# Patient Record
Sex: FEMALE | Employment: UNEMPLOYED | URBAN - METROPOLITAN AREA
[De-identification: names, ages, dates, MRNs, and addresses within clinical notes are randomized per-mention and may not be internally consistent; named-entity substitution may affect disease eponyms.]

---

## 2017-01-02 ENCOUNTER — ANESTHESIA - HEALTHEAST (OUTPATIENT)
Dept: SURGERY | Facility: CLINIC | Age: 42
End: 2017-01-02

## 2017-01-04 ENCOUNTER — RECORDS - HEALTHEAST (OUTPATIENT)
Dept: ADMINISTRATIVE | Facility: OTHER | Age: 42
End: 2017-01-04

## 2017-01-05 ENCOUNTER — SURGERY - HEALTHEAST (OUTPATIENT)
Dept: SURGERY | Facility: CLINIC | Age: 42
End: 2017-01-05

## 2017-01-05 ASSESSMENT — MIFFLIN-ST. JEOR: SCORE: 1294.99

## 2017-01-06 ENCOUNTER — HOME CARE/HOSPICE - HEALTHEAST (OUTPATIENT)
Dept: HOME HEALTH SERVICES | Facility: HOME HEALTH | Age: 42
End: 2017-01-06

## 2017-01-10 ENCOUNTER — HOME CARE/HOSPICE - HEALTHEAST (OUTPATIENT)
Dept: HOME HEALTH SERVICES | Facility: HOME HEALTH | Age: 42
End: 2017-01-10

## 2017-06-19 ENCOUNTER — OFFICE VISIT - HEALTHEAST (OUTPATIENT)
Dept: FAMILY MEDICINE | Facility: CLINIC | Age: 42
End: 2017-06-19

## 2017-06-19 DIAGNOSIS — M54.9 BACKACHE: ICD-10-CM

## 2017-06-19 DIAGNOSIS — K59.00 CONSTIPATION: ICD-10-CM

## 2017-06-19 ASSESSMENT — MIFFLIN-ST. JEOR: SCORE: 1218.06

## 2017-06-20 ENCOUNTER — COMMUNICATION - HEALTHEAST (OUTPATIENT)
Dept: ADMINISTRATIVE | Facility: CLINIC | Age: 42
End: 2017-06-20

## 2017-09-14 ENCOUNTER — OFFICE VISIT - HEALTHEAST (OUTPATIENT)
Dept: FAMILY MEDICINE | Facility: CLINIC | Age: 42
End: 2017-09-14

## 2017-09-14 DIAGNOSIS — H66.90 OTITIS MEDIA: ICD-10-CM

## 2017-09-14 ASSESSMENT — MIFFLIN-ST. JEOR: SCORE: 1188.4

## 2017-11-14 ENCOUNTER — OFFICE VISIT - HEALTHEAST (OUTPATIENT)
Dept: FAMILY MEDICINE | Facility: CLINIC | Age: 42
End: 2017-11-14

## 2017-11-14 DIAGNOSIS — R51.9 HEADACHE: ICD-10-CM

## 2017-11-14 DIAGNOSIS — K13.70 ORAL LESION: ICD-10-CM

## 2017-11-14 DIAGNOSIS — H93.13 CLICKING TINNITUS OF BOTH EARS: ICD-10-CM

## 2017-11-14 ASSESSMENT — MIFFLIN-ST. JEOR: SCORE: 1118.09

## 2017-11-16 LAB
LAB AP CHARGES (HE HISTORICAL CONVERSION): NORMAL
PATH REPORT.COMMENTS IMP SPEC: NORMAL
PATH REPORT.FINAL DX SPEC: NORMAL
PATH REPORT.GROSS SPEC: NORMAL
PATH REPORT.MICROSCOPIC SPEC OTHER STN: NORMAL
PATH REPORT.RELEVANT HX SPEC: NORMAL
RESULT FLAG (HE HISTORICAL CONVERSION): NORMAL

## 2017-11-17 ENCOUNTER — COMMUNICATION - HEALTHEAST (OUTPATIENT)
Dept: FAMILY MEDICINE | Facility: CLINIC | Age: 42
End: 2017-11-17

## 2018-06-01 ENCOUNTER — OFFICE VISIT - HEALTHEAST (OUTPATIENT)
Dept: FAMILY MEDICINE | Facility: CLINIC | Age: 43
End: 2018-06-01

## 2018-06-01 DIAGNOSIS — R20.2 NUMBNESS AND TINGLING IN BOTH HANDS: ICD-10-CM

## 2018-06-01 DIAGNOSIS — R51.9 HEADACHE: ICD-10-CM

## 2018-06-01 DIAGNOSIS — R20.0 NUMBNESS AND TINGLING IN BOTH HANDS: ICD-10-CM

## 2018-06-01 LAB
ANION GAP SERPL CALCULATED.3IONS-SCNC: 10 MMOL/L (ref 5–18)
BASOPHILS # BLD AUTO: 0 THOU/UL (ref 0–0.2)
BASOPHILS NFR BLD AUTO: 1 % (ref 0–2)
BUN SERPL-MCNC: 15 MG/DL (ref 8–22)
CALCIUM SERPL-MCNC: 9.5 MG/DL (ref 8.5–10.5)
CHLORIDE BLD-SCNC: 105 MMOL/L (ref 98–107)
CO2 SERPL-SCNC: 25 MMOL/L (ref 22–31)
CREAT SERPL-MCNC: 0.58 MG/DL (ref 0.6–1.1)
EOSINOPHIL # BLD AUTO: 0.1 THOU/UL (ref 0–0.4)
EOSINOPHIL NFR BLD AUTO: 2 % (ref 0–6)
ERYTHROCYTE [DISTWIDTH] IN BLOOD BY AUTOMATED COUNT: 15.4 % (ref 11–14.5)
GFR SERPL CREATININE-BSD FRML MDRD: >60 ML/MIN/1.73M2
GLUCOSE BLD-MCNC: 78 MG/DL (ref 70–125)
HCT VFR BLD AUTO: 29.7 % (ref 35–47)
HGB BLD-MCNC: 9 G/DL (ref 12–16)
LYMPHOCYTES # BLD AUTO: 1.8 THOU/UL (ref 0.8–4.4)
LYMPHOCYTES NFR BLD AUTO: 30 % (ref 20–40)
MCH RBC QN AUTO: 21.8 PG (ref 27–34)
MCHC RBC AUTO-ENTMCNC: 30.5 G/DL (ref 32–36)
MCV RBC AUTO: 71 FL (ref 80–100)
MONOCYTES # BLD AUTO: 0.4 THOU/UL (ref 0–0.9)
MONOCYTES NFR BLD AUTO: 8 % (ref 2–10)
NEUTROPHILS # BLD AUTO: 3.4 THOU/UL (ref 2–7.7)
NEUTROPHILS NFR BLD AUTO: 60 % (ref 50–70)
PLATELET # BLD AUTO: 176 THOU/UL (ref 140–440)
PMV BLD AUTO: 9.2 FL (ref 7–10)
POTASSIUM BLD-SCNC: 4.1 MMOL/L (ref 3.5–5)
RBC # BLD AUTO: 4.15 MILL/UL (ref 3.8–5.4)
SODIUM SERPL-SCNC: 140 MMOL/L (ref 136–145)
TSH SERPL DL<=0.005 MIU/L-ACNC: 0.68 UIU/ML (ref 0.3–5)
VIT B12 SERPL-MCNC: 820 PG/ML (ref 213–816)
WBC: 5.8 THOU/UL (ref 4–11)

## 2018-06-01 ASSESSMENT — MIFFLIN-ST. JEOR: SCORE: 1072.73

## 2018-06-05 ENCOUNTER — COMMUNICATION - HEALTHEAST (OUTPATIENT)
Dept: FAMILY MEDICINE | Facility: CLINIC | Age: 43
End: 2018-06-05

## 2018-06-06 ENCOUNTER — RECORDS - HEALTHEAST (OUTPATIENT)
Dept: ADMINISTRATIVE | Facility: OTHER | Age: 43
End: 2018-06-06

## 2018-06-06 ENCOUNTER — TRANSFERRED RECORDS (OUTPATIENT)
Dept: HEALTH INFORMATION MANAGEMENT | Facility: CLINIC | Age: 43
End: 2018-06-06

## 2018-07-11 NOTE — TELEPHONE ENCOUNTER
FUTURE VISIT INFORMATION      FUTURE VISIT INFORMATION:    Date: 07/12/2018    Time:     Location:   REFERRAL INFORMATION:    Referring provider:  Leti Parsons    Referring providers clinic:      Reason for visit/diagnosis          RECORDS STATUS      Internal Records Epic, Care Everywhere and PACS.

## 2018-07-12 ENCOUNTER — PRE VISIT (OUTPATIENT)
Dept: NEUROLOGY | Facility: CLINIC | Age: 43
End: 2018-07-12

## 2018-07-27 NOTE — TELEPHONE ENCOUNTER
FUTURE VISIT INFORMATION      FUTURE VISIT INFORMATION:    Date: 08/01/2018 Rescheduled from 07/12/2018    Time: 2:00 pm     Location: [facility]  REFERRAL INFORMATION:    Referring provider: Leti Henry     Referring providers clinic:      Reason for visit/diagnosis        NOTES (FOR ALL VISITS) STATUS DETAILS   OFFICE NOTE from referring provider N/A    OFFICE NOTE from other specialist N/A    DISCHARGE SUMMARY from hospital Care Everywhere  01/05/2017 - 01/08/2017   DISCHARGE REPORT from the ER N/A    OPERATIVE REPORT N/A    MEDICATION LIST Internal    IMAGING  (FOR ALL VISITS)     EMG Care Everywhere 06/06/2018   EEG N/A    ECT N/A    MRI (HEAD, NECK, SPINE) N/A    CT (HEAD, NECK, SPINE) N/A

## 2018-08-01 ENCOUNTER — OFFICE VISIT (OUTPATIENT)
Dept: NEUROLOGY | Facility: CLINIC | Age: 43
End: 2018-08-01
Payer: COMMERCIAL

## 2018-08-01 ENCOUNTER — PRE VISIT (OUTPATIENT)
Dept: NEUROLOGY | Facility: CLINIC | Age: 43
End: 2018-08-01

## 2018-08-01 ENCOUNTER — RECORDS - HEALTHEAST (OUTPATIENT)
Dept: ADMINISTRATIVE | Facility: OTHER | Age: 43
End: 2018-08-01

## 2018-08-01 VITALS
DIASTOLIC BLOOD PRESSURE: 54 MMHG | WEIGHT: 112.4 LBS | HEART RATE: 70 BPM | BODY MASS INDEX: 22.66 KG/M2 | SYSTOLIC BLOOD PRESSURE: 92 MMHG | HEIGHT: 59 IN | OXYGEN SATURATION: 97 % | RESPIRATION RATE: 20 BRPM

## 2018-08-01 DIAGNOSIS — G56.03 BILATERAL CARPAL TUNNEL SYNDROME: Primary | ICD-10-CM

## 2018-08-01 DIAGNOSIS — H90.5 SENSORINEURAL HEARING LOSS, UNILATERAL: ICD-10-CM

## 2018-08-01 ASSESSMENT — PAIN SCALES - GENERAL: PAINLEVEL: NO PAIN (0)

## 2018-08-01 NOTE — MR AVS SNAPSHOT
After Visit Summary   8/1/2018    Nataliya Lei    MRN: [id number]           Patient Information     Date Of Birth          1975        Visit Information        Provider Department      8/1/2018 2:15 PM Everardo Suarez MD [facility]        Today's Diagnoses     Bilateral carpal tunnel syndrome    -  1    Sensorineural hearing loss, unilateral           Follow-ups after your visit        Additional Services     ORTHOPEDICS ADULT REFERRAL       Your provider has referred you to: PREFERRED PROVIDERS:    Please be aware that coverage of these services is subject to the terms and limitations of your health insurance plan.  Call member services at your health plan with any benefit or coverage questions.      Please bring the following to your appointment:    >>   Any x-rays, CTs or MRIs which have been performed.  Contact the facility where they were done to arrange for  prior to your scheduled appointment.    >>   List of current medications   >>   This referral request   >>   Any documents/labs given to you for this referral            OTOLARYNGOLOGY REFERRAL       Your provider has referred you to:     Consideration of Meniere's disease    Please be aware that coverage of these services is subject to the terms and limitations of your health insurance plan.  Call member services at your health plan with any benefit or coverage questions.      Please bring the following with you to your appointment:    (1) Any X-Rays, CTs or MRIs which have been performed.  Contact the facility where they were done to arrange for  prior to your scheduled appointment.   (2) List of current medications  (3) This referral request   (4) Any documents/labs given to you for this referral                  Follow-up notes from your care team     Return in about 6 months (around 2/1/2019).      Your next 10 appointments already scheduled     Aug 06, 2018  1:00 PM CDT   (Arrive by 12:45 PM)   New Patient  "Visit with Jaquan Pires MD   [facility] ([facility])    [address]  5th Floor  [facility] [id number] [phone]            2019  3:30 PM CST   (Arrive by 3:15 PM)   Return Visit with Everardo Suarez MD   [facility] ([facility])    [address]  3rd Floor  [place] [id number] [phone]              Who to contact     Please call your clinic at [phone] to:    Ask questions about your health    Make or cancel appointments    Discuss your medicines    Learn about your test results    Speak to your doctor            Additional Information About Your Visit        MyChart Information     [employer] is an electronic gateway that provides easy, online access to your medical records. With [employer], you can request a clinic appointment, read your test results, renew a prescription or communicate with your care team.     To sign up for [employer] visit the website at [email or web address]   You will be asked to enter the access code listed below, as well as some personal information. Please follow the directions to create your username and password.     Your access code is: [email or web address]  Expires: 2018  6:31 AM     Your access code will  in 90 days. If you need help or a new code, please contact your [facility] or call [phone] for assistance.        Care EveryWhere ID     This is your Care EveryWhere ID. This could be used by other organizations to access your [facility] medical records  [id number]        Your Vitals Were     Pulse Respirations Height Pulse Oximetry BMI (Body Mass Index)       70 20 1.499 m (4' 11\") 97% 22.7 kg/m2        Blood Pressure from Last 3 Encounters:   18 92/54    Weight from Last 3 Encounters:   18 51 kg (112 lb 6.4 oz)              We Performed the Following     ORTHOPEDICS ADULT REFERRAL     OTOLARYNGOLOGY " REFERRAL        Primary Care Provider Office Phone # Fax #    Leti Parsons [phone]       [address]  [facility] [address]        Equal Access to Services     DEMETRIO RICHARDSON : Hadii gomez ku hadgko Soomaali, waaxda luqadaha, qaybta kaalmada adeegyada, waxave marleyn alexy maguire laNicholejaylen parrish. So [facility] [phone].    ATENCIÓN: Si habla español, tiene a gilbert disposición servicios gratuitos de asistencia lingüística. Llame al [phone].    We comply with applicable federal civil rights laws and [place] laws. We do not discriminate on the basis of race, color, national origin, age, disability, sex, sexual orientation, or gender identity.            Thank you!     Thank you for choosing [facility]UROLOGY  for your care. Our goal is always to provide you with excellent care. Hearing back from our patients is one way we can continue to improve our services. Please take a few minutes to complete the written survey that you may receive in the mail after your visit with us. Thank you!             Your Updated Medication List - Protect others around you: Learn how to safely use, store and throw away your medicines at www.disposemymeds.org.      Notice  As of 8/1/2018  3:21 PM    You have not been prescribed any medications.

## 2018-08-01 NOTE — LETTER
"8/1/2018       RE: Nataliya Lei  [address] [place] [address]     Dear Colleague,    Thank you for referring your patient, Nataliya Lei, to the [facility] NE[facility]OLOGY at [facility]. Please see a copy of my visit note below.    Office note dictated.    Service Date: 08/01/2018      DEPARTMENT OF NEUROLOGY      NEUROLOGY CONSULTATION      CHIEF COMPLAINT:  Numbness, tingling and ear fullness.      HISTORY OF PRESENT ILLNESS:  Nataliya Lei is a 43-year-old woman with past medical history significant for a diagnosis of carpal tunnel syndrome who is presenting with several neurological complaints.  First, regarding her carpal tunnel syndrome.  She began to have symptoms of numbness and tingling and \"nerve zinging\" pain that started approximately 5 years ago.  She recalls she was pregnant with her child when she started to have these symptoms.  Her primary care physician did give her a brace to be worn at night at this time.  Unfortunately, she only wore these braces intermittently throughout the next several years, then in approximately 2015, her symptoms began to worsen again.  She did follow up with her primary care physician and Neurology.  These notes are currently unavailable to be viewed.  She tells me she had an EMG and was again diagnosed with carpal tunnel syndrome.  She was managed conservatively and recommended to wear her braces over the next several years, she did not end up wearing her braces.  Then, approximately 5-6 months ago, her symptoms worsened significantly.  This prompted her to be evaluated by her primary care physician.  Her primary care physician ordered an EMG which was completed on 06/06/2018.  This EMG report is unavailable to be viewed.  Per patient report, this did show bilateral carpal tunnel syndrome, with right worse than left.  She is currently here to follow up on her EMG results and to discuss the next steps involved in her care.  She does endorse " "mild weakness, primarily in her distal hand and fingers.  This is notable when she is carrying a coffee cup or driving and holding onto the steering wheel for an extended period of time.  It does seem to bother her consistently throughout the day.  She has not noticed any changes in her symptoms due to therapies. She does not note any changes from medications.  Nothing seems to make it better or worse. It is now affecting her daily life significantly.      She does endorse other symptoms primarily of hearing loss and right ear fullness that started in 10/2017.  She notes at the onset, the primary symptom was hearing loss.  She describes this hearing loss as a \"boom\" sound in her right ear.  Following this sound, she describes a fullness that makes sounds foggy and difficult to hear as if it were through a substance other than air.  This started out as an intermittent process and then continuously progressed and became more frequent and more significant in nature.  Associated with this feeling of right ear fullness is tinnitus.  She does describe episodic tinnitus that does not seem to be related to position changes or activity.  She does seem to struggle with both of these symptoms.  She denies any overt vertiginous symptoms, though she does note that she was getting up from a chair several months ago and did feel as if the room were spinning and as if she was going to pass out.  She describes more of a lightheadedness feeling during this episode.  This has not happened before and she does not currently endorse any other room-spinning sensations.      REVIEW OF SYSTEMS:  A 12-point review of systems is negative except as per HPI.      ALLERGIES:  No known drug allergies.      MEDICATIONS:  No current outpatient prescriptions.      PAST MEDICAL HISTORY:  No pertinent past medical history.      PAST SURGICAL HISTORY:  No pertinent surgical history.      SOCIAL HISTORY:  She is currently single.  She denies any " smoking.  She denies alcohol.  She denies any other drug use.      FAMILY HISTORY:  She denies any pertinent family history.      PHYSICAL EXAMINATION:   VITALS:  Blood pressure is 92/54, pulse is 70, respirations are at 20.  She is 51 kg.   GENERAL:  The patient is sitting in a chair in no acute distress.   HEENT:  Atraumatic, normocephalic.   CARDIAC:  Regular rate and rhythm.   PULMONARY:  Clear to auscultation bilaterally.   ABDOMEN:  Nontender, nondistended.   EXTREMITIES:  No edema noted bilaterally.   SKIN:  Warm and dry.   NEUROLOGIC:   MENTAL STATUS:  Patient is alert and oriented to person, place and time.  She has intact comprehension and naming.  No dysarthria.   CRANIAL NERVES:  Pupils are equal, round and reactive to light.  Extraocular motions are intact.  Visual fields are full.  Her facial sensation is intact.  She does not have any facial asymmetry.  Tongue is midline.     MOTOR:  No abnormal tone, atrophy or movements or fasciculations.  She has 5/5 strength in bilateral deltoids, biceps, triceps.  She does have very mild decreased strength in her opponens pollicis on the right and finger flexors on the right as well as first dorsal interossei on the right.  She does have full strength in wrist flexion and wrist extension on the right upper extremity.  Her distal left upper extremity reveals mild first dorsal interossei weakness but is otherwise 5/5 strength throughout.   REFLEXES:  She has 2+ and symmetric reflexes at the brachioradialis, biceps and patella bilaterally.   SENSORY:  Intact light touch, pinprick and vibration in proximal bilateral upper extremities.  She does have decreased sensation to pinprick and vibration primarily in digits 1 through 3 from the distal fingertips to the palm bilaterally.  She does have decreased sensation to a lesser extent in digits 4 and 5 from the distal fingertips to the palm bilaterally.  She has intact light touch, pinprick and vibration in bilateral  lower extremities.   COORDINATION:  Intact finger-nose-finger.   GAIT:  Normal with stride length, turn and arm swing.      ASSESSMENT AND PLAN:  Ms. Nataliya Lei is a very pleasant 43-year-old lady with a diagnosis of carpal tunnel, who is presenting to the clinic with multiple different neurologic complaints.  First, she does endorse numbness, tingling and weakness in her bilateral upper extremities.  She does state that the numbness goes proximal to her shoulder blades.  She did have an EMG, the report is not currently available to me, but per patient report revealed a bilateral carpal tunnel syndrome, with right worse than left.  I do believe that her upper extremity numbness, tingling and pain is all related to her carpal tunnel syndrome.  Due to the weakness reported and mild weakness on examination, I will refer her to Orthopedic Surgery for further evaluation of a carpal tunnel procedure.      Regarding her symptoms in her right ear.  She describes tinnitus, sensorineural hearing loss.  She does deny significant vertiginous symptoms, although she does endorse more of a lightheadedness feeling over the last several months.  My concern is that this could be related to the inner ear rather than a neurologic disease given her description of her symptoms.  I will refer her to ENT for further diagnostic testing.  If no significant findings are appreciated by ENT, we will be happy to see Ms. Lei back in our clinic to reevaluate her right ear symptoms as she may require an MRI in the future.      PLAN:     1.  Referral to Orthopedic Surgery regarding carpal tunnel syndrome.   2.  Referral to ENT regarding sensorineural hearing loss and tinnitus.   3.  Return to clinic in 6 months.      The patient was seen and discussed with the attending physician, Dr. Vini Santos.     D: 2018   T: 2018   MT: ODETTE      Name:     NATALIYA LEI   MRN:      [id number]        Account:      [id number]   :       1975           Service Date: 08/01/2018      Document: [id number]       I saw and examined this patient, and have read and edited the resident's note.  I agree with the findings, assessment, and plan.    Vini Santos  Staff Neurologist   08/12/18       Again, thank you for allowing me to participate in the care of your patient.      Sincerely,    Everardo Suarez MD

## 2018-08-03 NOTE — PROGRESS NOTES
"Service Date: 08/01/2018      DEPARTMENT OF NEUROLOGY      NEUROLOGY CONSULTATION      CHIEF COMPLAINT:  Numbness, tingling and ear fullness.      HISTORY OF PRESENT ILLNESS:  Nataliya Lei is a 43-year-old woman with past medical history significant for a diagnosis of carpal tunnel syndrome who is presenting with several neurological complaints.  First, regarding her carpal tunnel syndrome.  She began to have symptoms of numbness and tingling and \"nerve zinging\" pain that started approximately 5 years ago.  She recalls she was pregnant with her child when she started to have these symptoms.  Her primary care physician did give her a brace to be worn at night at this time.  Unfortunately, she only wore these braces intermittently throughout the next several years, then in approximately 2015, her symptoms began to worsen again.  She did follow up with her primary care physician and Neurology.  These notes are currently unavailable to be viewed.  She tells me she had an EMG and was again diagnosed with carpal tunnel syndrome.  She was managed conservatively and recommended to wear her braces over the next several years, she did not end up wearing her braces.  Then, approximately 5-6 months ago, her symptoms worsened significantly.  This prompted her to be evaluated by her primary care physician.  Her primary care physician ordered an EMG which was completed on 06/06/2018.  This EMG report is unavailable to be viewed.  Per patient report, this did show bilateral carpal tunnel syndrome, with right worse than left.  She is currently here to follow up on her EMG results and to discuss the next steps involved in her care.  She does endorse mild weakness, primarily in her distal hand and fingers.  This is notable when she is carrying a coffee cup or driving and holding onto the steering wheel for an extended period of time.  It does seem to bother her consistently throughout the day.  She has not noticed any changes in her " "symptoms due to therapies. She does not note any changes from medications.  Nothing seems to make it better or worse. It is now affecting her daily life significantly.      She does endorse other symptoms primarily of hearing loss and right ear fullness that started in 10/2017.  She notes at the onset, the primary symptom was hearing loss.  She describes this hearing loss as a \"boom\" sound in her right ear.  Following this sound, she describes a fullness that makes sounds foggy and difficult to hear as if it were through a substance other than air.  This started out as an intermittent process and then continuously progressed and became more frequent and more significant in nature.  Associated with this feeling of right ear fullness is tinnitus.  She does describe episodic tinnitus that does not seem to be related to position changes or activity.  She does seem to struggle with both of these symptoms.  She denies any overt vertiginous symptoms, though she does note that she was getting up from a chair several months ago and did feel as if the room were spinning and as if she was going to pass out.  She describes more of a lightheadedness feeling during this episode.  This has not happened before and she does not currently endorse any other room-spinning sensations.      REVIEW OF SYSTEMS:  A 12-point review of systems is negative except as per HPI.      ALLERGIES:  No known drug allergies.      MEDICATIONS:  No current outpatient prescriptions.      PAST MEDICAL HISTORY:  No pertinent past medical history.      PAST SURGICAL HISTORY:  No pertinent surgical history.      SOCIAL HISTORY:  She is currently single.  She denies any smoking.  She denies alcohol.  She denies any other drug use.      FAMILY HISTORY:  She denies any pertinent family history.      PHYSICAL EXAMINATION:   VITALS:  Blood pressure is 92/54, pulse is 70, respirations are at 20.  She is 51 kg.   GENERAL:  The patient is sitting in a chair in no " acute distress.   HEENT:  Atraumatic, normocephalic.   CARDIAC:  Regular rate and rhythm.   PULMONARY:  Clear to auscultation bilaterally.   ABDOMEN:  Nontender, nondistended.   EXTREMITIES:  No edema noted bilaterally.   SKIN:  Warm and dry.   NEUROLOGIC:   MENTAL STATUS:  Patient is alert and oriented to person, place and time.  She has intact comprehension and naming.  No dysarthria.   CRANIAL NERVES:  Pupils are equal, round and reactive to light.  Extraocular motions are intact.  Visual fields are full.  Her facial sensation is intact.  She does not have any facial asymmetry.  Tongue is midline.     MOTOR:  No abnormal tone, atrophy or movements or fasciculations.  She has 5/5 strength in bilateral deltoids, biceps, triceps.  She does have very mild decreased strength in her opponens pollicis on the right and finger flexors on the right as well as first dorsal interossei on the right.  She does have full strength in wrist flexion and wrist extension on the right upper extremity.  Her distal left upper extremity reveals mild first dorsal interossei weakness but is otherwise 5/5 strength throughout.   REFLEXES:  She has 2+ and symmetric reflexes at the brachioradialis, biceps and patella bilaterally.   SENSORY:  Intact light touch, pinprick and vibration in proximal bilateral upper extremities.  She does have decreased sensation to pinprick and vibration primarily in digits 1 through 3 from the distal fingertips to the palm bilaterally.  She does have decreased sensation to a lesser extent in digits 4 and 5 from the distal fingertips to the palm bilaterally.  She has intact light touch, pinprick and vibration in bilateral lower extremities.   COORDINATION:  Intact finger-nose-finger.   GAIT:  Normal with stride length, turn and arm swing.      ASSESSMENT AND PLAN:  Ms. Nataliya Lei is a very pleasant 43-year-old lady with a diagnosis of carpal tunnel, who is presenting to the clinic with multiple different  neurologic complaints.  First, she does endorse numbness, tingling and weakness in her bilateral upper extremities.  She does state that the numbness goes proximal to her shoulder blades.  She did have an EMG, the report is not currently available to me, but per patient report revealed a bilateral carpal tunnel syndrome, with right worse than left.  I do believe that her upper extremity numbness, tingling and pain is all related to her carpal tunnel syndrome.  Due to the weakness reported and mild weakness on examination, I will refer her to Orthopedic Surgery for further evaluation of a carpal tunnel procedure.      Regarding her symptoms in her right ear.  She describes tinnitus, sensorineural hearing loss.  She does deny significant vertiginous symptoms, although she does endorse more of a lightheadedness feeling over the last several months.  My concern is that this could be related to the inner ear rather than a neurologic disease given her description of her symptoms.  I will refer her to ENT for further diagnostic testing.  If no significant findings are appreciated by ENT, we will be happy to see Ms. Lei back in our clinic to reevaluate her right ear symptoms as she may require an MRI in the future.      PLAN:     1.  Referral to Orthopedic Surgery regarding carpal tunnel syndrome.   2.  Referral to ENT regarding sensorineural hearing loss and tinnitus.   3.  Return to clinic in 6 months.      The patient was seen and discussed with the attending physician, Dr. Vini Santos.      Everardo Suarez MD  Neurology Resident, PGY-3       D: 2018   T: 2018   MT: GB      Name:     PRATIBHA LEI   MRN:      [id number]        Account:      [id number]   :      1975           Service Date: 2018      Document: [id number]       I saw and examined this patient, and have read and edited the resident's note.  I agree with the findings, assessment, and plan.    Vini Santos  Staff  Neurologist   08/12/18

## 2018-08-06 NOTE — TELEPHONE ENCOUNTER
FUTURE VISIT INFORMATION      FUTURE VISIT INFORMATION:    Date: 8/07    Time: 1:30    Location:   REFERRAL INFORMATION:    Referring provider:  Everardo Suarez    Referring providers clinic:  [facility] NEUROLOGY    Reason for visit/diagnosis: Bilateral carpal tunnel syndrome    RECORDS REQUESTED FROM:       Clinic name Comments Records Status Imaging Status   [facility]  RECEIVED                                    RECORDS STATUS      RECORDS IN EPIC

## 2018-08-07 ENCOUNTER — OFFICE VISIT (OUTPATIENT)
Dept: ORTHOPEDICS | Facility: CLINIC | Age: 43
End: 2018-08-07
Payer: COMMERCIAL

## 2018-08-07 ENCOUNTER — RECORDS - HEALTHEAST (OUTPATIENT)
Dept: ADMINISTRATIVE | Facility: OTHER | Age: 43
End: 2018-08-07

## 2018-08-07 ENCOUNTER — PRE VISIT (OUTPATIENT)
Dept: ORTHOPEDICS | Facility: CLINIC | Age: 43
End: 2018-08-07

## 2018-08-07 VITALS — HEIGHT: 59 IN | WEIGHT: 112 LBS | BODY MASS INDEX: 22.58 KG/M2

## 2018-08-07 DIAGNOSIS — G56.03 CARPAL TUNNEL SYNDROME ON BOTH SIDES: Primary | ICD-10-CM

## 2018-08-07 NOTE — PROGRESS NOTES
" Subjective:   Nataliya Lei is a RHD 43 year old female who is here for bilateral wrist and hand pain, numbness, and tingling x 5 years. Is a stay at home mom. She has had at least 2 years of intermittent symptoms in her bilateral hands.  She has utilized wrist splints at night for about 18 months without significant relief.  She has had prior carpal tunnel corticosteroid injections which did not relieve her symptoms.  She is right hand dominant.  At this juncture, she would like to proceed with carpal tunnel release.  She does not have thyroid disease, diabetes and is not pregnant.  She had an EMG in 06/2018 and that is reviewed by myself and will be scanned into the chart.  It demonstrates mild to moderate right and left carpal tunnel syndrome.       Background:   Date of injury: none     Prior Evaluation: Prior Physician Evalutation, Injection    PAST MEDICAL, SOCIAL, SURGICAL AND FAMILY HISTORY: She  has no past medical history on file.  She  has no past surgical history on file.  Her family history is not on file.  She reports that she has never smoked. She has never used smokeless tobacco.    ALLERGIES: She is allergic to blood-group specific substance.    CURRENT MEDICATIONS: She currently has no medications in their medication list.   REVIEW OF SYSTEMS: 12 point review of systems is negative except as noted above.     Exam:   Ht 4' 11\" (1.499 m)  Wt 112 lb (50.8 kg)  BMI 22.62 kg/m2      CONSTITUTIONAL: healthy, alert and no distress  GAIT: normal  NEUROLOGIC: Non-focal  PSYCHIATRIC: affect normal/bright and mentation appears normal.    MUSCULOSKELETAL:   Bilateral wrists demonstrate no evidence of thenar atrophy.  She has full range of motion in her bilateral wrists.  She has negative Tinel's bilaterally.  She has positive Phalen's that approximately 15 seconds causing increased paresthesias in the third and fourth digit on the right hand and second and third digit on the left hand.          Assessment/Plan: "   (G56.03) Carpal tunnel syndrome on both sides  (primary encounter diagnosis)  Comment: Discussed at length. Discussed surgical approach, risks, benefits, alternatives. She would like to proceed with surgical consult.  Plan: ORTHO  REFERRAL

## 2018-08-07 NOTE — LETTER
"  8/7/2018      RE: Nataliya Lei  [address] [place] [address]        Subjective:   Nataliya Lei is a RHD 43 year old female who is here for bilateral wrist and hand pain, numbness, and tingling x 5 years. Is a stay at home mom. She has had at least 2 years of intermittent symptoms in her bilateral hands.  She has utilized wrist splints at night for about 18 months without significant relief.  She has had prior carpal tunnel corticosteroid injections which did not relieve her symptoms.  She is right hand dominant.  At this juncture, she would like to proceed with carpal tunnel release.  She does not have thyroid disease, diabetes and is not pregnant.  She had an EMG in 06/2018 and that is reviewed by myself and will be scanned into the chart.  It demonstrates mild to moderate right and left carpal tunnel syndrome.       Background:   Date of injury: none     Prior Evaluation: Prior Physician Evalutation, Injection    PAST MEDICAL, SOCIAL, SURGICAL AND FAMILY HISTORY: She  has no past medical history on file.  She  has no past surgical history on file.  Her family history is not on file.  She reports that she has never smoked. She has never used smokeless tobacco.    ALLERGIES: She is allergic to blood-group specific substance.    CURRENT MEDICATIONS: She currently has no medications in their medication list.   REVIEW OF SYSTEMS: 12 point review of systems is negative except as noted above.     Exam:   Ht 4' 11\" (1.499 m)  Wt 112 lb (50.8 kg)  BMI 22.62 kg/m2      CONSTITUTIONAL: healthy, alert and no distress  GAIT: normal  NEUROLOGIC: Non-focal  PSYCHIATRIC: affect normal/bright and mentation appears normal.    MUSCULOSKELETAL:   Bilateral wrists demonstrate no evidence of thenar atrophy.  She has full range of motion in her bilateral wrists.  She has negative Tinel's bilaterally.  She has positive Phalen's that approximately 15 seconds causing increased paresthesias in the third and fourth digit on the " right hand and second and third digit on the left hand.          Assessment/Plan:   (G56.03) Carpal tunnel syndrome on both sides  (primary encounter diagnosis)  Comment: Discussed at length. Discussed surgical approach, risks, benefits, alternatives. She would like to proceed with surgical consult.  Plan: ORTHO DEST[facility] REFERRAL             Jaquan Pires MD

## 2018-08-10 DIAGNOSIS — M79.641 BILATERAL HAND PAIN: Primary | ICD-10-CM

## 2018-08-10 DIAGNOSIS — M79.642 BILATERAL HAND PAIN: Primary | ICD-10-CM

## 2018-08-11 ENCOUNTER — PRE VISIT (OUTPATIENT)
Dept: ORTHOPEDICS | Facility: CLINIC | Age: 43
End: 2018-08-11

## 2018-08-11 NOTE — TELEPHONE ENCOUNTER
FUTURE VISIT INFORMATION      FUTURE VISIT INFORMATION:    Date: 8/13    Time: 8:30    [place] [facility] [place]  REFERRAL INFORMATION:    Referring provider:  Tere Pires    Referring providers clinic:  [facility]    Reason for visit/diagnosis  bilateral carpal tunnel    RECORDS REQUESTED FROM:       Clinic name Comments Records Status Imaging Status                                         RECORDS STATUS      All records internal

## 2018-08-13 ENCOUNTER — RADIANT APPOINTMENT (OUTPATIENT)
Dept: GENERAL RADIOLOGY | Facility: CLINIC | Age: 43
End: 2018-08-13
Attending: ORTHOPAEDIC SURGERY
Payer: COMMERCIAL

## 2018-08-13 ENCOUNTER — RECORDS - HEALTHEAST (OUTPATIENT)
Dept: ADMINISTRATIVE | Facility: OTHER | Age: 43
End: 2018-08-13

## 2018-08-13 ENCOUNTER — TELEPHONE (OUTPATIENT)
Dept: OTOLARYNGOLOGY | Facility: CLINIC | Age: 43
End: 2018-08-13

## 2018-08-13 ENCOUNTER — OFFICE VISIT (OUTPATIENT)
Dept: ORTHOPEDICS | Facility: CLINIC | Age: 43
End: 2018-08-13
Payer: COMMERCIAL

## 2018-08-13 VITALS — BODY MASS INDEX: 22.58 KG/M2 | HEIGHT: 59 IN | WEIGHT: 112 LBS

## 2018-08-13 DIAGNOSIS — M79.642 BILATERAL HAND PAIN: ICD-10-CM

## 2018-08-13 DIAGNOSIS — G56.01 CARPAL TUNNEL SYNDROME OF RIGHT WRIST: ICD-10-CM

## 2018-08-13 DIAGNOSIS — G56.21 CUBITAL TUNNEL SYNDROME ON RIGHT: Primary | ICD-10-CM

## 2018-08-13 DIAGNOSIS — M79.641 BILATERAL HAND PAIN: ICD-10-CM

## 2018-08-13 ASSESSMENT — ENCOUNTER SYMPTOMS
RECTAL PAIN: 0
TROUBLE SWALLOWING: 0
LEG PAIN: 0
JOINT SWELLING: 0
HYPERTENSION: 0
MUSCLE CRAMPS: 0
HOARSE VOICE: 0
SORE THROAT: 0
MYALGIAS: 0
SYNCOPE: 0
BLOATING: 1
NECK PAIN: 1
JAUNDICE: 0
TASTE DISTURBANCE: 0
PALPITATIONS: 0
STIFFNESS: 1
EXERCISE INTOLERANCE: 0
HYPOTENSION: 0
HEARTBURN: 0
ARTHRALGIAS: 1
LIGHT-HEADEDNESS: 1
SINUS CONGESTION: 0
BLOOD IN STOOL: 0
SINUS PAIN: 0
BOWEL INCONTINENCE: 0
VOMITING: 0
NECK MASS: 0
ABDOMINAL PAIN: 1
SLEEP DISTURBANCES DUE TO BREATHING: 0
MUSCLE WEAKNESS: 0
BACK PAIN: 1
DIARRHEA: 0
NAUSEA: 0
SMELL DISTURBANCE: 0
CONSTIPATION: 1

## 2018-08-13 NOTE — NURSING NOTE
"Reason For Visit:   Chief Complaint   Patient presents with     Right Hand - Cts     Left Hand - Cts       Primary MD: Leti Parsons      ?  No    Age: 43 year old    Date of surgery: NA  Type of surgery: NA.  Smoker: No  Request smoking cessation information: No      Ht 1.499 m (4' 11\")  Wt 50.8 kg (112 lb)  BMI 22.62 kg/m2      Pain Assessment  Patient Currently in Pain: Yes  0-10 Pain Scale: 6  Primary Pain Location: Hand  Pain Orientation: Left, Right  Pain Descriptors: Tingling, Sharp, Numbness  Aggravating Factors: Exercise, Movement    Hand Dominance Evaluation  Hand Dominance: Right      force  R hand pincher force: 4.082 kg (9 lb)  R hand  level 2 force: 17.2 kg (38 lb)  L hand pincher force: 19.1 kg (42 lb)  L hand  level  2 force: 3.856 kg (8 lb 8 oz)    QuickDASH Assessment  QuickDASH Main 8/13/2018   1.Open a tight or new jar. Mild difficulty   2. Do heavy household chores (e.g., wash walls, floors) Unable to answer   3. Carry a shopping bag or briefcase. No difficulty   4. Wash your back. No difficulty   5. Use a knife to cut food. Moderate difficulty   6. Recreational activities in which you take some force or impact through your arm, shoulder or hand (e.g., golf, hammering, tennis, etc.). Moderate difficulty   7. During the past week, to what extent has your arm, shoulder or hand problem interfered with your normal social activities with family, friends, neighbours or groups? Not at all   8. During the past week, were you limited in your work or other regular daily activities as a result of your arm, shoulder or hand problem? Moderately limited   9. Arm, shoulder or hand pain. Moderate   10.Tingling (pins and needles) in your arm,shoulder or hand. Severe   11. During the past week, how much difficulty have you had sleeping because of the pain in your arm, shoulder or hand? ([place] number) Moderate difficulty   Quickdash Ability Score 29.54          Allergies   Allergen " Reactions     Blood-Group Specific Substance      Anti Lesa antibody present       Landry Damon, ATC

## 2018-08-13 NOTE — MR AVS SNAPSHOT
After Visit Summary   8/13/2018    Nataliya Lei    MRN: [id number]           Patient Information     Date Of Birth          1975        Visit Information        Provider Department      8/13/2018 8:30 AM Marcos Saucedo MD [facility]        Today's Diagnoses     Cubital tunnel syndrome on right    -  1    Carpal tunnel syndrome of right wrist           Follow-ups after your visit        Your next 10 appointments already scheduled     Aug 24, 2018  2:30 PM CDT   Walk In From ENT with Donna Powell   [facility] ([facility])    [address] [place] [id number] [phone]            Aug 24, 2018  3:30 PM CDT   (Arrive by 3:15 PM)   New Patient Visit with Jorge Be MD   [facility] ([facility])    [address] [place] [id number] [phone]            Feb 06, 2019  3:30 PM CST   (Arrive by 3:15 PM)   Return Visit with Everardo Suarez MD   [facility] ([facility])    [address] [place] [id number] [phone]              Who to contact     Please call your clinic at [phone] to:    Ask questions about your health    Make or cancel appointments    Discuss your medicines    Learn about your test results    Speak to your doctor            Additional Information About Your Visit        MyChart Information     [email or web address] is an electronic gateway that provides easy, online access to your medical records. With MyColorScreen, you can request a clinic appointment, read your test results, renew a prescription or communicate with your care team.     To sign up for [email or web address] visit the website at [email or web address]   You will be asked to enter the access code listed below, as well as some personal information. Please follow the directions to create your username and  "password.     Your access code is: [email or web address]  Expires: 2018  6:31 AM     Your access code will  in 90 days. If you need help or a new code, please contact your [facility] or call [phone] for assistance.        Care EveryWhere ID     This is your Care EveryWhere ID. This could be used by other organizations to access your [facility] medical records  [id number]        Your Vitals Were     Height BMI (Body Mass Index)                1.499 m (4' 11\") 22.62 kg/m2           Blood Pressure from Last 3 Encounters:   18 92/54    Weight from Last 3 Encounters:   18 50.8 kg (112 lb)   18 50.8 kg (112 lb)   18 51 kg (112 lb 6.4 oz)              We Performed the Following     Ca-Operative Worksheet (Hand)        Primary Care Provider Office Phone # Fax #    Leti UNDERWOOD Jeffreyjimbo [phone]       [address] [address]        Equal Access to Services     [facility]: Hadii aad ku hadasho Soomaali, waaxda luqadaha, qaybta kaalmada adeegyada, tani talamantes . So [facility] [phone].    ATENCIÓN: Si habla español, tiene a gilbert disposición servicios gratuitos de asistencia lingüística. [facility] [phone].    We comply with applicable federal civil rights laws and [place] laws. We do not discriminate on the basis of race, color, national origin, age, disability, sex, sexual orientation, or gender identity.            Thank you!     Thank you for choosing [facility]  for your care. Our goal is always to provide you with excellent care. Hearing back from our patients is one way we can continue to improve our services. Please take a few minutes to complete the written survey that you may receive in the mail after your visit with us. Thank you!             Your Updated Medication List - Protect others around you: Learn how to safely use, store and throw away your medicines at " www.disposemymeds.org.      Notice  As of 8/13/2018 11:34 AM    You have not been prescribed any medications.

## 2018-08-13 NOTE — LETTER
"2018       RE: Nataliya Lei  [address] [place] [address]     Dear Colleague,    Thank you for referring your patient, Nataliya Lei, [facility]. Please see a copy of my visit note below.    [facility]  Orthopedics  Marcos Saucedo MD  2018     Name: Nataliya Lei  MRN: [id number]  Age: 43 year old  : 1975  Referring provider: Jaquan Pires     Chief Complaint:  Bilateral carpal tunnel syndrome    History of Present Illness:   Nataliya Lei is a 43 year old female who presents today for evaluation of bilateral hand numbness and tingling. Numbness and tingling effects the thumb, index, long (middle) and ring. Symptoms first began \"a long time ago\". There was not an inciting event. Symptoms do wake the patient up at night. They improve when she shakes out her hands. The following treatments been tried: corticosteroid injection into the carpal tunnel and bracing, which have only alleviated symptoms briefly. Also expresses concern for \"shooting\" sensation from the elbow medial elbow and occasional associated altered sensation in the small fingers. Right side symptoms are much worse than left.     Review of Systems:   A 14-point review of systems was obtained and is negative except for as noted in the HPI.     Medications:   The patient denies any regular medication use.     Allergies:  Allergies   Allergen Reactions     Blood-Group Specific Substance      Anti Lesa antibody present       Past Medical History:  The patient does not have any pertinent past medical history.     Past Surgical History:  The patient does not have any pertinent past surgical history.      Family History:  Negative for bleeding or clotting disorders or adverse reactions to anesthesia.    Physical Examination:  Height 1.499 m (4' 11\"), weight 50.8 kg (112 lb), unknown if currently breastfeeding.   Bilateral upper extremities:  Skin clean, dry and " intact. No thenar or hypothenar atrophy or deformity. Negative Tinel's over the carpal tunnel on the right, positive on the left. Positive Tinel's at the cubital tunnel bilaterally. Positive Phalen's bilaterally. Positive carpal compression test bilaterally. Static two point discrimination is 5 mm to the right ulnar, 6 mm to the right median, 4 mm to the left ulnar and 3 mm to the left median. Sensation intact to light touch radial nerve disruption bilaterally and in ulnar nerve distribution on left, altered in median nerve distribution bilaterally and in ulnar freda distributionon right is altered. No subluxation of the ulnar nerve at the elbow bilaterally.     Electrodiagnostic Studies:   An electrodiagnostic study of the bilateral extremities was available for review today.  This was completed on June 06, 2018 and demonstrated mild to moderate right greater than left bilateral carpal tunnel syndrome, progressed since 2012.     Xrays: Three views were obtained of the bilateral hands and demonstrate no bony abnormalities.     Assessment:   43 year old female with bilateral carpal tunnel syndrome and cubital tunnel syndrome.    Plan:   Discussed with the patient, in detail, treatment options available to her. Symptoms have persisted despite bracing and injections. Options include continued bracing versus surgical intervention in the form of open carpal tunnel release or open carpal tunnel release and cubital tunnel release, possible ulnar nerve transposition. I believe that the majority of her symptoms are from carpal tunnel syndrome but there appears to be some element of cubital tunnel as well with a positive tinels and shooting pains from the cubital tunnel area into the small finger. The patient prefers to address both problems, the right side first. Risks an benefits of the surgical intervention was discussed in detail. Specific risks discussed include infection, bleeding, pain,scarring, damage to nerves, blood  vessels, or other nearby structures, stiffness, complex regional pain syndrome, worsening or failure of symptoms to improve, need for further surgery, and risks of anesthesia including heart attack, stroke and death. Patient voiced understanding of the information discussed and has elected to proceed with surgical intervention: Right open carpal tunnel release and cubital tunnel release, possibe ulnar nerve transposition.. All questions were answered. We will contact her to schedule surgery.       Scribe Disclosure:   I, Efraín Barajas, am serving as a scribe to document services personally performed by Marcos Saucedo MD at this visit, based upon the provider's statements to me. All documentation has been reviewed by the aforementioned provider prior to being entered into the official medical record.     Portions of this medical record were completed by a scribe. UPON MY REVIEW AND AUTHENTICATION BY ELECTRONIC SIGNATURE, this confirms (a) I performed the applicable clinical services, and (b) the record is accurate.       Again, thank you for allowing me to participate in the care of your patient.      Sincerely,    Marcos Saucedo MD

## 2018-08-13 NOTE — PROGRESS NOTES
"[facility]  Orthopedics  Marcos Saucedo MD  2018     Name: Nataliya Lei  MRN: [id number]  Age: 43 year old  : 1975  Referring provider: Jaquan Pires     Chief Complaint:  Bilateral carpal tunnel syndrome    History of Present Illness:   Nataliya Lei is a 43 year old female who presents today for evaluation of bilateral hand numbness and tingling. Numbness and tingling effects the thumb, index, long (middle) and ring. Symptoms first began \"a long time ago\". There was not an inciting event. Symptoms do wake the patient up at night. They improve when she shakes out her hands. The following treatments been tried: corticosteroid injection into the carpal tunnel and bracing, which have only alleviated symptoms briefly. Also expresses concern for \"shooting\" sensation from the elbow medial elbow and occasional associated altered sensation in the small fingers. Right side symptoms are much worse than left.     Review of Systems:   A 14-point review of systems was obtained and is negative except for as noted in the HPI.     Medications:   The patient denies any regular medication use.     Allergies:  Allergies   Allergen Reactions     Blood-Group Specific Substance      Anti [place] antibody present       Past Medical History:  The patient does not have any pertinent past medical history.     Past Surgical History:  The patient does not have any pertinent past surgical history.      Family History:  Negative for bleeding or clotting disorders or adverse reactions to anesthesia.    Physical Examination:  Height 1.499 m (4' 11\"), weight 50.8 kg (112 lb), unknown if currently breastfeeding.   Bilateral upper extremities:  Skin clean, dry and intact. No thenar or hypothenar atrophy or deformity. Negative Tinel's over the carpal tunnel on the right, positive on the left. Positive Tinel's at the cubital tunnel bilaterally. Positive Phalen's bilaterally. Positive carpal compression test bilaterally. Static two point " discrimination is 5 mm to the right ulnar, 6 mm to the right median, 4 mm to the left ulnar and 3 mm to the left median. Sensation intact to light touch radial nerve disruption bilaterally and in ulnar nerve distribution on left, altered in median nerve distribution bilaterally and in ulnar freda distributionon right is altered. No subluxation of the ulnar nerve at the elbow bilaterally.     Electrodiagnostic Studies:   An electrodiagnostic study of the bilateral extremities was available for review today.  This was completed on June 06, 2018 and demonstrated mild to moderate right greater than left bilateral carpal tunnel syndrome, progressed since 2012.     Xrays: Three views were obtained of the bilateral hands and demonstrate no bony abnormalities.     Assessment:   43 year old female with bilateral carpal tunnel syndrome and cubital tunnel syndrome.    Plan:   Discussed with the patient, in detail, treatment options available to her. Symptoms have persisted despite bracing and injections. Options include continued bracing versus surgical intervention in the form of open carpal tunnel release or open carpal tunnel release and cubital tunnel release, possible ulnar nerve transposition. I believe that the majority of her symptoms are from carpal tunnel syndrome but there appears to be some element of cubital tunnel as well with a positive tinels and shooting pains from the cubital tunnel area into the small finger. The patient prefers to address both problems, the right side first. Risks an benefits of the surgical intervention was discussed in detail. Specific risks discussed include infection, bleeding, pain,scarring, damage to nerves, blood vessels, or other nearby structures, stiffness, complex regional pain syndrome, worsening or failure of symptoms to improve, need for further surgery, and risks of anesthesia including heart attack, stroke and death. Patient voiced understanding of the information discussed  and has elected to proceed with surgical intervention: Right open carpal tunnel release and cubital tunnel release, possibe ulnar nerve transposition.. All questions were answered. We will contact her to schedule surgery.       Scribe Disclosure:   I, Efraín Barajas, am serving as a scribe to document services personally performed by Marcos Saucedo MD at this visit, based upon the provider's statements to me. All documentation has been reviewed by the aforementioned provider prior to being entered into the official medical record.     Portions of this medical record were completed by a scribe. UPON MY REVIEW AND AUTHENTICATION BY ELECTRONIC SIGNATURE, this confirms (a) I performed the applicable clinical services, and (b) the record is accurate.         Marcos Saucedo MD

## 2018-08-14 NOTE — TELEPHONE ENCOUNTER
FUTURE VISIT INFORMATION      FUTURE VISIT INFORMATION:    Date: 08/24/2018    Time: 3:30    Location: [facility]  REFERRAL INFORMATION:    Referring provider:  Everardo Suarez MD    Referring providers clinic:   NEUROLOGY    Reason for visit/diagnosis  Sensorineural hearing loss, unilateral    RECORDS REQUESTED FROM:       Clinic name Comments Records Status Imaging Status     NEUROLOGY OFFICE VISIT:  08/01/2018 INTERNAL N/A                                   RECORDS STATUS    SEE CARE EVERYWHERE OFFICE VISIT: 06/01/2018, 11/14/2017, 09/14/2017

## 2018-08-15 ENCOUNTER — TELEPHONE (OUTPATIENT)
Dept: ORTHOPEDICS | Facility: CLINIC | Age: 43
End: 2018-08-15

## 2018-08-15 NOTE — TELEPHONE ENCOUNTER
Patient is scheduled for surgery with Dr. Saucedo    Spoke or left message with: patient    Date of Surgery: 8/30/18    Location: [facility]    Pre-op with surgeon (if applicable):    H&P: Scheduled with    Post op:     Special Equipment:     Additional imaging/appointments:     Surgery packet sent: was given at clinic appointment    Additional comments: will wait for pre op nurse phone call 1-2 days prior for arrival time.

## 2018-08-15 NOTE — TELEPHONE ENCOUNTER
Called patient to schedule her surgery with Dr Saucedo (holding 8/23) gave patient my number [phone] to call back to schedule.

## 2018-08-18 DIAGNOSIS — H90.8 MIXED HEARING LOSS: Primary | ICD-10-CM

## 2018-08-20 ENCOUNTER — OFFICE VISIT - HEALTHEAST (OUTPATIENT)
Dept: FAMILY MEDICINE | Facility: CLINIC | Age: 43
End: 2018-08-20

## 2018-08-20 ENCOUNTER — COMMUNICATION - HEALTHEAST (OUTPATIENT)
Dept: FAMILY MEDICINE | Facility: CLINIC | Age: 43
End: 2018-08-20

## 2018-08-20 DIAGNOSIS — T80.A0XA: ICD-10-CM

## 2018-08-20 DIAGNOSIS — Z01.818 PRE-OP EXAM: ICD-10-CM

## 2018-08-20 DIAGNOSIS — G56.03 BILATERAL CARPAL TUNNEL SYNDROME: ICD-10-CM

## 2018-08-20 LAB
HCG UR QL: NEGATIVE
SP GR UR STRIP: 1.01 (ref 1–1.03)

## 2018-08-20 ASSESSMENT — MIFFLIN-ST. JEOR: SCORE: 1068.19

## 2018-08-24 ENCOUNTER — OFFICE VISIT (OUTPATIENT)
Dept: AUDIOLOGY | Facility: CLINIC | Age: 43
End: 2018-08-24
Payer: COMMERCIAL

## 2018-08-24 ENCOUNTER — PRE VISIT (OUTPATIENT)
Dept: OTOLARYNGOLOGY | Facility: CLINIC | Age: 43
End: 2018-08-24

## 2018-08-24 ENCOUNTER — RECORDS - HEALTHEAST (OUTPATIENT)
Dept: ADMINISTRATIVE | Facility: OTHER | Age: 43
End: 2018-08-24

## 2018-08-24 ENCOUNTER — OFFICE VISIT (OUTPATIENT)
Dept: OTOLARYNGOLOGY | Facility: CLINIC | Age: 43
End: 2018-08-24
Payer: COMMERCIAL

## 2018-08-24 VITALS — BODY MASS INDEX: 22.78 KG/M2 | WEIGHT: 113 LBS | HEIGHT: 59 IN

## 2018-08-24 DIAGNOSIS — R42 DIZZINESS: Primary | ICD-10-CM

## 2018-08-24 DIAGNOSIS — R42 DIZZINESS AND GIDDINESS: Primary | ICD-10-CM

## 2018-08-24 ASSESSMENT — PAIN SCALES - GENERAL: PAINLEVEL: NO PAIN (0)

## 2018-08-24 NOTE — MR AVS SNAPSHOT
After Visit Summary   8/24/2018    Nataliya Lei    MRN: [id number]           Patient Information     Date Of Birth          1975        Visit Information        Provider Department      8/24/2018 2:30 PM Angela Hunter AuD [facility] Audiology        Today's Diagnoses     Dizziness and giddiness    -  1       Follow-ups after your visit        Your next 10 appointments already scheduled     Aug 24, 2018  3:30 PM CDT   (Arrive by 3:15 PM)   New Patient Visit with Jorge Be MD   [facility] Ear Nose and Throat ([facility])    [address] [place] [id number] [phone]            Aug 30, 2018   Procedure with Marcos Saucedo MD   [facility] ([facility])    [address] [place] [id number] [phone]           Located in the Clinics and Surgery Center at [address] [place] [address].   parking is very convenient and highly recommended.  is a $6 flat rate fee.  Both  and self parkers should enter the main arrival plaza from [facility]; parking attendants will direct you based on your parking preference.            Sep 12, 2018 11:45 AM CDT   (Arrive by 11:30 AM)   RETURN HAND with Marcos Saucedo MD   [facility] ([facility])    [address] [place] [id number] [phone]            Oct 10, 2018  2:30 PM CDT   (Arrive by 2:15 PM)   RETURN HAND with Marcos Saucedo MD   [facility] ([facility])    [address] [place] [id number] [phone]            Feb 06, 2019  3:30 PM CST   (Arrive by 3:15 PM)   Return Visit with Everardo Suarez MD   [facility] Neurology ([facility])    [address] [place] [id number] [phone]               Who to contact     Please call your clinic at [phone] to:    Ask questions about your health    Make or cancel appointments    Discuss your medicines    Learn about your test results    Speak to your doctor            Additional Information About Your Visit        MyChart Information     [employer] is an electronic gateway that provides easy, online access to your medical records. With [employer], you can request a clinic appointment, read your test results, renew a prescription or communicate with your care team.     To sign up for [employer] visit the website at [email or web address]   You will be asked to enter the access code listed below, as well as some personal information. Please follow the directions to create your username and password.     Your access code is: [email or web address]  Expires: 2018  6:31 AM     Your access code will  in 90 days. If you need help or a new code, please contact your [facility] or call [phone] for assistance.        Care EveryWhere ID     This is your Care EveryWhere ID. This could be used by other organizations to access your [place] medical records  [id number]         Blood Pressure from Last 3 Encounters:   18 92/54    Weight from Last 3 Encounters:   18 50.8 kg (112 lb)   18 50.8 kg (112 lb)   18 51 kg (112 lb 6.4 oz)              We Performed the Following     AUDIOGRAM/TYMPANOGRAM - INTERFACE     [facility] Eval [facility] Speech Recog ([address])     Tymps / Reflex   ([address])        Primary Care Provider Office Phone # Fax #    Leti UNDERWOOD Issa [phone]       [facility]  [address] [address]        Equal Access to Services     [facility] DEBRA : Hadii aad ku hadasho Soomaali, waaxda luqadaha, qaybta kaalmada adeegyada, tani parrish. So [facility] [phone].    ATENCIÓN: Si habla español, tiene a gilbert disposición servicios gratuitos de  rosina lingüística. Salvador al [phone].    We comply with applicable federal civil rights laws and [place] laws. We do not discriminate on the basis of race, color, national origin, age, disability, sex, sexual orientation, or gender identity.            Thank you!     Thank you for choosing [facility] AUDIOLOGY  for your care. Our goal is always to provide you with excellent care. Hearing back from our patients is one way we can continue to improve our services. Please take a few minutes to complete the written survey that you may receive in the mail after your visit with us. Thank you!             Your Updated Medication List - Protect others around you: Learn how to safely use, store and throw away your medicines at www.disposemymeds.org.      Notice  As of 8/24/2018  3:11 PM    You have not been prescribed any medications.

## 2018-08-24 NOTE — LETTER
8/24/2018     RE: Nataliya Lei  [address] [place] [address]     Dear Colleague,    Thank you for referring your patient, Nataliya Lei, to the [facility] [place] EAR NOSE AND THROAT at [facility]. Please see a copy of my visit note below.    The patient presents with a history of dizziness.  The patient reports that the symptoms began approximately ten months ago with a sudden event of vertigo with pressure in the right ear. The patient reports intermittent events of dizziness since that time. The patient has had associated nausea with the events.  She denies hearing loss or tinnitus.  The patient has had no weakness in the extremities or facial weakness or slurring of speech. The patient denies sinusitis, rhinitis, facial pain, nasal obstruction or purulent nasal discharge. The patient denies chronic or recurrent tonsillitis, chronic or recurrent pharyngitis. The patient's Audiogram and Tympanogram are reviewed with her and they demonstrate borderline normal hearing bilaterally with 92% word recognition in the right ear and 100% word recognition in the left. Her tympanograms are normal.     This patient is seen in consultation at the request of Dr. Everardo Suarez.    All other systems were reviewed and they are either negative or they are not directly pertinent to this Otolaryngology examination.      Past Medical History:    Past Medical History:   Diagnosis Date     Hearing problem        Past Surgical History:    No past surgical history on file.    Medications:    No current outpatient prescriptions on file.    Allergies:    Blood-group specific substance    Physical Examination:    The patient is a well developed, well nourished female in no apparent distress.  She is normocephalic, atraumatic with pupils equally round and reactive to light.    Oral Cavity Examination:  Normal mucosa with no masses or lesions  Nasal Examination: Normal mucosa with no masses or lesions  Ear  Examination: Ear canal on the left is clear and ear canal on the right is impacted with cerumen. The ear canal is cleaned of cerumen using an alligator forceps using a binocular microscope for visualization.  The tympanic membranes and middle ear spaces normal  Neurological Examination: Facial nerve function intact and symmetric.  The patient has no gaze induced or spontaneous nystagmus.  Past pointing examination is normal.  Integumentary Examination: No lesions on the skin of the head and neck  Neck Examination: No masses or lesions, no lymphadenopathy  Endocrine Examination: Normal thyroid examination    Assessment and Plan:    The patient presents with a history of dizziness.  The patient and I have discussed proceeding with further evaluation of the symptoms.  The patient will be referred for vestibular evaluation and an MRI scan of the head and temporal bones. She will be seen again after this testing is completed.     Again, thank you for allowing me to participate in the care of your patient.      Sincerely,    Jorge Be MD      CC: Dr. Everardo Suarez

## 2018-08-24 NOTE — PROGRESS NOTES
The patient presents with a history of dizziness.  The patient reports that the symptoms began approximately ten months ago with a sudden event of vertigo with pressure in the right ear. The patient reports intermittent events of dizziness since that time. The patient has had associated nausea with the events.  She denies hearing loss or tinnitus.  The patient has had no weakness in the extremities or facial weakness or slurring of speech. The patient denies sinusitis, rhinitis, facial pain, nasal obstruction or purulent nasal discharge. The patient denies chronic or recurrent tonsillitis, chronic or recurrent pharyngitis. The patient's Audiogram and Tympanogram are reviewed with her and they demonstrate borderline normal hearing bilaterally with 92% word recognition in the right ear and 100% word recognition in the left. Her tympanograms are normal.     This patient is seen in consultation at the request of Dr. Everardo Suarez.    All other systems were reviewed and they are either negative or they are not directly pertinent to this Otolaryngology examination.      Past Medical History:    Past Medical History:   Diagnosis Date     Hearing problem        Past Surgical History:    No past surgical history on file.    Medications:    No current outpatient prescriptions on file.    Allergies:    Blood-group specific substance    Physical Examination:    The patient is a well developed, well nourished female in no apparent distress.  She is normocephalic, atraumatic with pupils equally round and reactive to light.    Oral Cavity Examination:  Normal mucosa with no masses or lesions  Nasal Examination: Normal mucosa with no masses or lesions  Ear Examination: Ear canal on the left is clear and ear canal on the right is impacted with cerumen. The ear canal is cleaned of cerumen using an alligator forceps using a binocular microscope for visualization.  The tympanic membranes and middle ear spaces normal  Neurological  Examination: Facial nerve function intact and symmetric.  The patient has no gaze induced or spontaneous nystagmus.  Past pointing examination is normal.  Integumentary Examination: No lesions on the skin of the head and neck  Neck Examination: No masses or lesions, no lymphadenopathy  Endocrine Examination: Normal thyroid examination    Assessment and Plan:    The patient presents with a history of dizziness.  The patient and I have discussed proceeding with further evaluation of the symptoms.  The patient will be referred for vestibular evaluation and an MRI scan of the head and temporal bones. She will be seen again after this testing is completed.     CC: Dr. Everardo Suarez

## 2018-08-24 NOTE — NURSING NOTE
"Chief Complaint   Patient presents with     Consult     sensorineural hearing loss     Height 1.51 m (4' 11.45\"), weight 51.3 kg (113 lb), unknown if currently breastfeeding.    Hitesh Rodriguez LPN    "

## 2018-08-24 NOTE — MR AVS SNAPSHOT
After Visit Summary   8/24/2018    Nataliya Lei    MRN: [id number]           Patient Information     Date Of Birth          1975        Visit Information        Provider Department      8/24/2018 3:30 PM Jorge Be MD [facility] Ear Nose and Throat        Today's Diagnoses     Dizziness    -  1      Care Instructions    The patient presents with a history of dizziness.  The patient and I have discussed proceeding with further evaluation of the symptoms.  The patient will be referred for vestibular evaluation and an MRI scan of the head and temporal bones. She will be seen again after this testing is completed.           Follow-ups after your visit        Additional Services     AUDIOLOGY BALANCE REFERRAL       VNG, ECOG, Caloric Testing                  Your next 10 appointments already scheduled     Aug 28, 2018  6:45 PM CDT   (Arrive by 6:15 PM)   MR BRAIN W/O & W CONTRAST with URMR1   [facility] [place], MRI ([facility])    [address] [place] [id number] [phone]           Take your medicines as usual, unless your doctor tells you not to. Bring a list of your current medicines to your exam (including vitamins, minerals and over-the-counter drugs).  You may or may not receive intravenous (IV) contrast for this exam pending the discretion of the Radiologist.  You do not need to do anything special to prepare.  The MRI machine uses a strong magnet. Please wear clothes without metal (snaps, zippers). A sweatsuit works well, or we may give you a hospital gown.  Please remove any body piercings and hair extensions before you arrive. You will also remove watches, jewelry, hairpins, wallets, dentures, partial dental plates and hearing aids. You may wear contact lenses, and you may be able to wear your rings. We have a safe place to keep your personal items, but it is safer to leave them at home.  **IMPORTANT** THE INSTRUCTIONS  BELOW ARE ONLY FOR THOSE PATIENTS WHO HAVE BEEN PRESCRIBED SEDATION OR GENERAL ANESTHESIA DURING THEIR MRI PROCEDURE:  IF YOUR DOCTOR PRESCRIBED ORAL SEDATION (take medicine to help you relax during your exam):   You must get the medicine from your doctor (oral medication) before you arrive. Bring the medicine to the exam. Do not take it at home. You ll be told when to take it upon arriving for your exam.   Arrive one hour early. Bring someone who can take you home after the test. Your medicine will make you sleepy. After the exam, you may not drive, take a bus or take a taxi by yourself.  IF YOUR DOCTOR PRESCRIBED IV SEDATION:   Arrive one hour early. Bring someone who can take you home after the test. Your medicine will make you sleepy. After the exam, you may not drive, take a bus or take a taxi by yourself.   No eating 6 hours before your exam. You may have clear liquids up until 4 hours before your exam. (Clear liquids include water, clear tea, black coffee and fruit juice without pulp.)  IF YOUR DOCTOR PRESCRIBED ANESTHESIA (be asleep for your exam):   Arrive 1 1/2 hours early. Bring someone who can take you home after the test. You may not drive, take a bus or take a taxi by yourself.   No eating 8 hours before your exam. You may have clear liquids up until 4 hours before your exam. (Clear liquids include water, clear tea, black coffee and fruit juice without pulp.)   You will spend four to five hours in the recovery room.  Please call the Imaging Department at your exam site with any questions.            Aug 30, 2018   Procedure with Marcos Saucedo MD   [facility] ([facility])    [address] [place] [id number] [phone]           Located in the Clinics and Surgery Center at [address] [place] [address].   parking is very convenient and highly recommended.  is a $6 flat rate fee.  Both  and  self parkers should enter the main arrival plaza from [facility]; parking attendants will direct you based on your parking preference.            Sep 04, 2018  8:30 AM CDT   Electrocochleography with Donna Meza, CHICHO GLASGOW ABR MACHINE [address] [place] [address] Audiology ([facility])    [address] [facility]  4th [facility] [address] [phone]           ECO: Electrocochleography  How is the test done?  The test records electrical signals made by the inner ear and auditory nerve.   First, we clean the skin before placing sticky patches (electrodes) in several places on the head and shoulders.   One more electrode is placed in the ear canal. This is a small piece of cotton, soaked in water, connected by a tiny wire.   The examiner looks through a microscope to place the electrode on the eardrum. Most patients feel a tickle or slight pressure when this is done. A few patients feel mild discomfort.   After the cotton is in place, most patients do not feel it at all. A foam plug placed in the ear canal holds the electrode in place.   The plug also serves as an earphone for sounds presented as part of the test. The patient hears very rapid clicks that are loud but usually not uncomfortable.   The patient lies on a bed and should try to relax. Many people sleep through the test.  ABR (auditory brainstem response) test The ABR test is usually done at the same time as ECOG. This test records electrical signals made by the brain when it hears sounds. The electrodes can also record brain signals so that both tests can be done at the same time. The two tests together can help find out where your symptoms come from. The two tests usually take about 2 hours. It takes another hour to read the results and write a report. The report is sent to the referring doctor who will choose a treatment.             Sep 12, 2018 11:45 AM CDT   (Arrive by 11:30 AM)   RETURN HAND with Marcos Saucedo MD    [facility] ([facility])    [address] [place] [address]   [phone]            Oct 01, 2018 10:00 AM CDT   (Arrive by 9:45 AM)   Balance Testing with Bulmaro Starkskathia   [facility] Audiology ([facility])    [address] [place] [address]   [phone]           You are scheduled for the following tests: VNG (Videonystagmography). You will wear goggles with small cameras. These record small eye movements as you change position. This test takes 1 to 1 1/2 hours. Rotational Chair. You will sit a chair that has a motor. The room will be dark. You will wear goggles with a camera while the chair rocks slowly from side to side. This test takes 20 to 30 minutes. CDP (Computerized Dynamic Posturography). You will stand on a platform with your eyes open or closed. It will be unsteady at times. This will tell us how your balance systems work together and how well you sense the ground under your feet. This test takes 30 minutes.  Why am I having these tests? These are tests for your inner ear. They may help us find out why you feel dizzy or off balance.  How do I prepare? Below is a list of things that can affect test results. Do NOT take these for 48 hours before your tests or we will need to reschedule. We want to make sure your test results are correct. Ask your doctor if you have questions about stopping any medicine.  48 hours before the tests: Stop drinking alcohol (beer, wine, liquor). Do NOT take Amitriptyline. Do NOT take medicines for: Allergy or colds. Examples: Benadryl (diphenhydramine), Allegra (fexofenadine), Zyrtec (cetirizine) and any over-the-counter medicine that may cause drowsiness. Anti-anxiety, unless you have been on them every day for the past 8 weeks or more. Examples: Xanax (alprazolam), Klonopin (clonazepam), Valium (diazepam), Ativan (lorazepam). Dizziness and nausea. Examples:  Antivert/Bonine/Dramamine Less-Drowsy Formula (meclizine), Dramamine (dimenhydrinate), Trans-derm Scop (Scopolamine), Compazine (prochlorperazine), Phenergan (promethazine). Sleeping. This includes sleeping pills, sedatives, tranquilizers, muscle relaxants and narcotics. It is okay to take medicines for diabetes, heart, blood pressure, seizures, thyroid or an ongoing condition.  The day of the tests:   Please have a  with you.   Do not have caffeine (coffee, soda, chocolate, energy drinks).   Do not smoke or have nicotine for at least 4 hours before the tests. This includes tobacco, e-cigarettes and nicotine gum.   Do not eat 2 to 3 hours before the tests, unless you have diabetes. Then, you should follow your usual diet.   Do not wear any make-up or lotions around your eyes or eyelids.   If you wear contact lenses, be prepared to take them out for testing; or wear your glasses.   If you take the anti-nausea medicine Zofran (ondansetron), you may bring it with you to take after your tests.  Who should I call if I have questions? If you have any questions, please call the Balance Center at [facility]: [phone]            Oct 04, 2018  4:30 PM CDT   (Arrive by 4:15 PM)   Return Visit with Jorge Be MD   [facility] Ear Nose and Throat ([facility])    [address] [place] [address]  4th [facility] [address]   [phone]            Oct 10, 2018  2:30 PM CDT   (Arrive by 2:15 PM)   RETURN HAND with Marcos Saucedo MD   [facility] ([facility])    [address] [place] [address]  [address]th [facility] [id number] [phone]            Feb 06, 2019  3:30 PM CST   (Arrive by 3:15 PM)   Return Visit with Everardo Suarez MD   [facility] ([facility])    [address] [place] [address]  3rd [facility] [id number] [phone]              Future tests that were ordered for you  "today     Open Future Orders        Priority Expected Expires Ordered    MRI Brain w & w/o contrast Routine  2019    Urea nitrogen Routine  2019    Creatinine Routine  2019            Who to contact     Please call your clinic at [phone] to:    Ask questions about your health    Make or cancel appointments    Discuss your medicines    Learn about your test results    Speak to your doctor            Additional Information About Your Visit        [employer] Information     [employer] is an electronic gateway that provides easy, online access to your medical records. With [employer], you can request a clinic appointment, read your test results, renew a prescription or communicate with your care team.     To sign up for [employer] visit the website at [email or web address]   You will be asked to enter the access code listed below, as well as some personal information. Please follow the directions to create your username and password.     Your access code is: [email or web address]  Expires: 2018  6:31 AM     Your access code will  in 90 days. If you need help or a new code, please contact your [facility] or call [phone] for assistance.        Care EveryWhere ID     This is your Care EveryWhere ID. This could be used by other organizations to access your [facility] medical records  [id number]        Your Vitals Were     Height BMI (Body Mass Index)                1.51 m (4' 11.45\") 22.48 kg/m2           Blood Pressure from Last 3 Encounters:   18 92/54    Weight from Last 3 Encounters:   18 51.3 kg (113 lb)   18 50.8 kg (112 lb)   18 50.8 kg (112 lb)              We Performed the Following     AUDIOLOGY BALANCE REFERRAL        Primary Care Provider Office Phone # Fax #    Leti Parsons [phone]       [facility] AEDLITA [facility] DMITRIY [address] [address]        Equal Access to Services     [facility] " GAAR : Hadii gomez covington naa Verde, wachelada luqadaha, qaybta karivas bozenashlomogita, waxaev aba hayjaylen mckeonnghiatrevon parrish. So [facility] [phone].    ATENCIÓN: Si habla español, tiene a gilbert disposición servicios gratuitos de asistencia lingüística. Llame al [phone].    We comply with applicable federal civil rights laws and [place] laws. We do not discriminate on the basis of race, color, national origin, age, disability, sex, sexual orientation, or gender identity.            Thank you!     Thank you for choosing [facility] EAR NOSE AND THROAT  for your care. Our goal is always to provide you with excellent care. Hearing back from our patients is one way we can continue to improve our services. Please take a few minutes to complete the written survey that you may receive in the mail after your visit with us. Thank you!             Your Updated Medication List - Protect others around you: Learn how to safely use, store and throw away your medicines at www.disposemymeds.org.      Notice  As of 8/24/2018  3:51 PM    You have not been prescribed any medications.

## 2018-08-24 NOTE — PATIENT INSTRUCTIONS
The patient presents with a history of dizziness.  The patient and I have discussed proceeding with further evaluation of the symptoms.  The patient will be referred for vestibular evaluation and an MRI scan of the head and temporal bones. She will be seen again after this testing is completed.

## 2018-08-24 NOTE — PROGRESS NOTES
AUDIOLOGY REPORT    SUMMARY: Audiology visit completed. See audiogram for results.      RECOMMENDATIONS: Follow-up with ENT.        Wicho Stuart, CCC-A  Licensed Audiologist  [place] #[id number]

## 2018-08-28 ENCOUNTER — HOSPITAL ENCOUNTER (OUTPATIENT)
Dept: MRI IMAGING | Facility: CLINIC | Age: 43
Discharge: HOME | End: 2018-08-28
Attending: OTOLARYNGOLOGY | Admitting: OTOLARYNGOLOGY
Payer: COMMERCIAL

## 2018-08-28 ENCOUNTER — RECORDS - HEALTHEAST (OUTPATIENT)
Dept: ADMINISTRATIVE | Facility: OTHER | Age: 43
End: 2018-08-28

## 2018-08-28 DIAGNOSIS — R42 DIZZINESS: ICD-10-CM

## 2018-08-28 PROCEDURE — 25000128 H RX IP 250 OP 636: Performed by: OTOLARYNGOLOGY

## 2018-08-28 PROCEDURE — A9585 GADOBUTROL INJECTION: HCPCS | Performed by: OTOLARYNGOLOGY

## 2018-08-28 PROCEDURE — 70553 MRI BRAIN STEM W/O & W/DYE: CPT

## 2018-08-28 RX ORDER — GADOBUTROL 604.72 MG/ML
7.5 INJECTION INTRAVENOUS ONCE
Status: COMPLETED | OUTPATIENT
Start: 2018-08-28 | End: 2018-08-28

## 2018-08-28 RX ADMIN — GADOBUTROL 5 ML: 604.72 INJECTION INTRAVENOUS at 18:15

## 2018-08-29 ENCOUNTER — ANESTHESIA EVENT (OUTPATIENT)
Dept: SURGERY | Facility: AMBULATORY SURGERY CENTER | Age: 43
End: 2018-08-29

## 2018-08-30 ENCOUNTER — ANESTHESIA (OUTPATIENT)
Dept: SURGERY | Facility: AMBULATORY SURGERY CENTER | Age: 43
End: 2018-08-30

## 2018-08-30 ENCOUNTER — TELEPHONE (OUTPATIENT)
Dept: OTOLARYNGOLOGY | Facility: CLINIC | Age: 43
End: 2018-08-30

## 2018-08-30 ENCOUNTER — HOSPITAL ENCOUNTER (OUTPATIENT)
Facility: AMBULATORY SURGERY CENTER | Age: 43
End: 2018-08-30
Attending: ORTHOPAEDIC SURGERY
Payer: COMMERCIAL

## 2018-08-30 ENCOUNTER — SURGERY (OUTPATIENT)
Age: 43
End: 2018-08-30

## 2018-08-30 VITALS
TEMPERATURE: 97.6 F | BODY MASS INDEX: 22.19 KG/M2 | SYSTOLIC BLOOD PRESSURE: 141 MMHG | DIASTOLIC BLOOD PRESSURE: 70 MMHG | HEIGHT: 60 IN | RESPIRATION RATE: 14 BRPM | OXYGEN SATURATION: 100 % | WEIGHT: 113 LBS

## 2018-08-30 DIAGNOSIS — Z98.890 POST-OPERATIVE STATE: Primary | ICD-10-CM

## 2018-08-30 RX ORDER — GABAPENTIN 300 MG/1
300 CAPSULE ORAL ONCE
Status: COMPLETED | OUTPATIENT
Start: 2018-08-30 | End: 2018-08-30

## 2018-08-30 RX ORDER — MEPERIDINE HYDROCHLORIDE 25 MG/ML
12.5 INJECTION INTRAMUSCULAR; INTRAVENOUS; SUBCUTANEOUS
Status: DISCONTINUED | OUTPATIENT
Start: 2018-08-30 | End: 2018-08-31 | Stop reason: HOSPADM

## 2018-08-30 RX ORDER — PROPOFOL 10 MG/ML
INJECTION, EMULSION INTRAVENOUS PRN
Status: DISCONTINUED | OUTPATIENT
Start: 2018-08-30 | End: 2018-08-30

## 2018-08-30 RX ORDER — NALOXONE HYDROCHLORIDE 0.4 MG/ML
.1-.4 INJECTION, SOLUTION INTRAMUSCULAR; INTRAVENOUS; SUBCUTANEOUS
Status: DISCONTINUED | OUTPATIENT
Start: 2018-08-30 | End: 2018-08-31 | Stop reason: HOSPADM

## 2018-08-30 RX ORDER — PROPOFOL 10 MG/ML
INJECTION, EMULSION INTRAVENOUS CONTINUOUS PRN
Status: DISCONTINUED | OUTPATIENT
Start: 2018-08-30 | End: 2018-08-30

## 2018-08-30 RX ORDER — HYDROMORPHONE HYDROCHLORIDE 1 MG/ML
.3-.5 INJECTION, SOLUTION INTRAMUSCULAR; INTRAVENOUS; SUBCUTANEOUS EVERY 10 MIN PRN
Status: DISCONTINUED | OUTPATIENT
Start: 2018-08-30 | End: 2018-08-31 | Stop reason: HOSPADM

## 2018-08-30 RX ORDER — ACETAMINOPHEN 325 MG/1
975 TABLET ORAL ONCE
Status: COMPLETED | OUTPATIENT
Start: 2018-08-30 | End: 2018-08-30

## 2018-08-30 RX ORDER — ONDANSETRON 2 MG/ML
4 INJECTION INTRAMUSCULAR; INTRAVENOUS EVERY 30 MIN PRN
Status: DISCONTINUED | OUTPATIENT
Start: 2018-08-30 | End: 2018-08-31 | Stop reason: HOSPADM

## 2018-08-30 RX ORDER — SODIUM CHLORIDE, SODIUM LACTATE, POTASSIUM CHLORIDE, CALCIUM CHLORIDE 600; 310; 30; 20 MG/100ML; MG/100ML; MG/100ML; MG/100ML
INJECTION, SOLUTION INTRAVENOUS CONTINUOUS
Status: DISCONTINUED | OUTPATIENT
Start: 2018-08-30 | End: 2018-08-31 | Stop reason: HOSPADM

## 2018-08-30 RX ORDER — DEXAMETHASONE SODIUM PHOSPHATE 4 MG/ML
INJECTION, SOLUTION INTRA-ARTICULAR; INTRALESIONAL; INTRAMUSCULAR; INTRAVENOUS; SOFT TISSUE PRN
Status: DISCONTINUED | OUTPATIENT
Start: 2018-08-30 | End: 2018-08-30

## 2018-08-30 RX ORDER — HYDROCODONE BITARTRATE AND ACETAMINOPHEN 5; 325 MG/1; MG/1
1 TABLET ORAL EVERY 6 HOURS PRN
Qty: 15 TABLET | Refills: 0 | Status: SHIPPED | OUTPATIENT
Start: 2018-08-30 | End: 2018-09-27

## 2018-08-30 RX ORDER — FENTANYL CITRATE 50 UG/ML
25-50 INJECTION, SOLUTION INTRAMUSCULAR; INTRAVENOUS
Status: DISCONTINUED | OUTPATIENT
Start: 2018-08-30 | End: 2018-08-31 | Stop reason: HOSPADM

## 2018-08-30 RX ORDER — ONDANSETRON 2 MG/ML
INJECTION INTRAMUSCULAR; INTRAVENOUS PRN
Status: DISCONTINUED | OUTPATIENT
Start: 2018-08-30 | End: 2018-08-30

## 2018-08-30 RX ORDER — ONDANSETRON 4 MG/1
4 TABLET, ORALLY DISINTEGRATING ORAL EVERY 30 MIN PRN
Status: DISCONTINUED | OUTPATIENT
Start: 2018-08-30 | End: 2018-08-31 | Stop reason: HOSPADM

## 2018-08-30 RX ORDER — KETOROLAC TROMETHAMINE 30 MG/ML
INJECTION, SOLUTION INTRAMUSCULAR; INTRAVENOUS PRN
Status: DISCONTINUED | OUTPATIENT
Start: 2018-08-30 | End: 2018-08-30

## 2018-08-30 RX ORDER — FENTANYL CITRATE 50 UG/ML
INJECTION, SOLUTION INTRAMUSCULAR; INTRAVENOUS PRN
Status: DISCONTINUED | OUTPATIENT
Start: 2018-08-30 | End: 2018-08-30

## 2018-08-30 RX ORDER — LIDOCAINE 40 MG/G
CREAM TOPICAL
Status: DISCONTINUED | OUTPATIENT
Start: 2018-08-30 | End: 2018-08-31 | Stop reason: HOSPADM

## 2018-08-30 RX ADMIN — ONDANSETRON 4 MG: 2 INJECTION INTRAMUSCULAR; INTRAVENOUS at 08:35

## 2018-08-30 RX ADMIN — PROPOFOL 50 MG: 10 INJECTION, EMULSION INTRAVENOUS at 08:45

## 2018-08-30 RX ADMIN — KETOROLAC TROMETHAMINE 30 MG: 30 INJECTION, SOLUTION INTRAMUSCULAR; INTRAVENOUS at 09:23

## 2018-08-30 RX ADMIN — FENTANYL CITRATE 25 MCG: 50 INJECTION, SOLUTION INTRAMUSCULAR; INTRAVENOUS at 09:03

## 2018-08-30 RX ADMIN — FENTANYL CITRATE 25 MCG: 50 INJECTION, SOLUTION INTRAMUSCULAR; INTRAVENOUS at 09:19

## 2018-08-30 RX ADMIN — GABAPENTIN 300 MG: 300 CAPSULE ORAL at 08:09

## 2018-08-30 RX ADMIN — ACETAMINOPHEN 975 MG: 325 TABLET ORAL at 08:11

## 2018-08-30 RX ADMIN — PROPOFOL 150 MCG/KG/MIN: 10 INJECTION, EMULSION INTRAVENOUS at 08:27

## 2018-08-30 RX ADMIN — PROPOFOL 200 MG: 10 INJECTION, EMULSION INTRAVENOUS at 08:25

## 2018-08-30 RX ADMIN — DEXAMETHASONE SODIUM PHOSPHATE 4 MG: 4 INJECTION, SOLUTION INTRA-ARTICULAR; INTRALESIONAL; INTRAMUSCULAR; INTRAVENOUS; SOFT TISSUE at 08:35

## 2018-08-30 RX ADMIN — SODIUM CHLORIDE, SODIUM LACTATE, POTASSIUM CHLORIDE, CALCIUM CHLORIDE: 600; 310; 30; 20 INJECTION, SOLUTION INTRAVENOUS at 08:19

## 2018-08-30 NOTE — ANESTHESIA PREPROCEDURE EVALUATION
Anesthesia Evaluation     . Pt has had prior anesthetic.     No history of anesthetic complications          ROS/MED HX    ENT/Pulmonary:  - neg pulmonary ROS     Neurologic:  - neg neurologic ROS     Cardiovascular:  - neg cardiovascular ROS       METS/Exercise Tolerance:  >4 METS   Hematologic:  - neg hematologic  ROS       Musculoskeletal:         GI/Hepatic:  - neg GI/hepatic ROS       Renal/Genitourinary:  - ROS Renal section negative       Endo:  - neg endo ROS       Psychiatric:  - neg psychiatric ROS       Infectious Disease:         Malignancy:         Other:                     Physical Exam  Normal systems: dental    Airway   Mallampati: I  TM distance: >3 FB  Neck ROM: full    Dental     Cardiovascular   Rhythm and rate: regular      Pulmonary    breath sounds clear to auscultation                    Anesthesia Plan      History & Physical Review  History and physical reviewed and following examination; no interval change.    ASA Status:  1 .    NPO Status:  > 8 hours    Plan for General and LMA with Intravenous induction. Maintenance will be TIVA.    PONV prophylaxis:  Ondansetron (or other 5HT-3) and Dexamethasone or Solumedrol       Postoperative Care  Postoperative pain management:  Multi-modal analgesia.      Consents  Anesthetic plan, risks, benefits and alternatives discussed with:  Patient.  Use of blood products discussed: No .   .                          .

## 2018-08-30 NOTE — TELEPHONE ENCOUNTER
Left message for pt regarding results of recent MRI. Related per Dr. Be results are normal and pt should follow up as scheduled on 10/4/18 at 4:30pm. Provided contact information should she have any questions or concerns. Yazmin LOPEZ RNCC

## 2018-08-30 NOTE — IP AVS SNAPSHOT
[facility]    [address] [place] [address]    Phone:  [phone]    Fax:  [phone]                                       After Visit Summary   8/30/2018    Nataliya Lei    MRN: [id number]           After Visit Summary Signature Page     I have received my discharge instructions, and my questions have been answered. I have discussed any challenges I see with this plan with the nurse or doctor.    ..........................................................................................................................................  Patient/Patient Representative Signature      ..........................................................................................................................................  Patient Representative Print Name and Relationship to Patient    ..................................................               ................................................  Date                                            Time    ..........................................................................................................................................  Reviewed by Signature/Title    ...................................................              ..............................................  Date                                                            Time          22EPIC Rev 08/18

## 2018-08-30 NOTE — ANESTHESIA CARE TRANSFER NOTE
Patient: Nataliya Lei    Procedure(s):   - Wound Class: I-Clean   - Wound Class: I-Clean    Diagnosis: Right Cubital Tunnel Syndrome and Carpal Tunnel Syndrome  Diagnosis Additional Information: No value filed.    Anesthesia Type:   General, LMA     Note:  Airway :Room Air  Patient transferred to:PACU  Comments: Report to RN    57, 14, 95%, 115/70Handoff Report: Identifed the Patient, Identified the Reponsible Provider, Reviewed the pertinent medical history, Discussed the surgical course, Reviewed Intra-OP anesthesia mangement and issues during anesthesia, Set expectations for post-procedure period and Allowed opportunity for questions and acknowledgement of understanding      Vitals: (Last set prior to Anesthesia Care Transfer)    CRNA VITALS  8/30/2018 0913 - 8/30/2018 0947      8/30/2018             Pulse: 61    Ht Rate: 66    SpO2: 100 %    Resp Rate (observed): 8    Resp Rate (set): 10                Electronically Signed By: KO Rush CRNA  August 30, 2018  9:47 AM

## 2018-08-30 NOTE — OP NOTE
DATE OF SERVICE: August 30, 2018      PREOPERATIVE DIAGNOSIS:  right cubital tunnel syndrome, right carpal tunnel syndrome      POSTOPERATIVE DIAGNOSIS: right cubital tunnel syndrome, right carpal tunnel syndrome      PROCEDURE:  right cubital tunnel in situ release, right open carpal tunnel release      ATTENDING SURGEON:  Marcos Saucedo MD       ASSISTANT:  None      ANESTHESIA: General plus local consisting of 10 mL of 0.5% Marcaine plain and 1% lidocaine plain injected in a 1:1 ratio, with 5 mL at carpal tunnel and 5 mL at cubital tunnel     ESTIMATED BLOOD LOSS:  Minimal      TOURNIQUET TIME:  30 minutes     FINDINGS: Small anconeus epitrochlearis. Mild thickening of transverse carpal ligament. Median nerve mildly hyperemic. Recurrent motor branch of median nerve identified.      SPECIMENS:  None.       DRAINS:  None.       IMPLANTS:  None.       COMPLICATIONS:  None apparent.       BRIEF HISTORY:  The patient is a 43 year old female who presented with numbness and tingling in all five fingers, most notable in thumb, index, middle and ring fingers but present to lesser degree in small finger. She also reported occasional shooting medial sided elbow pain. Physical exam was consistent with carpal and cubital tunnel syndrome. 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 Electrodiagnostic study did not show evidence of cubital tunnel syndrome.  Symptoms were refractory to non-operative management.  I had a discussion with the patient regarding treatment options that would include continued symptomatic management versus surgical intervention in the form of open carpal tunnel release alone versus ulnar nerve decompression with possible anterior subcutaneous transposition and open carpal tunnel release.  I have described the procedure, postoperative protocol and expected outcomes.  I also described the risks, which include bleeding, infection, nerve or vessel damage,  wound healing problems, persistent numbness, persistent pain, and the possibility for further surgery.  Anesthetic risks are rare but include breathing problems, heart problems, stroke, and death.  After full discussion of risks, benefits and alternatives to surgery, the patient has elected to proceed and informed consent was obtained.       DESCRIPTION OF PROCEDURE:  The patient was identified in the preoperative holding area.  The consent was reviewed and signed and the operative site was identified and marked.  The history and physical was reviewed.  The patient was brought to the operating room and transferred to the operating table in a supine position. The arm was placed onto an arm table.  The patient underwent induction with general anesthesia.  Preoperative prophylactic antibiotics were administered. A tourniquet was placed about the upper arm. The operative arm was prepped and draped in a standard sterile fashion. A timeout was performed, verifying the correct patient, procedure, site and side.  An Esmarch was used to exsanguinate the limb and the tourniquet was inflated to 250 mmHg.  A curvilinear medial incision was made centered at the elbow just posterior to the medial epicondyle.  This was taken through skin and subcutaneous tissue.  Blunt dissection was then performed down to the level of the upper arm and forearm fascia.  Superficial sensory nerves were identified and protected.  The fascia in the upper arm was split longitudinally just posterior to the ulnar nerve and this release was taken proximally for 8 cm up to the arcade of [place].  The soft tissue overlying the nerve through the cubital tunnel was split along the posterior edge of the ulnar nerve.  This included a small anconeus epitrochlearis which was identified and divided. Both the superficial and deep fascia of the flexor carpi ulnaris were split in line with the ulnar nerve to 6 cm distal to the medial epicondyle.  The elbow was  brought through full range of motion and the ulnar nerve was stable through a full arc of motion.  There was no subluxation.  There was no tension or kinking of the nerve.  The wound was thoroughly irrigated with normal saline.     The wound was packed and attention was turned to the carpal tunnel release. A longitudinal incision was made in line  with the radial aspect of the ring finger from the distal wrist flexion crease to Jalloh's line.  This was taken down sharply to the level of the palmar fascia.  Hemostasis was maintained with a bipolar throughout the procedure. The palmar fascia was incised in line with the skin incision.  The transverse fibers of the transverse carpal ligament were identified and released from their ulnar attachment in both a proximal and distal direction.  Distally, the release was completed until the palmar fat was present.  Proximally, the tenotomy scissors were used to bluntly dissect above and below the transverse carpal ligament and it was released under direct visualization into the distal forearm.  Palpation and visualization confirmed complete release of the ligament.        The tourniquet was taken down, packing removed from the elbow wound, and hemostasis was achieved. Both wounds were thoroughly irrigated with normal saline.  The skin in the palm was closed with interrupted horizontal mattress 4-0 nylon sutures and daptic was applied. For the elbow, the deep tissue was closed with interrupted 3-0 Vicryl suture, and the skin was closed with a running 4-0 subcuticular Monocryl.  Skin glue was applied.  Soft sterile dressings were applied of 4x4s, gonzalez for wrist and cast padding for the elbow, and coban. The patient tolerated the procedure well and there were no immediate complications.  She was awakened and brought to the recovery room in stable condition.  All sponge and needle counts were correct at the end of the case.         POSTOPERATIVE PLAN:  The patient will discharge  to home today with oral pain medications.  She will elevate and ice.  She  will remain in the dressing for 5 days, at which point dressing changes may be begun.  The patient will follow-up in 10-14 days  for a wound check and suture removal from the carpal tunnel. The patient has been given a sling and instructed to wear it as needed.

## 2018-08-30 NOTE — IP AVS SNAPSHOT
MRN:[id number]                      After Visit Summary   8/30/2018    Nataliya Lei    MRN: [id number]           Thank you!     Thank you for choosing [facility] for your care. Our goal is always to provide you with excellent care. Hearing back from our patients is one way we can continue to improve our services. Please take a few minutes to complete the written survey that you may receive in the mail after you visit with us. Thank you!        Patient Information     Date Of Birth          1975        About your hospital stay     You were admitted on:  August 30, 2018 You last received care in the:  [facility]    You were discharged on:  August 30, 2018       Who to Call     For medical emergencies, please call 911.  For non-urgent questions about your medical care, please call your primary care provider or clinic, [phone]  For questions related to your surgery, please call your surgery clinic        Attending Provider     Provider Marcos Zamora MD Orthopedics       Primary Care Provider Office Phone # Fax #    Leti Parsons [phone]      Your next 10 appointments already scheduled     Sep 04, 2018  8:30 AM CDT   Electrocochleography with Donna Meza [facility] ABR MACHINE [address] [place] [address] Audiology ([facility])    [address] [address] [phone]           ECO: Electrocochleography  How is the test done?  The test records electrical signals made by the inner ear and auditory nerve.   First, we clean the skin before placing sticky patches (electrodes) in several places on the head and shoulders.   One more electrode is placed in the ear canal. This is a small piece of cotton, soaked in water, connected by a tiny wire.   The examiner looks through a microscope to place the electrode on the eardrum. Most patients feel a tickle or slight pressure when this is done. A  few patients feel mild discomfort.   After the cotton is in place, most patients do not feel it at all. A foam plug placed in the ear canal holds the electrode in place.   The plug also serves as an earphone for sounds presented as part of the test. The patient hears very rapid clicks that are loud but usually not uncomfortable.   The patient lies on a bed and should try to relax. Many people sleep through the test.  ABR (auditory brainstem response) test The ABR test is usually done at the same time as ECOG. This test records electrical signals made by the brain when it hears sounds. The electrodes can also record brain signals so that both tests can be done at the same time. The two tests together can help find out where your symptoms come from. The two tests usually take about 2 hours. It takes another hour to read the results and write a report. The report is sent to the referring doctor who will choose a treatment.             Sep 12, 2018 11:45 AM CDT   (Arrive by 11:30 AM)   RETURN HAND with Marcos Saucedo MD   [facility] ([facility])    [address] [place] [id number] [phone]            Oct 01, 2018 10:00 AM CDT   (Arrive by 9:45 AM)   Balance Testing with Bulmaro Moore   [facility] Audiology ([facility])    [address] [place] [id number] [phone]           You are scheduled for the following tests: VNG (Videonystagmography). You will wear goggles with small cameras. These record small eye movements as you change position. This test takes 1 to 1 1/2 hours. Rotational Chair. You will sit a chair that has a motor. The room will be dark. You will wear goggles with a camera while the chair rocks slowly from side to side. This test takes 20 to 30 minutes. CDP (Computerized Dynamic Posturography). You will stand on a platform with your eyes open or closed. It will be unsteady at  times. This will tell us how your balance systems work together and how well you sense the ground under your feet. This test takes 30 minutes.  Why am I having these tests? These are tests for your inner ear. They may help us find out why you feel dizzy or off balance.  How do I prepare? Below is a list of things that can affect test results. Do NOT take these for 48 hours before your tests or we will need to reschedule. We want to make sure your test results are correct. Ask your doctor if you have questions about stopping any medicine.  48 hours before the tests: Stop drinking alcohol (beer, wine, liquor). Do NOT take Amitriptyline. Do NOT take medicines for: Allergy or colds. Examples: Benadryl (diphenhydramine), Allegra (fexofenadine), Zyrtec (cetirizine) and any over-the-counter medicine that may cause drowsiness. Anti-anxiety, unless you have been on them every day for the past 8 weeks or more. Examples: Xanax (alprazolam), Klonopin (clonazepam), Valium (diazepam), Ativan (lorazepam). Dizziness and nausea. Examples: Antivert/Bonine/Dramamine Less-Drowsy Formula (meclizine), Dramamine (dimenhydrinate), Trans-derm Scop (Scopolamine), Compazine (prochlorperazine), Phenergan (promethazine). Sleeping. This includes sleeping pills, sedatives, tranquilizers, muscle relaxants and narcotics. It is okay to take medicines for diabetes, heart, blood pressure, seizures, thyroid or an ongoing condition.  The day of the tests:   Please have a  with you.   Do not have caffeine (coffee, soda, chocolate, energy drinks).   Do not smoke or have nicotine for at least 4 hours before the tests. This includes tobacco, e-cigarettes and nicotine gum.   Do not eat 2 to 3 hours before the tests, unless you have diabetes. Then, you should follow your usual diet.   Do not wear any make-up or lotions around your eyes or eyelids.   If you wear contact lenses, be prepared to take them out for testing; or wear your glasses.   If you take  the anti-nausea medicine Zofran (ondansetron), you may bring it with you to take after your tests.  Who should I call if I have questions? If you have any questions, please call the [facility] at [facility]: [phone]            Oct 04, 2018  4:30 PM CDT   (Arrive by 4:15 PM)   Return Visit with Jorge Be MD   [facility] ([facility])    [address] [place] [address]  [facility] [address]   [phone]            Oct 10, 2018  2:30 PM CDT   (Arrive by 2:15 PM)   RETURN HAND with Marcos Saucedo MD   [facility] ([facility])    [address] [place] [id number] [phone]            Feb 06, 2019  3:30 PM CST   (Arrive by 3:15 PM)   Return Visit with Everardo Suarez MD   [facility] ([facility])    [address] [place] [id number] [phone]              Further instructions from your care team       [facility]  Home Care Following Anesthesia  For 24 hours after surgery:  1. Get plenty of rest.  A responsible adult must stay with you for at least 24 hours after you leave the surgery center.  2. Do not drive or use heavy equipment.  If you have weakness or tingling, don't drive or use heavy equipment until this feeling goes away.   3. Do not drink alcohol.   4. Avoid strenuous or risky activities.  Ask for help when climbing stairs.  5. You may feel lightheaded.  IF so, sit for a few minutes before standing.  Have someone help you get up.   6. If you have nausea (feel sick to your stomach): Drink only clear liquids such as apple juice, ginger ale, broth or 7-Up.  Rest may also help.  Be sure to drink enough fluids.  Move to a regular diet as you feel able.   7. You may have a slight fever.  Call the doctor if your fever is over 100 F (37.7 C) (taken  under the tongue) or lasts longer than 24 hours.  8. You may have a dry mouth, a sore throat, muscle aches or trouble sleeping. These should go away after 24 hours.  9. Do not make important or legal decisions.               Tips for taking pain medications  To get the best pain relief possible, remember these points:    Take pain medications as directed, before pain becomes severe.    Pain medication can upset your stomach: taking it with food may help.    Constipation is a common side effect of pain medication. Drink plenty of  fluids.    Eat foods high in fiber. Take a stool softener if recommended by your doctor or pharmacist.    Do not drink alcohol, drive or operate machinery while taking pain medications.    Ask about other ways to control pain, such as with heat, ice or relaxation.    Tylenol/Acetaminophen Consumption  To help encourage the safe use of acetaminophen, the makers of TYLENOL  have lowered the maximum daily dose for single-ingredient Extra Strength TYLENOL  (acetaminophen) products sold in the [place] from 8 pills per day (4,000 mg) to 6 pills per day (3,000 mg). The dosing interval has also changed from 2 pills every 4-6 hours to 2 pills every 6 hours.    If you feel your pain relief is insufficient, you may take Tylenol/Acetaminophen in addition to your narcotic pain medication.     Be careful not to exceed 3,000 mg of Tylenol/Acetaminophen in a 24 hour period from all sources.    If you are taking extra strength Tylenol/acetaminophen (500 mg), the maximum dose is 6 tablets in 24 hours.    If you are taking regular strength acetaminophen (325 mg), the maximum dose is 9 tablets in 24 hours.    Call a doctor for any of the followin. Signs of infection (fever, growing tenderness at the surgery site, a large amount of drainage or bleeding, severe pain, foul-smelling drainage, redness, swelling).  2. It has been over 8 to 10 hours since surgery and you are still not able to urinate (pass  "water).  3. Headache for over 24 hours.  4. Numbness, tingling or weakness the day after surgery (if you had spinal anesthesia).  Your doctor is:       Dr. Marcos Saucedo, Orthopaedics: [phone]               Or dial [phone] and ask for the resident on call for:  Orthopaedics  For emergency care, call the:  [facility] Emergency Department: [phone] (TTY for hearing impaired: [phone])            Instructions for after your surgery    Remove your dressing after 5 days. At that point, can wash incisions with soap and water daily and then pat dry. Then cover incisions with bandaid or tegaderm and gauze. Avoid immersion of incision in water until skin is healed (3-4 weeks)     Keep your operative arm elevated as much as possible. This will help with pain and swelling.     Do not lift anything heavier than a coffee cup with your operative hand. You can use it for light activities like eating and brushing your teeth.    You will have a follow-up appointment scheduled with Dr. Saucedo or [place]. If you do not know when this appointment is, please call Dr. Saucedo's office to find out.     Call Dr. Saucedo's office if you experience any of the following:   Fevers, chills, increasing wound drainage, pain that is not controlled with the medications you have been prescribing, swelling that is not controlled with elevation, problems with your splint, or any other questions or concerns.       Pending Results     No orders found from 8/28/2018 to 8/31/2018.            Admission Information     Date & Time Provider Department Dept. Phone    8/30/2018 Marcos Saucedo MD [facility] [phone]      Your Vitals Were     Blood Pressure Temperature Height Weight Last Period Pulse Oximetry    104/51 98.1  F (36.7  C) (Oral) 1.511 m (4' 11.5\") 51.3 kg (113 lb) 07/30/2018 100%    BMI (Body Mass Index)                   22.44 kg/m2           MyChart Information     MyChart is an " electronic gateway that provides easy, online access to your medical records. With [employer], you can request a clinic appointment, read your test results, renew a prescription or communicate with your care team.     To sign up for [employer] visit the website at [email or web address]   You will be asked to enter the access code listed below, as well as some personal information. Please follow the directions to create your username and password.     Your access code is: [email or web address]  Expires: 2018  6:31 AM     Your access code will  in 90 days. If you need help or a new code, please contact your [facility] or call [phone] for assistance.        Care EveryWhere ID     This is your Care EveryWhere ID. This could be used by other organizations to access your [facility] medical records  [id number]        Equal Access to Services     DEMETRIO RICHARDSON : Destiny Verde, isabell hansen, twin longoria, tani talamantes . So [facility] [phone].    ATENCIÓN: Si habla español, tiene a gilbert disposición servicios gratuitos de asistencia lingüística. Salvador al [phone].    We comply with applicable federal civil rights laws and [place] laws. We do not discriminate on the basis of race, color, national origin, age, disability, sex, sexual orientation, or gender identity.               Review of your medicines      START taking        Dose / Directions    HYDROcodone-acetaminophen 5-325 MG per tablet   Commonly known as:  NORCO   Used for:  Post-operative state        Dose:  1 tablet   Take 1 tablet by mouth every 6 hours as needed for severe pain   Quantity:  15 tablet   Refills:  0            Where to get your medicines      Some of these will need a paper prescription and others can be bought over the counter. Ask your nurse if you have questions.     Bring a paper prescription for each of these medications      HYDROcodone-acetaminophen 5-325 MG per tablet                Protect others around you: Learn how to safely use, store and throw away your medicines at www.disposemymeds.org.        Information about OPIOIDS     PRESCRIPTION OPIOIDS: WHAT YOU NEED TO KNOW   We gave you an opioid (narcotic) pain medicine. It is important to manage your pain, but opioids are not always the best choice. You should first try all the other options your care team gave you. Take this medicine for as short a time (and as few doses) as possible.    Some activities can increase your pain, such as bandage changes or therapy sessions. It may help to take your pain medicine 30 to 60 minutes before these activities. Reduce your stress by getting enough sleep, working on hobbies you enjoy and practicing relaxation or meditation. Talk to your care team about ways to manage your pain beyond prescription opioids.    These medicines have risks:    DO NOT drive when on new or higher doses of pain medicine. These medicines can affect your alertness and reaction times, and you could be arrested for driving under the influence (DUI). If you need to use opioids long-term, talk to your care team about driving.    DO NOT operate heavy machinery    DO NOT do any other dangerous activities while taking these medicines.    DO NOT drink any alcohol while taking these medicines.     If the opioid prescribed includes acetaminophen, DO NOT take with any other medicines that contain acetaminophen. Read all labels carefully. Look for the word  acetaminophen  or  Tylenol.  Ask your pharmacist if you have questions or are unsure.    You can get addicted to pain medicines, especially if you have a history of addiction (chemical, alcohol or substance dependence). Talk to your care team about ways to reduce this risk.    All opioids tend to cause constipation. Drink plenty of water and eat foods that have a lot of fiber, such as fruits, vegetables, prune juice, apple  juice and high-fiber cereal. Take a laxative (Miralax, milk of magnesia, Colace, Senna) if you don t move your bowels at least every other day. Other side effects include upset stomach, sleepiness, dizziness, throwing up, tolerance (needing more of the medicine to have the same effect), physical dependence and slowed breathing.    Store your pills in a secure place, locked if possible. We will not replace any lost or stolen medicine. If you don t finish your medicine, please throw away (dispose) as directed by your pharmacist. The [place] Pollution Control Agency has more information about safe disposal: https://www.pca.Pending sale to [facility].mn.us/living-green/managing-unwanted-medications             Medication List: This is a list of all your medications and when to take them. Check marks below indicate your daily home schedule. Keep this list as a reference.      Medications           Morning Afternoon Evening Bedtime As Needed    HYDROcodone-acetaminophen 5-325 MG per tablet   Commonly known as:  NORCO   Take 1 tablet by mouth every 6 hours as needed for severe pain

## 2018-08-30 NOTE — ANESTHESIA POSTPROCEDURE EVALUATION
Patient: Nataliya Lei    Procedure(s):   - Wound Class: I-Clean   - Wound Class: I-Clean    Diagnosis:Right Cubital Tunnel Syndrome and Carpal Tunnel Syndrome  Diagnosis Additional Information: No value filed.    Anesthesia Type:  General, LMA    Note:  Anesthesia Post Evaluation         Comments: Patient location during evaluation: Phase 2  Patient participation: Able to fully participate in evaluation  Level of consciousness: awake  Pain management: adequate  Airway patency: patent  Cardiovascular status: acceptable  Respiratory status: acceptable  Hydration status: acceptable  PONV: none     Anesthetic complications: None        Last vitals:  Vitals:    08/30/18 1015 08/30/18 1023 08/30/18 1040   BP: 127/70 139/69 141/70   Resp: 14 14 14   Temp: 36.5  C (97.7  F) 36.4  C (97.6  F)    SpO2:  100% 100%         Electronically Signed By: Hu Villanueva MD  August 30, 2018  11:34 AM

## 2018-08-30 NOTE — DISCHARGE INSTRUCTIONS
[facility]  Home Care Following Anesthesia  For 24 hours after surgery:  1. Get plenty of rest.  A responsible adult must stay with you for at least 24 hours after you leave the surgery center.  2. Do not drive or use heavy equipment.  If you have weakness or tingling, don't drive or use heavy equipment until this feeling goes away.   3. Do not drink alcohol.   4. Avoid strenuous or risky activities.  Ask for help when climbing stairs.  5. You may feel lightheaded.  IF so, sit for a few minutes before standing.  Have someone help you get up.   6. If you have nausea (feel sick to your stomach): Drink only clear liquids such as apple juice, ginger ale, broth or 7-Up.  Rest may also help.  Be sure to drink enough fluids.  Move to a regular diet as you feel able.   7. You may have a slight fever.  Call the doctor if your fever is over 100 F (37.7 C) (taken under the tongue) or lasts longer than 24 hours.  8. You may have a dry mouth, a sore throat, muscle aches or trouble sleeping. These should go away after 24 hours.  9. Do not make important or legal decisions.               Tips for taking pain medications  To get the best pain relief possible, remember these points:    Take pain medications as directed, before pain becomes severe.    Pain medication can upset your stomach: taking it with food may help.    Constipation is a common side effect of pain medication. Drink plenty of  fluids.    Eat foods high in fiber. Take a stool softener if recommended by your doctor or pharmacist.    Do not drink alcohol, drive or operate machinery while taking pain medications.    Ask about other ways to control pain, such as with heat, ice or relaxation.    Tylenol/Acetaminophen Consumption  To help encourage the safe use of acetaminophen, the makers of TYLENOL  have lowered the maximum daily dose for single-ingredient Extra Strength TYLENOL  (acetaminophen) products sold in the [place] from 8  pills per day (4,000 mg) to 6 pills per day (3,000 mg). The dosing interval has also changed from 2 pills every 4-6 hours to 2 pills every 6 hours.    If you feel your pain relief is insufficient, you may take Tylenol/Acetaminophen in addition to your narcotic pain medication.     Be careful not to exceed 3,000 mg of Tylenol/Acetaminophen in a 24 hour period from all sources.    If you are taking extra strength Tylenol/acetaminophen (500 mg), the maximum dose is 6 tablets in 24 hours.    If you are taking regular strength acetaminophen (325 mg), the maximum dose is 9 tablets in 24 hours.    Call a doctor for any of the followin. Signs of infection (fever, growing tenderness at the surgery site, a large amount of drainage or bleeding, severe pain, foul-smelling drainage, redness, swelling).  2. It has been over 8 to 10 hours since surgery and you are still not able to urinate (pass water).  3. Headache for over 24 hours.  4. Numbness, tingling or weakness the day after surgery (if you had spinal anesthesia).  Your doctor is:       Dr. Marcos Saucedo, Orthopaedics: [phone]               Or dial [phone] and ask for the resident on call for:  Orthopaedics  For emergency care, call the:  [facility]: [phone] (TTY for hearing impaired: [phone])            Instructions for after your surgery    Remove your dressing after 5 days. At that point, can wash incisions with soap and water daily and then pat dry. Then cover incisions with bandaid or tegaderm and gauze. Avoid immersion of incision in water until skin is healed (3-4 weeks)     Keep your operative arm elevated as much as possible. This will help with pain and swelling.     Do not lift anything heavier than a coffee cup with your operative hand. You can use it for light activities like eating and brushing your teeth.    You will have a follow-up appointment scheduled with Dr. Saucedo or [place]. If you do not know when this  appointment is, please call Dr. Saucedo's office to find out.     Call Dr. Saucedo's office if you experience any of the following:   Fevers, chills, increasing wound drainage, pain that is not controlled with the medications you have been prescribing, swelling that is not controlled with elevation, problems with your splint, or any other questions or concerns.

## 2018-09-12 ENCOUNTER — RECORDS - HEALTHEAST (OUTPATIENT)
Dept: ADMINISTRATIVE | Facility: OTHER | Age: 43
End: 2018-09-12

## 2018-09-12 ENCOUNTER — TELEPHONE (OUTPATIENT)
Dept: ORTHOPEDICS | Facility: CLINIC | Age: 43
End: 2018-09-12

## 2018-09-12 ENCOUNTER — OFFICE VISIT (OUTPATIENT)
Dept: ORTHOPEDICS | Facility: CLINIC | Age: 43
End: 2018-09-12
Payer: COMMERCIAL

## 2018-09-12 VITALS — WEIGHT: 112 LBS | BODY MASS INDEX: 22.58 KG/M2 | HEIGHT: 59 IN

## 2018-09-12 DIAGNOSIS — G56.22 CUBITAL TUNNEL SYNDROME ON LEFT: ICD-10-CM

## 2018-09-12 DIAGNOSIS — G56.02 CARPAL TUNNEL SYNDROME OF LEFT WRIST: Primary | ICD-10-CM

## 2018-09-12 NOTE — LETTER
"2018       RE: Nataliya Lei  [address] [place] [address]     Dear Colleague,    Thank you for referring your patient, Nataliya Lei, to the [facility] at [facility]. Please see a copy of my visit note below.    [facility]  Orthopedics  Marcos Saucedo MD  2018     Name: Nataliya Lei  MRN: [id number]  Age: 43 year old  : 1975  Referring provider: Marcos Saucedo     Chief Complaint:   Surgical Followup (The patient is following up today after a right cubital tunnel and right carpal tunnel release. DOS: )       Date of Surgery: 18    Procedure: Right cubital tunnel in situ release, right open carpal tunnel release    History of Present Illness:   Nataliya Lei is a 43 year old female 2 weeks status-post the above mentioned procedure who presents for postoperative evaluation. Today, she reports improvement in sensation over all of her right digits. Her right wrist is still sore from surgery, but the numbness and tingling no longer wakes her up at night. Reports pain as a 3/10. Is no longer taking pain medication.     She also presents today for further evaluation of left hand numbness and tingling. While symptoms on the right have improved since surgery, N/T on the left continues to e bothersome, most noticeably now in the middle, ring, and small fingers. She would like to go ahead with surgery for this sooner rather than later due to her insurance.    Physical Examination:  Ht 1.499 m (4' 11\")  Wt 50.8 kg (112 lb)  BMI 22.62 kg/m2  Well-developed, well-nourished and in no acute distress.  Alert and oriented to surroundings.  Upon examination of the bilateral upper extremity:     Right upper extremity:  Incisions clean, dry, intact, and healing well. Sutures were removed from the hand today. Sensation intact in radial, medial, and ulnar nerve distributions. Her thumb opposition is intact. Interosseous, EPL, FPD-2 5/5 strength.    Left " upper extremity:  Skin clean, dry and intact. No thenar or hypothenar atrophy. No deformity.No muscle atrophy.   Sensation: Positive Tinel's over carpal tunnel. Positive Tinel's over cubital tunnel. Positive carpal tunnel compression. Positive elbow flexion compression. Negative wrist Phalen's. No subluxation of ulnar nerve at elbow.     Electrodiagnostic Studies:   An electrodiagnostic study of the bilateral extremities was available for review today.  This was completed on June 06, 2018 and demonstrated mild to moderate right greater than left bilateral carpal tunnel syndrome, progressed since 2012.     Assessment:   43 year old female with bilateral carpal tunnel and cubital tunnel symptoms (carpal tunnel on EMG and exam, cubital tunnel on exam only) s/p right cubital tunnel in situ release, right open carpal tunnel release. Progressing as expected.     Plan:   Patient is recovering well. I discussed that she should do no lifting > 5 lbs for the next 2 weeks, then progress activities as tolerated. No soaking wounds in water for prolonged period until skin completely healed, likely 1-2 more weeks. In terms of returning to the gym, I would recommend avoiding heavy lifting with the right upper extremity or prolonged heavy pressure to the right palm for another 2 weeks and advised her to keep the surgical incisions protected from sitting in excessive sweat until they are completely healed.     We discussed the let side. Due to insurance changes she would like to go ahead with this sooner rather than later. I again reviewed the treatment options and alternatives to surgery with the patient, as well as risks of surgery including infection, bleeding pain, scarring, damage to nerves, blood vessels, or other nearby structures, stiffness, complex regional pain syndrome, worsening or failure of symptoms to improve, need for further surgery, and risks of anesthesia including heart attack, stroke, and death. She expressed  understanding and informed consent was obtained. We did discuss the timing of surgery and says she believes she will feel comfortable depending entirely on the RUE by the end of the month so will plan for surgery then but if she does not, agrees to let me know and reschedule.       Scribe Disclosure:   I, Chris Bernard, am serving as a scribe to document services personally performed by Marcos Saucedo MD at this visit, based upon the provider's statements to me. All documentation has been reviewed by the aforementioned provider prior to being entered into the official medical record.    Marcos Saucedo MD

## 2018-09-12 NOTE — MR AVS SNAPSHOT
After Visit Summary   9/12/2018    Nataliya Lei    MRN: [id number]           Patient Information     Date Of Birth          1975        Visit Information        Provider Department      9/12/2018 11:45 AM Marcos Saucedo MD [facility]        Today's Diagnoses     Carpal tunnel syndrome of left wrist    -  1    Cubital tunnel syndrome on left           Follow-ups after your visit        Your next 10 appointments already scheduled     Sep 27, 2018   Procedure with Marcos Saucedo MD   [facility] ([facility])    [address] [place] [address]  5th [facility] [place] [id number] [phone]           Located in the Clinics and Surgery Center at [address] [place] [address].   parking is very convenient and highly recommended.  is a $6 flat rate fee.  Both  and self parkers should enter the main arrival plaza from [facility]; parking attendants will direct you based on your parking preference.            Oct 01, 2018 10:00 AM CDT   (Arrive by 9:45 AM)   Balance Testing with Bulmaro Moore   [facility] ([facility])    [address] [place] [address]  4th [facility] [place] [id number] [phone]           You are scheduled for the following tests: VNG (Videonystagmography). You will wear goggles with small cameras. These record small eye movements as you change position. This test takes 1 to 1 1/2 hours. Rotational Chair. You will sit a chair that has a motor. The room will be dark. You will wear goggles with a camera while the chair rocks slowly from side to side. This test takes 20 to 30 minutes. CDP (Computerized Dynamic Posturography). You will stand on a platform with your eyes open or closed. It will be unsteady at times. This will tell us how your balance systems work together and how well you sense the ground under your feet. This test takes 30 minutes.  Why am I having these  tests? These are tests for your inner ear. They may help us find out why you feel dizzy or off balance.  How do I prepare? Below is a list of things that can affect test results. Do NOT take these for 48 hours before your tests or we will need to reschedule. We want to make sure your test results are correct. Ask your doctor if you have questions about stopping any medicine.  48 hours before the tests: Stop drinking alcohol (beer, wine, liquor). Do NOT take Amitriptyline. Do NOT take medicines for: Allergy or colds. Examples: Benadryl (diphenhydramine), Allegra (fexofenadine), Zyrtec (cetirizine) and any over-the-counter medicine that may cause drowsiness. Anti-anxiety, unless you have been on them every day for the past 8 weeks or more. Examples: Xanax (alprazolam), Klonopin (clonazepam), Valium (diazepam), Ativan (lorazepam). Dizziness and nausea. Examples: Antivert/Bonine/Dramamine Less-Drowsy Formula (meclizine), Dramamine (dimenhydrinate), Trans-derm Scop (Scopolamine), Compazine (prochlorperazine), Phenergan (promethazine). Sleeping. This includes sleeping pills, sedatives, tranquilizers, muscle relaxants and narcotics. It is okay to take medicines for diabetes, heart, blood pressure, seizures, thyroid or an ongoing condition.  The day of the tests:   Please have a  with you.   Do not have caffeine (coffee, soda, chocolate, energy drinks).   Do not smoke or have nicotine for at least 4 hours before the tests. This includes tobacco, e-cigarettes and nicotine gum.   Do not eat 2 to 3 hours before the tests, unless you have diabetes. Then, you should follow your usual diet.   Do not wear any make-up or lotions around your eyes or eyelids.   If you wear contact lenses, be prepared to take them out for testing; or wear your glasses.   If you take the anti-nausea medicine Zofran (ondansetron), you may bring it with you to take after your tests.  Who should I call if I have questions? If you have any questions,  please call the Balance Center at [facility]: [phone]            Oct 04, 2018  4:30 PM CDT   (Arrive by 4:15 PM)   Return Visit with Jorge Be MD   [facility] ([facility])    [address] [place] [id number]   [phone]            Oct 09, 2018 11:00 AM CDT   (Arrive by 10:45 AM)   Post-Op with  U Ortho Nurse   [facility] ([facility])    [address] [place] [address]  4th [facility] [id number] [phone]            Oct 10, 2018  2:30 PM CDT   (Arrive by 2:15 PM)   RETURN HAND with Marcos Saucedo MD   [facility] ([facility])    [address] [place] [id number] [phone]            Nov 07, 2018 11:30 AM CST   (Arrive by 11:15 AM)   RETURN HAND with Marcos Saucedo MD   [facility] ([facility])    [address] [place] [id number] [phone]            Feb 06, 2019  3:30 PM CST   (Arrive by 3:15 PM)   Return Visit with Everardo Suarez MD   [facility] Neurology ([facility])    [address] [place] [id number] [phone]              Who to contact     Please call your clinic at [phone] to:    Ask questions about your health    Make or cancel appointments    Discuss your medicines    Learn about your test results    Speak to your doctor            Additional Information About Your Visit        MyChart Information     e-Tag is an electronic gateway that provides easy, online access to your medical records. With e-Tag, you can request a clinic appointment, read your test results, renew a prescription or communicate with your care team.     To sign up for Invenergyt visit the website at [email or web address]   You will be asked to enter the access  "code listed below, as well as some personal information. Please follow the directions to create your username and password.     Your access code is: [email or web address]  Expires: 2018  6:31 AM     Your access code will  in 90 days. If you need help or a new code, please contact your [facility] or call [phone] for assistance.        Care EveryWhere ID     This is your Care EveryWhere ID. This could be used by other organizations to access your [place] medical records  [id number]        Your Vitals Were     Height BMI (Body Mass Index)                1.499 m (4' 11\") 22.62 kg/m2           Blood Pressure from Last 3 Encounters:   18 141/70   18 92/54    Weight from Last 3 Encounters:   18 50.8 kg (112 lb)   18 51.3 kg (113 lb)   18 51.3 kg (113 lb)              We Performed the Following     Ca-Operative Worksheet (Hand)        Primary Care Provider Office Phone # Fax #    Leti UNDERWOOD Issa [phone]       [place] [address]        Equal Access to Services     DEMETRIO RICHARDSON : Hadii gomez ku hadasho Soomaali, waaxda luqadaha, qaybta kaalmada adeegyada, tani blairin hayjaylen talamantes . So [facility] [phone].    ATENCIÓN: Si habla español, tiene a gilbert disposición servicios gratuitos de asistencia lingüística. Llame al [phone].    We comply with applicable federal civil rights laws and [place] laws. We do not discriminate on the basis of race, color, national origin, age, disability, sex, sexual orientation, or gender identity.            Thank you!     Thank you for choosing [facility]  for your care. Our goal is always to provide you with excellent care. Hearing back from our patients is one way we can continue to improve our services. Please take a few minutes to complete the written survey that you may receive in the mail after your visit with us. Thank you!           "   Your Updated Medication List - Protect others around you: Learn how to safely use, store and throw away your medicines at www.disposemymeds.org.          This list is accurate as of 9/12/18  5:49 PM.  Always use your most recent med list.                   Brand Name Dispense Instructions for use Diagnosis    HYDROcodone-acetaminophen 5-325 MG per tablet    NORCO    15 tablet    Take 1 tablet by mouth every 6 hours as needed for severe pain    Post-operative state

## 2018-09-12 NOTE — TELEPHONE ENCOUNTER
Patient is scheduled for surgery with Dr. Saucedo      Spoke or left message with: patient    Date of Surgery: 9/27/18    Location: ASC    H&P: Scheduled with primary clinic with in 30 days of surgery.    Surgery packet: was given to patient in clinic by nurse    Additional comments: will wait for pre op nurse phone call 1-2 days prior to surgery for arrival time.

## 2018-09-12 NOTE — NURSING NOTE
Teaching Flowsheet   Relevant Diagnosis: Left carpal tunnel and cubital tunnel syndrome  Teaching Topic: Left cubital tunnel release, possible ulnar nerve transpositioni, left carpal tunnel release     Person(s) involved in teaching:   Patient     Motivation Level:  Asks Questions: Yes  Eager to Learn: Yes  Cooperative: Yes  Receptive (willing/able to accept information): Yes  Any cultural factors/Worship beliefs that may influence understanding or compliance? No     Patient demonstrates understanding of the following:  Reason for the appointment, diagnosis and treatment plan: Yes  Knowledge of proper use of medications and conditions for which they are ordered (with special attention to potential side effects or drug interactions): Yes  Which situations necessitate calling provider and whom to contact: Yes     Teaching Concerns Addressed:   Comments: patient understands she will need new H&P completed within 30 days of surgery.  Her last H&P was done 8/20/18 and this will not be in that time frame.     Nutritional needs and diet plan: Yes  Pain management techniques: Yes  Wound Care: Yes  How and/when to access community resources: Yes     Instructional Materials Used/Given: Pre-op packet given to patient and reviewed with her.  Antiseptic soap given.  Her questions were answered.  She was given a tentative date of 9/27/2018 at the [facility].  Iza will call patient to verify date depending on OR availability.  Patient has our phone number for any further questions or concerns.      Time spent with patient: 15 minutes.

## 2018-09-12 NOTE — NURSING NOTE
"Reason For Visit:   Chief Complaint   Patient presents with     Surgical Followup     The patient is following up today after a right cubital tunnel and right carpal tunnel release. DOS: 8/30       Primary MD: Leti Parsons  Ref. MD: established    ?  No    Age: 43 year old      Date of surgery: 8/30/18  Type of surgery: Right cubital tunnel and carpal tunnel release.        Ht 1.499 m (4' 11\")  Wt 50.8 kg (112 lb)  BMI 22.62 kg/m2      Pain Assessment  Patient Currently in Pain: Yes  0-10 Pain Scale: 3  Primary Pain Location: Elbow  Pain Orientation: Right  Pain Descriptors: Aching  Alleviating Factors: Rest  Aggravating Factors: Movement    Hand Dominance Evaluation  Hand Dominance: Right          QuickDASH Assessment  QuickDASH Main 9/12/2018   1.Open a tight or new jar. Moderate difficulty   2. Do heavy household chores (e.g., wash walls, floors) Severe difficulty   3. Carry a shopping bag or briefcase. Moderate difficulty   4. Wash your back. Moderate difficulty   5. Use a knife to cut food. Moderate difficulty   6. Recreational activities in which you take some force or impact through your arm, shoulder or hand (e.g., golf, hammering, tennis, etc.). Severe difficulty   7. During the past week, to what extent has your arm, shoulder or hand problem interfered with your normal social activities with family, friends, neighbours or groups? Moderately   8. During the past week, were you limited in your work or other regular daily activities as a result of your arm, shoulder or hand problem? Moderately limited   9. Arm, shoulder or hand pain. Moderate   10.Tingling (pins and needles) in your arm,shoulder or hand. Moderate   11. During the past week, how much difficulty have you had sleeping because of the pain in your arm, shoulder or hand? ([place] number) Mild difficulty   Quickdash Ability Score 52.27          Allergies   Allergen Reactions     Blood-Group Specific Substance      Anti [place] antibody " present       Kia Mejia, ATC

## 2018-09-12 NOTE — PROGRESS NOTES
"[facility]  Orthopedics  Marcos Saucedo MD  2018     Name: Nataliya Lei  MRN: [id number]  Age: 43 year old  : 1975  Referring provider: Marcos Saucedo     Chief Complaint:   Surgical Followup (The patient is following up today after a right cubital tunnel and right carpal tunnel release. DOS: )       Date of Surgery: 18    Procedure: Right cubital tunnel in situ release, right open carpal tunnel release    History of Present Illness:   Nataliya Lei is a 43 year old female 2 weeks status-post the above mentioned procedure who presents for postoperative evaluation. Today, she reports improvement in sensation over all of her right digits. Her right wrist is still sore from surgery, but the numbness and tingling no longer wakes her up at night. Reports pain as a 3/10. Is no longer taking pain medication.     She also presents today for further evaluation of left hand numbness and tingling. While symptoms on the right have improved since surgery, N/T on the left continues to e bothersome, most noticeably now in the middle, ring, and small fingers. She would like to go ahead with surgery for this sooner rather than later due to her insurance.    Physical Examination:  Ht 1.499 m (4' 11\")  Wt 50.8 kg (112 lb)  BMI 22.62 kg/m2  Well-developed, well-nourished and in no acute distress.  Alert and oriented to surroundings.  Upon examination of the bilateral upper extremity:     Right upper extremity:  Incisions clean, dry, intact, and healing well. Sutures were removed from the hand today. Sensation intact in radial, medial, and ulnar nerve distributions. Her thumb opposition is intact. Interosseous, EPL, FPD-2 5/5 strength.    Left upper extremity:  Skin clean, dry and intact. No thenar or hypothenar atrophy. No deformity.No muscle atrophy.   Sensation: Positive Tinel's over carpal tunnel. Positive Tinel's over cubital tunnel. Positive carpal tunnel compression. Positive elbow flexion compression. " Negative wrist Phalen's. Possible subluxation of ulnar nerve at elbow.     Electrodiagnostic Studies:   An electrodiagnostic study of the bilateral extremities was available for review today.  This was completed on June 06, 2018 and demonstrated mild to moderate right greater than left bilateral carpal tunnel syndrome, progressed since 2012.     Assessment:   43 year old female with bilateral carpal tunnel and cubital tunnel symptoms (carpal tunnel on EMG and exam, cubital tunnel on exam only) s/p right cubital tunnel in situ release, right open carpal tunnel release. Progressing as expected.     Plan:   Patient is recovering well. I discussed that she should do no lifting > 5 lbs for the next 2 weeks, then progress activities as tolerated. No soaking wounds in water for prolonged period until skin completely healed, likely 1-2 more weeks. In terms of returning to the gym, I would recommend avoiding heavy lifting with the right upper extremity or prolonged heavy pressure to the right palm for another 2 weeks and advised her to keep the surgical incisions protected from sitting in excessive sweat until they are completely healed.     We discussed the let side. Due to insurance changes she would like to go ahead with this sooner rather than later. I again reviewed the treatment options and alternatives to surgery with the patient, as well as risks of surgery including infection, bleeding pain, scarring, damage to nerves, blood vessels, or other nearby structures, stiffness, complex regional pain syndrome, worsening or failure of symptoms to improve, need for further surgery, and risks of anesthesia including heart attack, stroke, and death. She expressed understanding and informed consent was obtained. We did discuss the timing of surgery and says she believes she will feel comfortable depending entirely on the RUE by the end of the month so will plan for surgery then but if she does not, agrees to let me know and  reschedule.       Scribe Disclosure:   I, Chris Bernard, am serving as a scribe to document services personally performed by Marcos Saucedo MD at this visit, based upon the provider's statements to me. All documentation has been reviewed by the aforementioned provider prior to being entered into the official medical record.    Marcos Saucedo MD

## 2018-09-13 ENCOUNTER — COMMUNICATION - HEALTHEAST (OUTPATIENT)
Dept: FAMILY MEDICINE | Facility: CLINIC | Age: 43
End: 2018-09-13

## 2018-09-26 ENCOUNTER — OFFICE VISIT - HEALTHEAST (OUTPATIENT)
Dept: FAMILY MEDICINE | Facility: CLINIC | Age: 43
End: 2018-09-26

## 2018-09-26 ENCOUNTER — ANESTHESIA EVENT (OUTPATIENT)
Dept: SURGERY | Facility: AMBULATORY SURGERY CENTER | Age: 43
End: 2018-09-26

## 2018-09-26 DIAGNOSIS — G56.02 CARPAL TUNNEL SYNDROME OF LEFT WRIST: ICD-10-CM

## 2018-09-26 DIAGNOSIS — Z01.818 PRE-OP EXAM: ICD-10-CM

## 2018-09-26 DIAGNOSIS — G56.22 CUBITAL TUNNEL SYNDROME ON LEFT: ICD-10-CM

## 2018-09-26 ASSESSMENT — MIFFLIN-ST. JEOR: SCORE: 1063.66

## 2018-09-27 ENCOUNTER — ANESTHESIA (OUTPATIENT)
Dept: SURGERY | Facility: AMBULATORY SURGERY CENTER | Age: 43
End: 2018-09-27

## 2018-09-27 ENCOUNTER — HOSPITAL ENCOUNTER (OUTPATIENT)
Facility: AMBULATORY SURGERY CENTER | Age: 43
End: 2018-09-27
Attending: ORTHOPAEDIC SURGERY
Payer: COMMERCIAL

## 2018-09-27 ENCOUNTER — SURGERY (OUTPATIENT)
Age: 43
End: 2018-09-27

## 2018-09-27 VITALS
WEIGHT: 113 LBS | BODY MASS INDEX: 22.78 KG/M2 | TEMPERATURE: 98 F | RESPIRATION RATE: 16 BRPM | OXYGEN SATURATION: 98 % | SYSTOLIC BLOOD PRESSURE: 105 MMHG | DIASTOLIC BLOOD PRESSURE: 57 MMHG | HEIGHT: 59 IN | HEART RATE: 57 BPM

## 2018-09-27 DIAGNOSIS — Z98.890 POST-OPERATIVE STATE: ICD-10-CM

## 2018-09-27 LAB
HCG UR QL: NEGATIVE
INTERNAL QC OK POCT: YES

## 2018-09-27 RX ORDER — SODIUM CHLORIDE, SODIUM LACTATE, POTASSIUM CHLORIDE, CALCIUM CHLORIDE 600; 310; 30; 20 MG/100ML; MG/100ML; MG/100ML; MG/100ML
INJECTION, SOLUTION INTRAVENOUS CONTINUOUS
Status: DISCONTINUED | OUTPATIENT
Start: 2018-09-27 | End: 2018-09-27 | Stop reason: HOSPADM

## 2018-09-27 RX ORDER — ONDANSETRON 2 MG/ML
INJECTION INTRAMUSCULAR; INTRAVENOUS PRN
Status: DISCONTINUED | OUTPATIENT
Start: 2018-09-27 | End: 2018-09-27

## 2018-09-27 RX ORDER — FENTANYL CITRATE 50 UG/ML
INJECTION, SOLUTION INTRAMUSCULAR; INTRAVENOUS PRN
Status: DISCONTINUED | OUTPATIENT
Start: 2018-09-27 | End: 2018-09-27

## 2018-09-27 RX ORDER — HYDROCODONE BITARTRATE AND ACETAMINOPHEN 5; 325 MG/1; MG/1
1 TABLET ORAL EVERY 6 HOURS PRN
Qty: 15 TABLET | Refills: 0 | Status: SHIPPED | OUTPATIENT
Start: 2018-09-27

## 2018-09-27 RX ORDER — BUPIVACAINE HYDROCHLORIDE 5 MG/ML
INJECTION, SOLUTION PERINEURAL PRN
Status: DISCONTINUED | OUTPATIENT
Start: 2018-09-27 | End: 2018-09-27 | Stop reason: HOSPADM

## 2018-09-27 RX ORDER — IBUPROFEN 200 MG
600 TABLET ORAL
Status: DISCONTINUED | OUTPATIENT
Start: 2018-09-27 | End: 2018-09-28 | Stop reason: HOSPADM

## 2018-09-27 RX ORDER — LIDOCAINE HYDROCHLORIDE 20 MG/ML
INJECTION, SOLUTION INFILTRATION; PERINEURAL PRN
Status: DISCONTINUED | OUTPATIENT
Start: 2018-09-27 | End: 2018-09-27

## 2018-09-27 RX ORDER — PROPOFOL 10 MG/ML
INJECTION, EMULSION INTRAVENOUS PRN
Status: DISCONTINUED | OUTPATIENT
Start: 2018-09-27 | End: 2018-09-27

## 2018-09-27 RX ORDER — DEXAMETHASONE SODIUM PHOSPHATE 4 MG/ML
INJECTION, SOLUTION INTRA-ARTICULAR; INTRALESIONAL; INTRAMUSCULAR; INTRAVENOUS; SOFT TISSUE PRN
Status: DISCONTINUED | OUTPATIENT
Start: 2018-09-27 | End: 2018-09-27

## 2018-09-27 RX ORDER — LIDOCAINE 40 MG/G
CREAM TOPICAL
Status: DISCONTINUED | OUTPATIENT
Start: 2018-09-27 | End: 2018-09-27 | Stop reason: HOSPADM

## 2018-09-27 RX ORDER — PROPOFOL 10 MG/ML
INJECTION, EMULSION INTRAVENOUS CONTINUOUS PRN
Status: DISCONTINUED | OUTPATIENT
Start: 2018-09-27 | End: 2018-09-27

## 2018-09-27 RX ADMIN — SODIUM CHLORIDE, SODIUM LACTATE, POTASSIUM CHLORIDE, CALCIUM CHLORIDE: 600; 310; 30; 20 INJECTION, SOLUTION INTRAVENOUS at 08:13

## 2018-09-27 RX ADMIN — DEXAMETHASONE SODIUM PHOSPHATE 4 MG: 4 INJECTION, SOLUTION INTRA-ARTICULAR; INTRALESIONAL; INTRAMUSCULAR; INTRAVENOUS; SOFT TISSUE at 09:04

## 2018-09-27 RX ADMIN — FENTANYL CITRATE 25 MCG: 50 INJECTION, SOLUTION INTRAMUSCULAR; INTRAVENOUS at 09:18

## 2018-09-27 RX ADMIN — PROPOFOL 175 MCG/KG/MIN: 10 INJECTION, EMULSION INTRAVENOUS at 09:50

## 2018-09-27 RX ADMIN — PROPOFOL 150 MCG/KG/MIN: 10 INJECTION, EMULSION INTRAVENOUS at 08:55

## 2018-09-27 RX ADMIN — ONDANSETRON 4 MG: 2 INJECTION INTRAMUSCULAR; INTRAVENOUS at 09:04

## 2018-09-27 RX ADMIN — BUPIVACAINE HYDROCHLORIDE 5 ML: 5 INJECTION, SOLUTION PERINEURAL at 10:24

## 2018-09-27 RX ADMIN — FENTANYL CITRATE 50 MCG: 50 INJECTION, SOLUTION INTRAMUSCULAR; INTRAVENOUS at 09:16

## 2018-09-27 RX ADMIN — FENTANYL CITRATE 25 MCG: 50 INJECTION, SOLUTION INTRAMUSCULAR; INTRAVENOUS at 09:55

## 2018-09-27 RX ADMIN — LIDOCAINE HYDROCHLORIDE 80 MG: 20 INJECTION, SOLUTION INFILTRATION; PERINEURAL at 08:58

## 2018-09-27 RX ADMIN — PROPOFOL 200 MG: 10 INJECTION, EMULSION INTRAVENOUS at 08:58

## 2018-09-27 NOTE — ANESTHESIA CARE TRANSFER NOTE
Patient: Nataliya Lei    Procedure(s):  Left Cubital Tunnel Release, Possible Ulnar Nerve Transposition, Left Carpal Tunnel Release - Wound Class: I-Clean   - Wound Class: I-Clean    Diagnosis: Left Cubital Tunnel Syndrome and Left Carpal Tunnel Syndrome  Diagnosis Additional Information: No value filed.    Anesthesia Type:   No value filed.     Note:  Airway :Room Air  Patient transferred to:Phase II  Handoff Report: Identifed the Patient, Identified the Reponsible Provider, Reviewed the pertinent medical history, Discussed the surgical course, Reviewed Intra-OP anesthesia mangement and issues during anesthesia, Set expectations for post-procedure period and Allowed opportunity for questions and acknowledgement of understanding      Vitals: (Last set prior to Anesthesia Care Transfer)    CRNA VITALS  9/27/2018 0959 - 9/27/2018 1034      9/27/2018             Pulse: 60    SpO2: 99 %    Resp Rate (set): 10                Electronically Signed By: KO Servin CRNA  September 27, 2018  10:34 AM

## 2018-09-27 NOTE — IP AVS SNAPSHOT
MRN:[id number]                      After Visit Summary   9/27/2018    Nataliya Lei    MRN: [id number]           Thank you!     Thank you for choosing [facility] for your care. Our goal is always to provide you with excellent care. Hearing back from our patients is one way we can continue to improve our services. Please take a few minutes to complete the written survey that you may receive in the mail after you visit with us. Thank you!        Patient Information     Date Of Birth          1975        About your hospital stay     You were admitted on:  September 27, 2018 You last received care in the:  [facility]    You were discharged on:  September 27, 2018       Who to Call     For medical emergencies, please call 911.  For non-urgent questions about your medical care, please call your primary care provider or clinic, [phone]  For questions related to your surgery, please call your surgery clinic        Attending Provider     Provider Specialty    Marcos Saucedo MD Orthopedics       Primary Care Provider Office Phone # Fax #    Leti Parsons [phone]      Your next 10 appointments already scheduled     Oct 09, 2018 11:00 AM CDT   (Arrive by 10:45 AM)   Post-Op with  U Ortho Nurse   [facility] ([facility] [place])    [address] [place] [id number] [phone]            Oct 10, 2018  2:30 PM CDT   (Arrive by 2:15 PM)   RETURN HAND with Marcos Saucedo MD   [facility] ([facility] [place])    [address] [place] [id number] [phone]            Nov 07, 2018 11:30 AM CST   (Arrive by 11:15 AM)   RETURN HAND with Marcos Saucedo MD   [facility] ([facility] [place])    [address] [place] [id number]   [phone]            Feb 06, 2019  3:30 PM CST    (Arrive by 3:15 PM)   Return Visit with Everardo Suarez MD   [facility] ([facility])    [address] [facility] [address]  [address] [facility] [address] [phone]              Further instructions from your care team       Instructions for after your surgery     Leave your dressing on and keep it dry. Do not remove it or get it wet.      Keep your operative arm elevated as much as possible. This will help with pain and swelling.      Do not use your operative arm for any lifting, pushing, pulling, weight bearing, or other activities.      Wear your sling. Take it off and gently range your elbow 5-10 times 3-4 times a day so they do not get stiff.     You will have a follow-up appointment scheduled with Dr. Saucedo or [place]. If you do not know when this appointment is, please call Dr. Saucedo's office to find out.      Call Dr. Saucedo's office if you experience any of the following:   Fevers, chills, increasing wound drainage, pain that is not controlled with the medications you have been prescribing, swelling that is not controlled with elevation, problems with your splint, abnormal numbness or tingling, or any other questions or concerns.      [facility]  Home Care Following Anesthesia  For 24 hours after surgery:  1. Get plenty of rest.  A responsible adult must stay with you for at least 24 hours after you leave the surgery center.  2. Do not drive or use heavy equipment.  If you have weakness or tingling, don't drive or use heavy equipment until this feeling goes away.   3. Do not drink alcohol.   4. Avoid strenuous or risky activities.  Ask for help when climbing stairs.  5. You may feel lightheaded.  IF so, sit for a few minutes before standing.  Have someone help you get up.   6. If you have nausea (feel sick to your stomach): Drink only clear liquids such as apple juice, ginger ale, broth or 7-Up.  Rest may also help.  Be sure  to drink enough fluids.  Move to a regular diet as you feel able.   7. You may have a slight fever.  Call the doctor if your fever is over 100 F (37.7 C) (taken under the tongue) or lasts longer than 24 hours.  8. You may have a dry mouth, a sore throat, muscle aches or trouble sleeping. These should go away after 24 hours.  9. Do not make important or legal decisions.               Tips for taking pain medications  To get the best pain relief possible, remember these points:    Take pain medications as directed, before pain becomes severe.    Pain medication can upset your stomach: taking it with food may help.    Constipation is a common side effect of pain medication. Drink plenty of  fluids.    Eat foods high in fiber. Take a stool softener if recommended by your doctor or pharmacist.    Do not drink alcohol, drive or operate machinery while taking pain medications.    Ask about other ways to control pain, such as with heat, ice or relaxation.    Tylenol/Acetaminophen Consumption  To help encourage the safe use of acetaminophen, the makers of TYLENOL  have lowered the maximum daily dose for single-ingredient Extra Strength TYLENOL  (acetaminophen) products sold in the [place] from 8 pills per day (4,000 mg) to 6 pills per day (3,000 mg). The dosing interval has also changed from 2 pills every 4-6 hours to 2 pills every 6 hours.    If you feel your pain relief is insufficient, you may take Tylenol/Acetaminophen in addition to your narcotic pain medication.     Be careful not to exceed 3,000 mg of Tylenol/Acetaminophen in a 24 hour period from all sources.    If you are taking extra strength Tylenol/acetaminophen (500 mg), the maximum dose is 6 tablets in 24 hours.    If you are taking regular strength acetaminophen (325 mg), the maximum dose is 9 tablets in 24 hours.    Call a doctor for any of the followin. Signs of infection (fever, growing tenderness at the surgery site, a large amount of drainage or  "bleeding, severe pain, foul-smelling drainage, redness, swelling).  2. It has been over 8 to 10 hours since surgery and you are still not able to urinate (pass water).  3. Headache for over 24 hours.  4. Numbness, tingling or weakness the day after surgery (if you had spinal anesthesia).  Your doctor is:       Dr. Marcos Saucedo, Orthopaedics: [phone]               Or dial [phone] and ask for the resident on call for:  Orthopaedics  For emergency care, call the:  [facility]: [phone] (TTY for hearing impaired: [phone])                Pending Results     No orders found from 2018 to 2018.            Admission Information     Date & Time Provider Department Dept. Phone    2018 Marcos Saucedo MD [facility] [phone]      Your Vitals Were     Blood Pressure Pulse Temperature Respirations Height Weight    99/56 64 97.3  F (36.3  C) (Temporal) 16 1.499 m (4' 11\") 51.3 kg (113 lb)    Pulse Oximetry BMI (Body Mass Index)                98% 22.82 kg/m2          MyChart Information     [employer] is an electronic gateway that provides easy, online access to your medical records. With [employer], you can request a clinic appointment, read your test results, renew a prescription or communicate with your care team.     To sign up for [employer] visit the website at [email or web address]   You will be asked to enter the access code listed below, as well as some personal information. Please follow the directions to create your username and password.     Your access code is: [email or web address]  Expires: 2018 10:12 AM     Your access code will  in 90 days. If you need help or a new code, please contact your [facility] or call [phone] for assistance.        Care EveryWhere ID     This is your Care EveryWhere ID. This could be used by other organizations to access your [facility] medical " records  [id number]        Equal Access to Services     DEMETRIO RICHARDSON : Hadii aad ku hadkgtito Verde, waaxda lunathenadaha, qaybta yolismagita longoria, tani parrish. So [facility] [phone].    ATENCIÓN: Si habla español, tiene a gilbert disposición servicios gratuitos de asistencia lingüística. Llame al [phone].    We comply with applicable federal civil rights laws and [place] laws. We do not discriminate on the basis of race, color, national origin, age, disability, sex, sexual orientation, or gender identity.               Review of your medicines      UNREVIEWED medicines. Ask your doctor about these medicines        Dose / Directions    HYDROcodone-acetaminophen 5-325 MG per tablet   Commonly known as:  NORCO   Used for:  Post-operative state        Dose:  1 tablet   Take 1 tablet by mouth every 6 hours as needed for severe pain   Quantity:  15 tablet   Refills:  0                Protect others around you: Learn how to safely use, store and throw away your medicines at www.disposemymeds.org.             Medication List: This is a list of all your medications and when to take them. Check marks below indicate your daily home schedule. Keep this list as a reference.      Medications           Morning Afternoon Evening Bedtime As Needed    HYDROcodone-acetaminophen 5-325 MG per tablet   Commonly known as:  NORCO   Take 1 tablet by mouth every 6 hours as needed for severe pain

## 2018-09-27 NOTE — ANESTHESIA POSTPROCEDURE EVALUATION
Patient: Nataliya Lei    Procedure(s):  Left Cubital Tunnel Release, Possible Ulnar Nerve Transposition, Left Carpal Tunnel Release - Wound Class: I-Clean   - Wound Class: I-Clean    Diagnosis:Left Cubital Tunnel Syndrome and Left Carpal Tunnel Syndrome  Diagnosis Additional Information: No value filed.    Anesthesia Type:  No value filed.    Note:  Anesthesia Post Evaluation    Patient location during evaluation: bedside  Patient participation: Able to fully participate in evaluation  Level of consciousness: awake  Pain management: adequate  Airway patency: patent  Cardiovascular status: acceptable  Respiratory status: acceptable  Hydration status: acceptable  PONV: controlled     Anesthetic complications: None          Last vitals:  Vitals:    09/27/18 1033 09/27/18 1040 09/27/18 1103   BP: 99/56 108/52 105/57   Pulse: 64 71 57   Resp: 16 16 16   Temp: 36.3  C (97.3  F) 36.1  C (97  F) 36.7  C (98  F)   SpO2: 98% 99% 98%         Electronically Signed By: Fred Elizabeth MD, MD  September 27, 2018  12:14 PM

## 2018-09-27 NOTE — IP AVS SNAPSHOT
[facility]    [address] [place] [address]    Phone:  [phone]    Fax:  [phone]                                       After Visit Summary   9/27/2018    Nataliya Lei    MRN: [id number]           After Visit Summary Signature Page     I have received my discharge instructions, and my questions have been answered. I have discussed any challenges I see with this plan with the nurse or doctor.    ..........................................................................................................................................  Patient/Patient Representative Signature      ..........................................................................................................................................  Patient Representative Print Name and Relationship to Patient    ..................................................               ................................................  Date                                   Time    ..........................................................................................................................................  Reviewed by Signature/Title    ...................................................              ..............................................  Date                                               Time          22EPIC Rev 08/18

## 2018-09-27 NOTE — BRIEF OP NOTE
[facility]    Orthopaedic Surgery  Brief Operative Note    Pre-operative diagnosis: Left Cubital Tunnel Syndrome and Left Carpal Tunnel Syndrome   Post-operative diagnosis Same    Procedure: Procedure(s):  Left Cubital Tunnel Release, Possible Ulnar Nerve Transposition, Left Carpal Tunnel Release - Wound Class: I-Clean   - Wound Class: I-Clean   Surgeon: MD Lewis    Assistants(s): MD Gianna PGY-4    Anesthesia: General    Estimated blood loss: Minimal   Total IV fluids: (See anesthesia record)   Blood transfusion: (See anesthesia record)   Total urine output: (See anesthesia record)   Drains: None   Specimens: * No specimens in log *   Findings: None   Complications: None   Implants: None    Tourniquet 43 min     See discharge instructions     Shirley Katz MD   PGY-4 Orthopaedic Surgery

## 2018-09-27 NOTE — ANESTHESIA PREPROCEDURE EVALUATION
Anesthesia Evaluation     . Pt has had prior anesthetic. Type: General    No history of anesthetic complications          ROS/MED HX    ENT/Pulmonary:  - neg pulmonary ROS     Neurologic:  - neg neurologic ROS     Cardiovascular:  - neg cardiovascular ROS       METS/Exercise Tolerance:  4 - Raking leaves, gardening   Hematologic:  - neg hematologic  ROS       Musculoskeletal:  - neg musculoskeletal ROS       GI/Hepatic:  - neg GI/hepatic ROS       Renal/Genitourinary:  - ROS Renal section negative       Endo:  - neg endo ROS       Psychiatric:  - neg psychiatric ROS       Infectious Disease:  - neg infectious disease ROS       Malignancy:      - no malignancy   Other:                     Physical Exam  Normal systems: dental    Airway   Mallampati: I  TM distance: >3 FB  Neck ROM: full    Dental     Cardiovascular   Rhythm and rate: regular and normal      Pulmonary    breath sounds clear to auscultation                    Anesthesia Plan      History & Physical Review  History and physical reviewed and following examination; no interval change.    ASA Status:  1 .    NPO Status:  > 6 hours    Plan for General and LMA with Intravenous induction. Maintenance will be TIVA.    PONV prophylaxis:  Ondansetron (or other 5HT-3) and Dexamethasone or Solumedrol       Postoperative Care  Postoperative pain management:  Oral pain medications and Multi-modal analgesia.      Consents  Anesthetic plan, risks, benefits and alternatives discussed with:  Patient..                          .

## 2018-09-27 NOTE — DISCHARGE INSTRUCTIONS
Instructions for after your surgery     Leave your dressing on and keep it dry. Do not remove it or get it wet.      Keep your operative arm elevated as much as possible. This will help with pain and swelling.      Do not use your operative arm for any lifting, pushing, pulling, weight bearing, or other activities.      Wear your sling. Take it off and gently range your elbow 5-10 times 3-4 times a day so they do not get stiff.     You will have a follow-up appointment scheduled with Dr. Saucedo or [facility]. If you do not know when this appointment is, please call Dr. Saucedo's office to find out.      Call Dr. Saucedo's office if you experience any of the following:   Fevers, chills, increasing wound drainage, pain that is not controlled with the medications you have been prescribing, swelling that is not controlled with elevation, problems with your splint, abnormal numbness or tingling, or any other questions or concerns.      [facility]  Home Care Following Anesthesia  For 24 hours after surgery:  1. Get plenty of rest.  A responsible adult must stay with you for at least 24 hours after you leave the surgery center.  2. Do not drive or use heavy equipment.  If you have weakness or tingling, don't drive or use heavy equipment until this feeling goes away.   3. Do not drink alcohol.   4. Avoid strenuous or risky activities.  Ask for help when climbing stairs.  5. You may feel lightheaded.  IF so, sit for a few minutes before standing.  Have someone help you get up.   6. If you have nausea (feel sick to your stomach): Drink only clear liquids such as apple juice, ginger ale, broth or 7-Up.  Rest may also help.  Be sure to drink enough fluids.  Move to a regular diet as you feel able.   7. You may have a slight fever.  Call the doctor if your fever is over 100 F (37.7 C) (taken under the tongue) or lasts longer than 24 hours.  8. You may have a dry mouth, a sore throat, muscle  aches or trouble sleeping. These should go away after 24 hours.  9. Do not make important or legal decisions.               Tips for taking pain medications  To get the best pain relief possible, remember these points:    Take pain medications as directed, before pain becomes severe.    Pain medication can upset your stomach: taking it with food may help.    Constipation is a common side effect of pain medication. Drink plenty of  fluids.    Eat foods high in fiber. Take a stool softener if recommended by your doctor or pharmacist.    Do not drink alcohol, drive or operate machinery while taking pain medications.    Ask about other ways to control pain, such as with heat, ice or relaxation.    Tylenol/Acetaminophen Consumption  To help encourage the safe use of acetaminophen, the makers of TYLENOL  have lowered the maximum daily dose for single-ingredient Extra Strength TYLENOL  (acetaminophen) products sold in the [place] from 8 pills per day (4,000 mg) to 6 pills per day (3,000 mg). The dosing interval has also changed from 2 pills every 4-6 hours to 2 pills every 6 hours.    If you feel your pain relief is insufficient, you may take Tylenol/Acetaminophen in addition to your narcotic pain medication.     Be careful not to exceed 3,000 mg of Tylenol/Acetaminophen in a 24 hour period from all sources.    If you are taking extra strength Tylenol/acetaminophen (500 mg), the maximum dose is 6 tablets in 24 hours.    If you are taking regular strength acetaminophen (325 mg), the maximum dose is 9 tablets in 24 hours.    Call a doctor for any of the followin. Signs of infection (fever, growing tenderness at the surgery site, a large amount of drainage or bleeding, severe pain, foul-smelling drainage, redness, swelling).  2. It has been over 8 to 10 hours since surgery and you are still not able to urinate (pass water).  3. Headache for over 24 hours.  4. Numbness, tingling or weakness the day after surgery (if you had  spinal anesthesia).  Your doctor is:       Dr. Marcos Saucedo, Orthopaedics: [phone]               Or dial [phone] and ask for the resident on call for:  Orthopaedics  For emergency care, call the:  [facility] Emergency Department: [phone] (TTY for hearing impaired: [phone])

## 2018-09-27 NOTE — OP NOTE
DATE OF SERVICE: September 27, 2018      PREOPERATIVE DIAGNOSIS: Left cubital tunnel syndrome, left carpal tunnel syndrome      POSTOPERATIVE DIAGNOSIS: Left  Cubital tunnel syndrome, left carpal tunnel syndrome      PROCEDURE: Left cubital tunnel release and ulnar nerve transposition with fascial sling, left open carpal tunnel release      ATTENDING SURGEON:  Marcos Saucedo MD       ASSISTANT: Shirley Katz, PGY4; Breanna Sawyer, MS4      ANESTHESIA: General plus local consisting of 10 mL of 0.5% Marcaine plain and 1% lidocaine plain injected in a 1:1 ratio, with 5 mL at carpal tunnel and 5 mL at cubital tunnel     ESTIMATED BLOOD LOSS:  Minimal      TOURNIQUET TIME:  43 minutes      FINDINGS: Small anconeus epitrochlearis. Subluxation of ulnar nerve over medial epicondyle. Mild thickening of transverse carpal ligament. Median nerve mildly hyperemic.        SPECIMENS:  None.       DRAINS:  None.       IMPLANTS:  None.       COMPLICATIONS:  None apparent.       BRIEF HISTORY:  The patient is a 43 year old female who presented with numbness and tingling in all five fingers on the left hand, most notable in middle, ring and small fingers. She also reported occasional shooting medial sided elbow pain. Physical exam was consistent with carpal and cubital tunnel syndrome. 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 Electrodiagnostic study did not show evidence of cubital tunnel syndrome.  Symptoms were refractory to non-operative management.  I had a discussion with the patient regarding treatment options that would include continued symptomatic management versus surgical intervention in the form of ulnar nerve decompression with possible anterior subcutaneous transposition and open carpal tunnel release.  I have described the procedure, postoperative protocol and expected outcomes.  I also described the risks, which include bleeding, infection, nerve or vessel damage,  wound healing problems, persistent numbness, persistent pain, and the possibility for further surgery.  Anesthetic risks are rare but include breathing problems, heart problems, stroke, and death.  After full discussion of risks, benefits and alternatives to surgery, the patient has elected to proceed and informed consent was obtained. Of note, she underwent right carpal tunnel and  Cubital tunnel release about a month ago. She reports that this resolved her preoperative symptoms and she is very satisfied with the results of surgery. She feels she has full use of the right hand and is prepared to go ahead with surgery on the left side today.         DESCRIPTION OF PROCEDURE:  The patient was identified in the preoperative holding area.  The consent was reviewed and the operative sites were identified and marked.  The history and physical was reviewed. The patient was brought to the operating room and transferred to the operating table in a supine position. The arm was placed onto a hand table. General anesthesia was induced. The operative arm was prepped and draped in a standard sterile fashion.  A timeout was performed, verifying the correct patient, procedure, site and side.  Preoperative prophylactic antibiotics were administered. A sterile tourniquet was placed about the upper arm. An Esmarch was used to exsanguinate the limb and the tourniquet was inflated to 250 mmHg.  A curvilinear medial incision was made centered at the elbow just posterior to the medial epicondyle.  This was taken through skin and subcutaneous tissue.  Blunt dissection was then performed down to the level of the upper arm and forearm fascia.  Superficial sensory nerves were identified and protected.  The fascia in the upper arm was split longitudinally just posterior to the ulnar nerve and this release was taken proximally for 8 cm up to the arcade of [place].  Distally, the soft tissue overlying the nerve through the cubital tunnel was  also split along the posterior edge of the ulnar nerve.  There was a small anconeus epitrochlearis which was divided. Both the superficial and deep fascia of the flexor carpi ulnaris were split in line with the ulnar nerve to 6 cm distal to the medial epicondyle. The elbow was taken through range of motion and there was jose rafael subluxation of the nerve over the medial epicondyle so the decision was made to proceed with transposition. The nerve release was completed circumferentially from its proximal extent to the level of the first motor branch distally and a vessel loop was placed around it. The insertion of the medial intermuscular septum on the medial epicondyle was exposed and 2 cm of septum was excised, taking care to maintain hemostasis throughout. Blunt dissection then proceeded anterior to the medial epicondyle to expose the flexor pronator fascia. The flexor pronator fascia was then incised to create a 2 cm x 2 cm proximally based fascial flap that was then elevated. The nerve was then transposed anterior to the fascial flap and two horizontal mattress sutures using 2-0 ethibond were used to secure the fascial flap to the overlying subcutaneous tissues. The nerve was then inspected with flexion and extension and there was no kinking or tension. It was able to glide freely within the fascial flap. A freer was used to confirm that there was no stenosis along the course of the nerve anterior to the flap.     The wound was packed and attention was turned to the carpal tunnel release. A longitudinal incision was made in line  with the radial aspect of the ring finger from the distal wrist flexion crease to Jalloh's line.  This was taken down sharply to the level of the palmar fascia.  Hemostasis was maintained with a bipolar throughout the procedure. The palmar fascia was incised in line with the skin incision.  The transverse fibers of the transverse carpal ligament were identified and released from their ulnar  attachment in both a proximal and distal direction.  Distally, the release was completed until the palmar fat was present.  Proximally, the tenotomy scissors were used to bluntly dissect above and below the transverse carpal ligament and it was released under direct visualization into the distal forearm.  Palpation and visualization confirmed complete release of the ligament.        The tourniquet was taken down, packing removed from the elbow wound, and hemostasis was achieved. Both wounds were thoroughly irrigated with normal saline.  The skin in the palm was closed with interrupted horizontal mattress 4-0 nylon sutures and adaptic was applied. For the elbow, the deep tissue was closed with interrupted 3-0 Vicryl suture, and the skin was closed with a running 4-0 subcuticular Monocryl.  Steri-Strips were applied.  Soft sterile dressings were applied of 4x4s, ABD pads, sterile cast padding, and coban. The patient tolerated the procedure well and there were no immediate complications.  She was awakened and brought to the recovery room in stable condition.  All sponge and needle counts were correct at the end of the case.         POSTOPERATIVE PLAN:  The patient will discharge to home today with oral pain medications.  She will elevate and ice.  She  will remain in the dressing until follow-up.  The patient will follow-up in 10-14 days  for a wound check. The patient has been given a sling and instructed to wear it. The patient should take the elbow out of the sling at least three times a day to do gentle range of motion.

## 2018-10-10 ENCOUNTER — OFFICE VISIT (OUTPATIENT)
Dept: ORTHOPEDICS | Facility: CLINIC | Age: 43
End: 2018-10-10

## 2018-10-10 ENCOUNTER — RECORDS - HEALTHEAST (OUTPATIENT)
Dept: ADMINISTRATIVE | Facility: OTHER | Age: 43
End: 2018-10-10

## 2018-10-10 DIAGNOSIS — G56.01 CARPAL TUNNEL SYNDROME OF RIGHT WRIST: ICD-10-CM

## 2018-10-10 DIAGNOSIS — G56.21 CUBITAL TUNNEL SYNDROME ON RIGHT: ICD-10-CM

## 2018-10-10 DIAGNOSIS — G56.02 CARPAL TUNNEL SYNDROME OF LEFT WRIST: Primary | ICD-10-CM

## 2018-10-10 DIAGNOSIS — G56.22 CUBITAL TUNNEL SYNDROME ON LEFT: ICD-10-CM

## 2018-10-10 NOTE — PROGRESS NOTES
[facility]  Orthopedics  Marcos Saucedo MD  10/10/2018     Name: Nataliya Lei  MRN: [id number]  Age: 43 year old  : 1975  Referring provider: Marcos Saucedo     Chief Complaint:   Postop followup    Date of Surgery: 18,  18    Procedure Performed: Right cubital tunnel and carpal tunnel release, then left carpal tunnel release and ulnar nerve transposition    History of Present Illness:   Nataliya Lei comes to see me in follow-up today. Numbness and tingling present preoperatively is improved on the left, nearly resolved on the right. No fevers or chills. No longer taking pain medication. Notes recovery on left was harder than recovery on right pain wise but this is better now. No other issues to report. She has been only intermittently wearing her sling.     Physical Examination:    Well-developed, well-nourished and in no acute distress.  Alert and oriented to surroundings.  On examination of the right upper extremity, surgical incisions are well healed. On examination of the left upper extremity, surgical incisions are healing and well-approximated with no erythema or drainage. Bilaterally, sensation is intact in median, radial and ulnar nerve distributions. Thumb opposition is intact. Patient can actively flex and extend all digits and thumb. Interosseous muscles, EPL, and FDP-2 fire. Fingers are warm and well-perfused.    Assessment:   43 year old female progressing appropriately following the above procedures.     Plan:   Left: Sutures will be removed today on the left wrist and Steri-Strips applied. Patient should continue to wash wound with soap and water daily and pat dry. Steri-Strips will fall off on their own. No lifting greater than 5 pounds for the next 2 weeks. No immersing the wound in water for prolonged periods such as tub baths or dishwashing without gloves on for 2 weeks.      Right: Activities as tolerated.     I will see the patient back in follow-up in 4 weeks.     Marcos COLLADO  MD Lewis        Scribe Disclosure:   I, Chris Wagner, am serving as a scribe to document services personally performed by Marcos Saucedo MD at this visit, based upon the provider's statements to me. All documentation has been reviewed by the aforementioned provider prior to being entered into the official medical record.     Portions of this medical record were completed by a scribe. UPON MY REVIEW AND AUTHENTICATION BY ELECTRONIC SIGNATURE, this confirms (a) I performed the applicable clinical services, and (b) the record is accurate.

## 2018-10-10 NOTE — LETTER
10/10/2018       RE: Nataliya Lei  [address] [place] [address]     Dear Colleague,    Thank you for referring your patient, Nataliya Lei, to the [facility] at [facility]. Please see a copy of my visit note below.    [facility]  Orthopedics  Marcos Saucedo MD  10/10/2018     Name: Nataliya Lei  MRN: [id number]  Age: 43 year old  : 1975  Referring provider: Marcos Saucedo     Chief Complaint:   Postop followup    Date of Surgery: 18,  18    Procedure Performed: Right cubital tunnel and carpal tunnel release, then left carpal tunnel release and ulnar nerve transposition    History of Present Illness:   Nataliya Lei comes to see me in follow-up today. Numbness and tingling present preoperatively is improved on the left, nearly resolved on the right. No fevers or chills. No longer taking pain medication. Notes recovery on left was harder than recovery on right pain wise but this is better now. No other issues to report. She has been only intermittently wearing her sling.     Physical Examination:    Well-developed, well-nourished and in no acute distress.  Alert and oriented to surroundings.  On examination of the right upper extremity, surgical incisions are well healed. On examination of the left upper extremity, surgical incisions are healing and well-approximated with no erythema or drainage. Bilaterally, sensation is intact in median, radial and ulnar nerve distributions. Thumb opposition is intact. Patient can actively flex and extend all digits and thumb. Interosseous muscles, EPL, and FDP-2 fire. Fingers are warm and well-perfused.    Assessment:   43 year old female progressing appropriately following the above procedures.     Plan:   Left: Sutures will be removed today on the left wrist and Steri-Strips applied. Patient should continue to wash wound with soap and water daily and pat dry. Steri-Strips will fall off on their own. No  lifting greater than 5 pounds for the next 2 weeks. No immersing the wound in water for prolonged periods such as tub baths or dishwashing without gloves on for 2 weeks.      Right: Activities as tolerated.     I will see the patient back in follow-up in 4 weeks.         Scribe Disclosure:   I, Chris Wagner, am serving as a scribe to document services personally performed by Marcos Saucedo MD at this visit, based upon the provider's statements to me. All documentation has been reviewed by the aforementioned provider prior to being entered into the official medical record.       Portions of this medical record were completed by a scribe. UPON MY REVIEW AND AUTHENTICATION BY ELECTRONIC SIGNATURE, this confirms (a) I performed the applicable clinical services, and (b) the record is accurate.      Marcos Saucedo MD

## 2018-10-10 NOTE — MR AVS SNAPSHOT
After Visit Summary   10/10/2018    Nataliya Lei    MRN: [id number]           Patient Information     Date Of Birth          1975        Visit Information        Provider Department      10/10/2018 2:30 PM Marcos Saucedo MD [facility]        Today's Diagnoses     Carpal tunnel syndrome of left wrist    -  1    Carpal tunnel syndrome of right wrist        Cubital tunnel syndrome on left        Cubital tunnel syndrome on right           Follow-ups after your visit        Your next 10 appointments already scheduled     2018 10:30 AM CST   (Arrive by 10:15 AM)   RETURN HAND with Marcos Saucedo MD   [facility] ([facility] [place])    [address] [facility] [id number] [phone]            2019  3:30 PM CST   (Arrive by 3:15 PM)   Return Visit with Everardo Suarez MD   [facility] ([facility])    [address]  [address] [facility] [id number] [phone]              Who to contact     Please call your clinic at [phone] to:    Ask questions about your health    Make or cancel appointments    Discuss your medicines    Learn about your test results    Speak to your doctor            Additional Information About Your Visit        MyChart Information     [email or web address] is an electronic gateway that provides easy, online access to your medical records. With [employer], you can request a clinic appointment, read your test results, renew a prescription or communicate with your care team.     To sign up for UXP[employer] visit the website at [email or web address]   You will be asked to enter the access code listed below, as well as some personal information. Please follow the directions to create your username and password.     Your access code is: [email or web address]  Expires: 2018 10:12 AM     Your access code will  in 90 days. If you need help or a new code,  please contact your [facility] or call [phone] for assistance.        Care EveryWhere ID     This is your Care EveryWhere ID. This could be used by other organizations to access your [place] medical records  [id number]         Blood Pressure from Last 3 Encounters:   09/27/18 105/57   08/30/18 141/70   08/01/18 92/54    Weight from Last 3 Encounters:   09/27/18 51.3 kg (113 lb)   09/12/18 50.8 kg (112 lb)   08/30/18 51.3 kg (113 lb)              Today, you had the following     No orders found for display       Primary Care Provider Office Phone # Fax #    Leti Parsons [phone]       [address] [address]        Equal Access to Services     DEMETRIO RICHARDSON : Hadii gomez ku hadasho Soomaali, waaxda luqadaha, qaybta kaalmada adeegyada, tani talamantes . So [facility] [phone].    ATENCIÓN: Si habla español, tiene a gilbert disposición servicios gratuitos de asistencia lingüística. Salvador al [phone].    We comply with applicable federal civil rights laws and [place] laws. We do not discriminate on the basis of race, color, national origin, age, disability, sex, sexual orientation, or gender identity.            Thank you!     Thank you for choosing [facility]  for your care. Our goal is always to provide you with excellent care. Hearing back from our patients is one way we can continue to improve our services. Please take a few minutes to complete the written survey that you may receive in the mail after your visit with us. Thank you!             Your Updated Medication List - Protect others around you: Learn how to safely use, store and throw away your medicines at www.disposemymeds.org.          This list is accurate as of 10/10/18  9:12 PM.  Always use your most recent med list.                   Brand Name Dispense Instructions for use Diagnosis    HYDROcodone-acetaminophen 5-325 MG per  tablet    NORCO    15 tablet    Take 1 tablet by mouth every 6 hours as needed for severe pain    Post-operative state

## 2018-10-10 NOTE — NURSING NOTE
Reason For Visit:   Chief Complaint   Patient presents with     Surgical Followup     s/p Left cubital tunnel release, ulnar nerve transposition with fascial sling, left open carpal tunnel release   Left side done 9/27/2018  Right side done 8/30/2018    Primary MD: Leti Parsons    ? No  Age: 43 year old    Date of surgery: 9/27/2018  Type of surgery: left cubital tunnel release, ulnar nerve transposition with fascial sling, left open carpal tunnel release   Smoker: No  Request smoking cessation information: N/a      There were no vitals taken for this visit.                      QuickDASH Assessment  QuickDASH Main 9/12/2018   1.Open a tight or new jar. Moderate difficulty   2. Do heavy household chores (e.g., wash walls, floors) Severe difficulty   3. Carry a shopping bag or briefcase. Moderate difficulty   4. Wash your back. Moderate difficulty   5. Use a knife to cut food. Moderate difficulty   6. Recreational activities in which you take some force or impact through your arm, shoulder or hand (e.g., golf, hammering, tennis, etc.). Severe difficulty   7. During the past week, to what extent has your arm, shoulder or hand problem interfered with your normal social activities with family, friends, neighbours or groups? Moderately   8. During the past week, were you limited in your work or other regular daily activities as a result of your arm, shoulder or hand problem? Moderately limited   9. Arm, shoulder or hand pain. Moderate   10.Tingling (pins and needles) in your arm,shoulder or hand. Moderate   11. During the past week, how much difficulty have you had sleeping because of the pain in your arm, shoulder or hand? ([place] number) Mild difficulty   Quickdash Ability Score 52.27          Allergies   Allergen Reactions     Blood-Group Specific Substance      Anti [place] antibody present       Katerine Messina RN

## 2018-12-05 ENCOUNTER — TELEPHONE (OUTPATIENT)
Dept: ORTHOPEDICS | Facility: CLINIC | Age: 43
End: 2018-12-05

## 2018-12-05 NOTE — TELEPHONE ENCOUNTER
Health Call Center    Phone Message    May a detailed message be left on voicemail: yes    Reason for Call: Other: pt calling to make a different appt because she can't make the appt today and she can't come in until midday. There is only one appt next week and it doesn't fit the time she needs. Please call pt to discuss further.     Action Taken: Message routed to:  [facility]opedics  Rescheduled 12/10/18 at 1:00pm.

## 2019-05-13 ENCOUNTER — COMMUNICATION - HEALTHEAST (OUTPATIENT)
Dept: FAMILY MEDICINE | Facility: CLINIC | Age: 44
End: 2019-05-13

## 2019-07-09 ENCOUNTER — RECORDS - HEALTHEAST (OUTPATIENT)
Dept: ADMINISTRATIVE | Facility: OTHER | Age: 44
End: 2019-07-09

## 2019-07-11 ENCOUNTER — OFFICE VISIT - HEALTHEAST (OUTPATIENT)
Dept: FAMILY MEDICINE | Facility: CLINIC | Age: 44
End: 2019-07-11

## 2019-07-11 ENCOUNTER — AMBULATORY - HEALTHEAST (OUTPATIENT)
Dept: FAMILY MEDICINE | Facility: CLINIC | Age: 44
End: 2019-07-11

## 2019-07-11 DIAGNOSIS — N60.12 FIBROCYSTIC BREAST CHANGES OF BOTH BREASTS: ICD-10-CM

## 2019-07-11 DIAGNOSIS — N60.11 FIBROCYSTIC BREAST CHANGES OF BOTH BREASTS: ICD-10-CM

## 2019-07-11 DIAGNOSIS — Z01.818 PRE-OP EXAM: ICD-10-CM

## 2019-07-11 DIAGNOSIS — Z13.1 SCREENING FOR DIABETES MELLITUS: ICD-10-CM

## 2019-07-11 DIAGNOSIS — D64.9 ANEMIA, UNSPECIFIED TYPE: ICD-10-CM

## 2019-07-11 DIAGNOSIS — Z12.4 SCREENING FOR CERVICAL CANCER: ICD-10-CM

## 2019-07-11 DIAGNOSIS — Z13.220 SCREENING FOR LIPID DISORDERS: ICD-10-CM

## 2019-07-11 LAB
ANION GAP SERPL CALCULATED.3IONS-SCNC: 9 MMOL/L (ref 5–18)
BUN SERPL-MCNC: 14 MG/DL (ref 8–22)
CALCIUM SERPL-MCNC: 9 MG/DL (ref 8.5–10.5)
CHLORIDE BLD-SCNC: 109 MMOL/L (ref 98–107)
CHOLEST SERPL-MCNC: 196 MG/DL
CO2 SERPL-SCNC: 21 MMOL/L (ref 22–31)
CREAT SERPL-MCNC: 0.56 MG/DL (ref 0.6–1.1)
FASTING STATUS PATIENT QL REPORTED: YES
GFR SERPL CREATININE-BSD FRML MDRD: >60 ML/MIN/1.73M2
GLUCOSE BLD-MCNC: 96 MG/DL (ref 70–125)
HCG UR QL: NEGATIVE
HDLC SERPL-MCNC: 61 MG/DL
HGB BLD-MCNC: 8 G/DL (ref 12–16)
LDLC SERPL CALC-MCNC: 116 MG/DL
POTASSIUM BLD-SCNC: 4 MMOL/L (ref 3.5–5)
SODIUM SERPL-SCNC: 139 MMOL/L (ref 136–145)
TRIGL SERPL-MCNC: 96 MG/DL

## 2019-07-11 ASSESSMENT — MIFFLIN-ST. JEOR: SCORE: 1127.16

## 2019-07-12 ENCOUNTER — AMBULATORY - HEALTHEAST (OUTPATIENT)
Dept: FAMILY MEDICINE | Facility: CLINIC | Age: 44
End: 2019-07-12

## 2019-07-12 ENCOUNTER — COMMUNICATION - HEALTHEAST (OUTPATIENT)
Dept: FAMILY MEDICINE | Facility: CLINIC | Age: 44
End: 2019-07-12

## 2019-07-12 DIAGNOSIS — D64.9 ANEMIA, UNSPECIFIED TYPE: ICD-10-CM

## 2019-07-12 LAB
HPV SOURCE: NORMAL
HUMAN PAPILLOMA VIRUS 16 DNA: NEGATIVE
HUMAN PAPILLOMA VIRUS 18 DNA: NEGATIVE
HUMAN PAPILLOMA VIRUS FINAL DIAGNOSIS: NORMAL
HUMAN PAPILLOMA VIRUS OTHER HR: NEGATIVE
SPECIMEN DESCRIPTION: NORMAL

## 2019-07-15 ENCOUNTER — COMMUNICATION - HEALTHEAST (OUTPATIENT)
Dept: FAMILY MEDICINE | Facility: CLINIC | Age: 44
End: 2019-07-15

## 2019-07-15 ENCOUNTER — AMBULATORY - HEALTHEAST (OUTPATIENT)
Dept: LAB | Facility: CLINIC | Age: 44
End: 2019-07-15

## 2019-07-15 DIAGNOSIS — D64.9 ANEMIA, UNSPECIFIED TYPE: ICD-10-CM

## 2019-07-15 LAB
FERRITIN SERPL-MCNC: 2 NG/ML (ref 10–130)
IRON SATN MFR SERPL: 13 % (ref 20–50)
IRON SERPL-MCNC: 73 UG/DL (ref 42–175)
TIBC SERPL-MCNC: 550 UG/DL (ref 313–563)
TRANSFERRIN SERPL-MCNC: 440 MG/DL (ref 212–360)

## 2019-07-17 LAB
BKR LAB AP ABNORMAL BLEEDING: NO
BKR LAB AP BIRTH CONTROL/HORMONES: NORMAL
BKR LAB AP CERVICAL APPEARANCE: NORMAL
BKR LAB AP GYN ADEQUACY: NORMAL
BKR LAB AP GYN INTERPRETATION: NORMAL
BKR LAB AP HPV REFLEX: NORMAL
BKR LAB AP LMP: NORMAL
BKR LAB AP PATIENT STATUS: NORMAL
BKR LAB AP PREVIOUS ABNORMAL: NORMAL
BKR LAB AP PREVIOUS NORMAL: 2013
HIGH RISK?: NO
PATH REPORT.COMMENTS IMP SPEC: NORMAL
RESULT FLAG (HE HISTORICAL CONVERSION): NORMAL

## 2019-07-18 ENCOUNTER — COMMUNICATION - HEALTHEAST (OUTPATIENT)
Dept: FAMILY MEDICINE | Facility: CLINIC | Age: 44
End: 2019-07-18

## 2019-07-18 LAB
HEMOGLOBIN A2 QUANTITATION: 2.6 % (ref 2.2–3.5)
HEMOGLOBIN ELECTROPHRESIS: NORMAL
HEMOGLOBIN F QUANTITATION: <0.8 % (ref 0–2)
PATH ICD:: NORMAL
REVIEWING PATHOLOGIST: NORMAL

## 2019-07-22 ENCOUNTER — RECORDS - HEALTHEAST (OUTPATIENT)
Dept: ADMINISTRATIVE | Facility: OTHER | Age: 44
End: 2019-07-22

## 2019-07-22 ENCOUNTER — COMMUNICATION - HEALTHEAST (OUTPATIENT)
Dept: FAMILY MEDICINE | Facility: CLINIC | Age: 44
End: 2019-07-22

## 2019-07-26 ENCOUNTER — COMMUNICATION - HEALTHEAST (OUTPATIENT)
Dept: FAMILY MEDICINE | Facility: CLINIC | Age: 44
End: 2019-07-26

## 2019-07-26 ENCOUNTER — RECORDS - HEALTHEAST (OUTPATIENT)
Dept: ADMINISTRATIVE | Facility: OTHER | Age: 44
End: 2019-07-26

## 2019-07-26 ENCOUNTER — ANESTHESIA - HEALTHEAST (OUTPATIENT)
Dept: SURGERY | Facility: AMBULATORY SURGERY CENTER | Age: 44
End: 2019-07-26

## 2019-08-05 ENCOUNTER — RECORDS - HEALTHEAST (OUTPATIENT)
Dept: ADMINISTRATIVE | Facility: OTHER | Age: 44
End: 2019-08-05

## 2019-10-10 ENCOUNTER — AMBULATORY - HEALTHEAST (OUTPATIENT)
Dept: FAMILY MEDICINE | Facility: CLINIC | Age: 44
End: 2019-10-10

## 2019-10-10 DIAGNOSIS — D64.9 ANEMIA, UNSPECIFIED TYPE: ICD-10-CM

## 2019-10-11 ENCOUNTER — AMBULATORY - HEALTHEAST (OUTPATIENT)
Dept: LAB | Facility: CLINIC | Age: 44
End: 2019-10-11

## 2019-10-11 ENCOUNTER — COMMUNICATION - HEALTHEAST (OUTPATIENT)
Dept: FAMILY MEDICINE | Facility: CLINIC | Age: 44
End: 2019-10-11

## 2019-10-11 DIAGNOSIS — D64.9 ANEMIA, UNSPECIFIED TYPE: ICD-10-CM

## 2019-10-11 LAB
BASOPHILS # BLD AUTO: 0.1 THOU/UL (ref 0–0.2)
BASOPHILS NFR BLD AUTO: 1 % (ref 0–2)
EOSINOPHIL # BLD AUTO: 0.2 THOU/UL (ref 0–0.4)
EOSINOPHIL NFR BLD AUTO: 3 % (ref 0–6)
ERYTHROCYTE [DISTWIDTH] IN BLOOD BY AUTOMATED COUNT: 15.1 % (ref 11–14.5)
FERRITIN SERPL-MCNC: 23 NG/ML (ref 10–130)
HCT VFR BLD AUTO: 43 % (ref 35–47)
HGB BLD-MCNC: 14.8 G/DL (ref 12–16)
IRON SATN MFR SERPL: 49 % (ref 20–50)
IRON SERPL-MCNC: 198 UG/DL (ref 42–175)
LYMPHOCYTES # BLD AUTO: 2.1 THOU/UL (ref 0.8–4.4)
LYMPHOCYTES NFR BLD AUTO: 30 % (ref 20–40)
MCH RBC QN AUTO: 30.7 PG (ref 27–34)
MCHC RBC AUTO-ENTMCNC: 34.4 G/DL (ref 32–36)
MCV RBC AUTO: 89 FL (ref 80–100)
MONOCYTES # BLD AUTO: 0.5 THOU/UL (ref 0–0.9)
MONOCYTES NFR BLD AUTO: 8 % (ref 2–10)
NEUTROPHILS # BLD AUTO: 4.2 THOU/UL (ref 2–7.7)
NEUTROPHILS NFR BLD AUTO: 59 % (ref 50–70)
PLATELET # BLD AUTO: 164 THOU/UL (ref 140–440)
PMV BLD AUTO: 8.6 FL (ref 7–10)
RBC # BLD AUTO: 4.82 MILL/UL (ref 3.8–5.4)
TIBC SERPL-MCNC: 401 UG/DL (ref 313–563)
TRANSFERRIN SERPL-MCNC: 321 MG/DL (ref 212–360)
WBC: 7.1 THOU/UL (ref 4–11)

## 2021-05-30 VITALS — HEIGHT: 59 IN | WEIGHT: 163 LBS | BODY MASS INDEX: 32.92 KG/M2 | BODY MASS INDEX: 33.25 KG/M2

## 2021-05-30 VITALS — HEIGHT: 59 IN | BODY MASS INDEX: 32.86 KG/M2 | WEIGHT: 163 LBS

## 2021-05-30 NOTE — TELEPHONE ENCOUNTER
Called pt and Left Vm to call back so can give pt covering provider's explanation below.  Per result notes from labs - PCP said to RTC in 4 months to have labs re-cechecked.  Covering provider said in previous message pt does not need repeat labs today and apt was canceled.  Please relay this to pt as well as PCP is out of clinic until Monday. Thanks.

## 2021-05-30 NOTE — TELEPHONE ENCOUNTER
Pt hbg was 8.0 at pre op apt.  Care connection scheduled for lab apt.  Need order for repeat hbg. Please. Thanks.

## 2021-05-30 NOTE — TELEPHONE ENCOUNTER
The thalassemia test revealed pt has this trait.  Is this what can be relayed to pt and what does that mean?  Or does this need to wait for PCP return on Monday?  Does pt need to be concerned?  Thanks.

## 2021-05-30 NOTE — TELEPHONE ENCOUNTER
PCP said to recheck in 4 months.  Apt canceled.  See other phone message about explaination of lab results related to hemoglobin.  Thanks.

## 2021-05-30 NOTE — TELEPHONE ENCOUNTER
Patient Returning Call  Reason for call:  Patient called back.  Information relayed to patient:  Informed of Jared Parsons's message listed below.  Patient states understanding and agrees with plan.  Transferred patient to scheduling.  Patient has additional questions:  No  If YES, what are your questions/concerns:    Okay to leave a detailed message?: No call back needed

## 2021-05-30 NOTE — TELEPHONE ENCOUNTER
Who is calling:  Nataliya Lei   Reason for Call:  Patient rescheduled lab appt from 7/16 to today @ 2:45 pm.   Date of last appointment with primary care: n/a  Okay to leave a detailed message: Yes

## 2021-05-30 NOTE — TELEPHONE ENCOUNTER
Name of form/paperwork: Other:  Pre op  Have you been seen for this request: Yes:  7/1  Do we have the form: Yes- in chart  When is form needed by: ASAP  How would you like the form returned: Fax  Fax Number: Dr Sloan  [facility]   [phone]    Patient Notified form requests are processed in 3-5 business days: No  (If patient needs form sooner, please note that in this message.)  Okay to leave a detailed message? Yes

## 2021-05-30 NOTE — TELEPHONE ENCOUNTER
Test Results  Who is calling?:  The patient  Who ordered the test:  Leti Parsons MD  Type of test: Lab  Date of test:  7/15/2019  Where was the test performed:  [facility]  What are your questions/concerns?:  The patient would like the lab test results. She would like to know if a reason was identified why her hemoglobin was so low.  Okay to leave a detailed message?:  Yes

## 2021-05-30 NOTE — TELEPHONE ENCOUNTER
The patient is looking to have a repeat hemoglobin lab draw done today 7/26/19 at 4:15pm for her upcoming surgery on 7/29/19.

## 2021-05-30 NOTE — TELEPHONE ENCOUNTER
Her labs showed that she does have alpha thalassemia trait, a genetic condition she was born with, that causes mildly low hemoglobin. This is not something to be concerned about. She also does have iron deficiency, so she should continue taking her oral iron supplement as Dr. Parsons ordered.    Georgina Tomas MD

## 2021-05-30 NOTE — TELEPHONE ENCOUNTER
"Per Dr. Parsons preop note/lab results, recheck hemoglobin in 4 months    \"Notes recorded by Leti Parsons MD on 7/19/2019 at 6:11 PM CDT  On iron supplementation . Recheck in 4 months.\"    No need to recheck today  "
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
## 2021-05-30 NOTE — TELEPHONE ENCOUNTER
----- Message from Leti Parsons MD sent at 7/12/2019  8:33 AM CDT -----  I have put in some labs.  Please have her come and draw these.  Thank you  ----- Message -----  From: Gopal Lemus CMA  Sent: 7/11/2019   2:15 PM  To: Leti Parsons MD    Patient notified per MD note below. Patient will  ferrous sulfate supplement and take once daily. Patient has not had iron studies completed but agrees to come in for testing.

## 2021-05-31 ENCOUNTER — RECORDS - HEALTHEAST (OUTPATIENT)
Dept: ADMINISTRATIVE | Facility: CLINIC | Age: 46
End: 2021-05-31

## 2021-05-31 VITALS — WEIGHT: 124 LBS | HEIGHT: 59 IN | BODY MASS INDEX: 25 KG/M2

## 2021-05-31 VITALS — BODY MASS INDEX: 28.12 KG/M2 | HEIGHT: 59 IN | WEIGHT: 139.5 LBS

## 2021-05-31 VITALS — HEIGHT: 59 IN | WEIGHT: 146.04 LBS | BODY MASS INDEX: 29.44 KG/M2

## 2021-06-01 VITALS — WEIGHT: 114 LBS | HEIGHT: 59 IN | BODY MASS INDEX: 22.98 KG/M2

## 2021-06-01 VITALS — WEIGHT: 113 LBS | BODY MASS INDEX: 22.78 KG/M2 | HEIGHT: 59 IN

## 2021-06-02 VITALS — HEIGHT: 59 IN | WEIGHT: 112 LBS | BODY MASS INDEX: 22.58 KG/M2

## 2021-06-03 VITALS
BODY MASS INDEX: 25.45 KG/M2 | HEIGHT: 59 IN | BODY MASS INDEX: 25.45 KG/M2 | WEIGHT: 126 LBS | WEIGHT: 126 LBS | BODY MASS INDEX: 25.45 KG/M2 | HEIGHT: 59 IN | BODY MASS INDEX: 25.45 KG/M2

## 2021-06-03 VITALS — WEIGHT: 126 LBS | HEIGHT: 59 IN | BODY MASS INDEX: 25.4 KG/M2

## 2021-06-05 ENCOUNTER — RECORDS - HEALTHEAST (OUTPATIENT)
Dept: ULTRASOUND IMAGING | Facility: CLINIC | Age: 46
End: 2021-06-05

## 2021-06-05 DIAGNOSIS — R74.02 ELEVATION OF LEVEL OF TRANSAMINASE AND LACTIC ACID DEHYDROGENASE (LDH): ICD-10-CM

## 2021-06-05 DIAGNOSIS — R74.01 ELEVATION OF LEVEL OF TRANSAMINASE AND LACTIC ACID DEHYDROGENASE (LDH): ICD-10-CM

## 2021-06-08 NOTE — ANESTHESIA CARE TRANSFER NOTE
Last vitals:   Vitals:    01/05/17 1658   BP: 141/65   Pulse: 64   Resp: 12   Temp: 36.4  C (97.5  F)   SpO2: 100%     Patient's level of consciousness is awake  Spontaneous respirations: yes  Maintains airway independently: yes  Dentition unchanged: yes  Oropharynx: oropharynx clear of all foreign objects    QCDR Measures:  ASA# 20 - Surgical Safety Checklist: ASA20A - Safety Checks Done  PQRS# 430 - Adult PONV Prevention: 4558F - Pt received => 2 anti-emetic agents (different classes) preop & intraop  ASA# 8 - Peds PONV Prevention: NA - Not pediatric patient, not GA or 2 or more risk factors NOT present  PQRS# 424 - Ca-op Temp Management: 4559F - At least one body temp DOCUMENTED => 35.5C or 95.9F within required timeframe  PQRS# 426 - PACU Transfer Protocol: - Transfer of care checklist used  ASA# 14 - Acute Post-op Pain: ASA14B - Patient did NOT experience pain >= 7 out of 10    I completed my SBAR handoff to the receiving nurse per policy and procedure.

## 2021-06-08 NOTE — ANESTHESIA POSTPROCEDURE EVALUATION
Patient: Nataliya Lei  REPEAT  SECTION    Anesthesia type: spinal    Patient location: PACU  Last vitals:   Vitals:    17 1750   BP: 140/66   Pulse: 60   Resp: 14   Temp:    SpO2: 100%     Post vital signs: stable  Level of consciousness: awake and responds to simple questions  Post-anesthesia pain: pain controlled  Post-anesthesia nausea and vomiting: no  Pulmonary: unassisted, return to baseline  Cardiovascular: stable and blood pressure at baseline  Hydration: adequate  Anesthetic events: no    QCDR Measures:  ASA# 11 - Ca-op Cardiac Arrest: ASA11A - Patient experienced unanticipated cardiac arrest  ASA# 12 - Ca-op Mortality Rate: ASA12B - Patient did NOT die  ASA# 13 - PACU Re-Intubation Rate: ASA13B - Patient did NOT require a new airway mgmt  ASA# 10 - Composite Anes Safety: ASA10A - No serious adverse event  ASA# 38 - New Corneal Injury: ASA38A - No new exposure keratitis or corneal abrasion in PACU    Additional Notes:

## 2021-06-08 NOTE — ANESTHESIA PREPROCEDURE EVALUATION
Anesthesia Evaluation      No history of anesthetic complications     Airway   Mallampati: II  Neck ROM: full   Pulmonary - negative ROS and normal exam                          Cardiovascular - negative ROS and normal exam   Neuro/Psych    (+) neuromuscular disease,      Endo/Other - negative ROS      GI/Hepatic/Renal - negative ROS      Other findings: Anti Lesa anti-bodies  Blood available  Piriformis syndrome      Dental - normal exam                        Anesthesia Plan  Planned anesthetic: spinal  SAB, Bicitra, GA prn  ASA 2     Anesthetic plan and risks discussed with: patient and spouse    Post-op plan: routine recovery

## 2021-06-11 NOTE — PROGRESS NOTES
"  Chief Complaint   Patient presents with     Abdominal Pain     x2 months lower left side         HPI:   Nataliya Lei is a 42 y.o. female c/o left sided abdominal pain for the last couple of months.  She has also been having constipation for that period of time.  She has been using dulcolax 2-3 times a week for about a month.  Does have a stool with it.  No blood in stools.  No fever.  No weight loss.    Also c/o low back pain since delivery of her child by c- section 1/17.  She has been seeing a chiropractor for it.  No radiation or numbness    ROS:  A 12 point comprehensive review of systems was negative except as noted.     Medications:  Current Outpatient Prescriptions on File Prior to Visit   Medication Sig Dispense Refill     multivitamin with minerals (THERA-M) 9 mg iron-400 mcg Tab tablet Take 1 tablet by mouth daily.       prenatal multivit-Ca-min-Fe-FA (PRENATAL VITAMIN) Tab Take 1 tablet by mouth daily. 90 each 3     No current facility-administered medications on file prior to visit.          Social History:  Social History   Substance Use Topics     Smoking status: Never Smoker     Smokeless tobacco: Never Used     Alcohol use No         Physical Exam:   Vitals:    06/19/17 1902   BP: 118/78   Patient Site: Left Arm   Patient Position: Sitting   Cuff Size: Adult Regular   Pulse: 80   Temp: 98.4  F (36.9  C)   TempSrc: Oral   SpO2: 98%   Weight: 146 lb 0.6 oz (66.2 kg)   Height: 4' 11\" (1.499 m)       GENERAL:   Alert. Oriented.  EYES: Clear  HENT:  Ears: R TM pearly gray. Normal landmarks. L TM pearly gray.  Normal landmarks  Nose: Clear.  Sinuses: Nontender.  Oropharynx:  No erythema. No exudate.  NECK: Supple. No adenopathy.  LUNGS: Clear to ascultation.  No crackles.  No wheezing  HEART: RRR  SKIN:  No rash.   BACK: nontender        Assessment/Plan:    1. Constipation     2. Backache        At least 6 glasses of water daily.  > 6 servings of fruits/vegetables daily.  May use prunes or prune " juice.  Whole grains.    Take 10 mg bisacodyl (Dulcolax).  Drink one 8 oz bottle of Magnesium Citrate.  Mix one capful of miralax in 8 ounces of water and drink daily.  Continue for four weeks.  Mix one teaspoon of metamucil in 8 ounces of water and drink daily.    Ice back several times daily.  Given back exercises.        The following portions of the patient's history were reviewed and updated as appropriate: allergies, current medications, past family history, past medical history, past social history, past surgical history and problem list.    Mehrdad Paige MD      6/19/2017

## 2021-06-13 NOTE — PROGRESS NOTES
"ASSESSMENT AND PLAN:  1. Otitis media patient been having pain for approximately 1 week's time pain is getting more severe causing him to feel nauseated and now referred to her right auricle on the right side of her job.  She had 1 child throat infection.  She has not had a fever but has been taking an anti-inflammatory multiple times during the day.  Augmentin prescribed for the patient hydrocodone for breakthrough pain only side effects of medications discussed in detail        CHIEF COMPLAINT:  Chief Complaint   Patient presents with     Ear Pain     right ear pain with headache, nausea      Abdominal Pain       HISTORY OF PRESENT ILLNESS:  Nataliya is a 42 y.o. female presenting with ear pain. For the past week, she has been experiencing right ear pain with associated headache and nausea. She notes her ear pain radiates up to her head and down her neck. She has not noticed any discharge from her ear. The pain kept her awake last night. She has been taking ibuprofen 400 mg as needed for pain. She denies a history of frequent ear infections. She notes an ill contact of her son.     REVIEW OF SYSTEMS:   She denies vomiting.    All other 10 point review of systems are negative.    PFSH:  Reviewed as below.     TOBACCO USE:  History   Smoking Status     Never Smoker   Smokeless Tobacco     Never Used       VITALS:  Vitals:    09/14/17 1527   BP: 112/76   Patient Site: Left Arm   Patient Position: Sitting   Cuff Size: Adult Regular   Pulse: 80   Resp: 16   Temp: 97.6  F (36.4  C)   TempSrc: Oral   Weight: 139 lb 8 oz (63.3 kg)   Height: 4' 11\" (1.499 m)     Wt Readings from Last 3 Encounters:   09/14/17 139 lb 8 oz (63.3 kg)   06/19/17 146 lb 0.6 oz (66.2 kg)   01/05/17 163 lb (73.9 kg)     Body mass index is 28.18 kg/(m^2).    PHYSICAL EXAM:  General: Alert, cooperative, no distress, appears stated age  Head: Normocephalic, without obvious abnormality, atraumatic  Ears:Right TM is bulging, fluid noted.  Canal is " inflamed  Throat: Lips, mucosa, and tongue normal; teeth and gums normal  Back: Symmetric, no curvature, ROM normal, no CVA tenderness  Lungs: Clear to auscultation bilaterally, respirations unlabored  Chest wall: No tenderness or deformity  Heart: Regular rate and rhythm, S1 and S2 normal, no murmur, rub, or gallop  Neurologic:  A & O x 3.  No tremor, no focal findings.      DATA REVIEWED:  Additional History from Old Records Summarized (2): None  Decision to Obtain Records (1): None  Radiology Tests Summarized or Ordered (1): None  Labs Reviewed or Ordered (1): None  Medicine Test Summarized or Ordered (1): None  Independent Review of EKG or X-RAY(2 each): None    The visit lasted a total of 11 minutes face to face with the patient. Over 50% of the time was spent counseling and educating the patient about ear pain.     ISavannah, am scribing for and in the presence of, Dr. Disla.    I,jean carlos disla, personally performed the services described in this documentation, as scribed by Savannah Saavedra in my presence, and it is both accurate and complete.      MEDICATIONS:  Current Outpatient Prescriptions   Medication Sig Dispense Refill     multivitamin with minerals (THERA-M) 9 mg iron-400 mcg Tab tablet Take 1 tablet by mouth daily.       No current facility-administered medications for this visit.        Total Data Points: 0

## 2021-06-14 NOTE — PROGRESS NOTES
"S:  43 yo female who is here with   1.  bilateral ear pain, right greater than left.  It is accompanied by some hearing loss only with pain.  This pain comes 3-4 days per week.  When the pain is not present, she can hear well out of her left ear, but still not out of her right.  She denies any fevers.  She has occasional tinnitus but this is not persistent.  She denies any other neurologic symptoms.  Her dizziness comes and goes.  She has not had any falls.  She says that when she is dizzy it feels like the room is spinning and then it stops.  This is not made worse by lying down or turning her head.  She has not tried any medications other than the antibiotic that was given to her 2 months ago.    2.  She has an area inside her left cheek that she would like looked at today.  This was evaluated by her dentist and she was told that she needs a area removed and biopsied.  This was not covered by her insurance at the dentist however this area is getting larger and continues to cause her significant pain.  It often bleeds when she bites it.  Because it protrudes from the side of her mouth she bites it quite often.  She says that this is been here for many years but only recently has started to enlarge.  She has no other oral lesions.    O:  /64  Pulse 60  Temp 97.9  F (36.6  C) (Oral)   Resp 20  Ht 4' 11\" (1.499 m)  Wt 124 lb (56.2 kg)  LMP 11/12/2017  SpO2 98%  BMI 25.04 kg/m2  Gen:  Nad, alert  With placement of a tuning fork on her head she does not localizes sound to just one side.  She is able to hear equally on both sides.  Bilateral TMs are clear pearly gray with good light reflex.  Bilateral external canals are normal.  External ears appear normal.  There is no tenderness with palpation over the bones either anterior posterior to the ear.  This there are no skin changes.  She does have full range of motion in her neck.  Palpation of her trigger points in her neck reproduces her " headache.  Extraocular movements are intact.  No nystagmus is noted.  Pupils are equal and reactive to light.  Palate elevates normally.  Oropharynx demonstrates a hard lesion over the left anterior cheek.  There are some areas of erosion over this.  No other oral lesions are noted.      After the risks and benefits were reviewed including risk of bleeding, infection the patient consented to have this removed.  Verbal consent was obtained.  The area was numbed with lidocaine with epinephrine.  It was removed with a scalpel.  Hemostasis was achieved with use of silver nitrate.  Patient tolerated the procedure well.  There were no complications.  The specimen was placed and a pathology specimen container.  She was instructed in aftercare to use regular warm salt water gargles and to use over-the-counter medications for pain.  She will be contacted with the results of this biopsy.    Patient Active Problem List   Diagnosis     Piriformis Syndrome     Carpal tunnel syndrome     Non-ABO Incompatibility Reaction From Transfusion     Current Outpatient Prescriptions on File Prior to Visit   Medication Sig Dispense Refill     HYDROcodone-acetaminophen 5-325 mg per tablet Take 1 tablet by mouth every 4 (four) hours as needed for pain. 8 tablet 0     multivitamin with minerals (THERA-M) 9 mg iron-400 mcg Tab tablet Take 1 tablet by mouth daily.       No current facility-administered medications on file prior to visit.           No results found for this or any previous visit (from the past 48 hour(s)).      Assessment/Plan:  1. Headache  I have advised her to continue with daily massages with tennis ball and stretching that were given to her today.  If her headaches continue or worsen or if her tinnitus continues or worsens then she should let me know and I will refer her to ENT.  - loratadine (CLARITIN) 10 mg tablet; Take 1 tablet (10 mg total) by mouth daily.  Dispense: 30 tablet; Refill: 11    2. Clicking tinnitus of both  ears      3. Oral lesion  See above.    - Surgical Pathology Exam          Leti Parsons   11/14/2017 2:50 PM

## 2021-06-16 PROBLEM — D56.3 ALPHA THALASSEMIA TRAIT: Status: ACTIVE | Noted: 2019-07-19

## 2021-06-16 NOTE — TELEPHONE ENCOUNTER
Telephone Encounter by Ashlie Camacho LPN at 7/26/2019 12:33 PM     Author: Ashlie Camacho LPN Service: -- Author Type: Certified Medical Assistant    Filed: 7/26/2019 12:37 PM Encounter Date: 7/26/2019 Status: Signed    : Ashlie Camacho LPN (Licensed Nurse)       Patient Returning Call  Reason for call:  Results   Information relayed to patient:  Anjelica Breen MA           7/26/19 12:14 PM   Note      Called pt and Left Vm to call back so can give pt covering provider's explanation below.  Per result notes from labs - PCP said to RTC in 4 months to have labs re-cechecked.  Covering provider said in previous message pt does not need repeat labs today and apt was canceled.  Please relay this to pt as well as PCP is out of clinic until Monday. Thanks.                  7/26/19 11:37 AM   Georgina Tomas MD routed this conversation to Georgina Tomas Care Team Pool   Georgina Tomsa MD           7/26/19 11:36 AM   Note      Her labs showed that she does have alpha thalassemia trait, a genetic condition she was born with, that causes mildly low hemoglobin. This is not something to be concerned about. She also does have iron deficiency, so she should continue taking her oral iron supplement as Dr. Parsons ordered.     Georgina Tomas MD      Patient has additional questions:  Yes  If YES, what are your questions/concerns:  Patient requested a call from provider to discuss directly.   Okay to leave a detailed message?: Yes

## 2021-06-17 NOTE — PATIENT INSTRUCTIONS - HE
Patient Instructions by Leti Parsons MD at 7/11/2019  8:30 AM     Author: Leti Parsons MD Service: -- Author Type: Physician    Filed: 7/11/2019  9:11 AM Encounter Date: 7/11/2019 Status: Signed    : Leti Parsons MD (Physician)       Patient Education     Cambios fibroquísticos en el seno (se presumen) [Fibrocystic Breat Changes (Presumed)  João o más bultos se encontraron en gilbert seno. Lo más probable es que estos raul cambios fibroquísticos en el seno.  Con esta afección, se ursula en gilbert seno tejido fibroso y pequeños quistes, que puede aumentar y disminuir de tamaño con gilbert ciclo menstrual. Los síntomas pueden incluir pequeños bultos en los senos que usted puede louisa y sentir. También podría tener dolor, hinchazón y sensibilidad en el seno.  Los bultos asociados con los cambios fibroquísticos no son cáncer, ni llevan a un cáncer. Son muy comunes y afectan a la mayoría de las mujeres en algún momento de gilbert genoveva.  No existe un tratamiento específico para los cambios fibroquísticos en los senos. Por lo general, todo lo que se necesita son los cuidados en el hogar para aliviar los síntomas.  Si se desea o se necesita hacer samuel confirmación del diagnóstico, pueden hacerse otras evaluaciones que pueden incluir:    Otro examen por parte de gilbert proveedor de atención médica o gilbert ginecólogo    Pruebas de imágenes, marky samuel mamografía y samuel ecografía    Samuel biopsia (procedimiento para remover pequeñas muestras de tejido de los bultos en los senos)  De ser necesario, le informarán más sobre esto.  Cuidados en el hogar    Si está teniendo dolor en el seno:  ? Orrick un analgésico de venta sin receta si se lo indica gilbert proveedor  ? Use un sostén ajustado que le dé soporte adicional. Si tiene dolor en el seno por la noche, trate de usar el sostén para dormir.  ? Aplíquese samuel compresa tibia (samuel toallita empapada en agua tibia) en el seno. También puede usar samuel bolsa de [place].    Revísese los  senos todos los días. Lleve un registro de si los bultos parecen estar cambiando de tamaño o de sensibilidad con gilbert período menstrual. Nubieber le ayudará a gilbert proveedor de atención médica hacer cualquier corrección en el diagnóstico.    Pregúntele a gilbert proveedor de atención médica qué cambios en gilbert estilo de genoveva o en gilbert alimentación, o qué tratamientos alternativos puede probar para ayudar a tratar alisia problema.  Cuidados de seguimiento  Rober un seguimiento con gilbert proveedor marky le indique. Es posible que le aconsejen programar otra irene para un examen de los senos con gilbert proveedor o ginecólogo unos 7 a 10 días después del comienzo de giblert período.  Cuándo buscar consejo médico  Llame a gilbert proveedor de atención médica si algo de lo siguiente ocurre:    Fiebre de 100.4 F (38 C) o más    Enrojecimiento o hinchazón de los senos    Supuración de lashell de los pezones    Cambios visibles en la piel sobre los pezones o los senos    Los bultos crecen de tamaño, se sienten duros o tienen samuel forma irregular    Se ursula nuevos bultos que parecen o se sienten diferentes de los actuales  Date Last Reviewed: 4/26/2015 2000-2017 [employer] [address] [place] [address], [place] [address]. Todos los derechos reservados. Esta información no pretende sustituir la atención médica profesional. Sólo gilbert médico puede diagnosticar y tratar un problema de estefanía.

## 2021-06-18 NOTE — PROGRESS NOTES
"S:  42 yo female who is here with complaints of carpal tunnel sx in her right arm.  This is worse with feeding her kids, driving, and sleepig.  She only uses her brace intermittently.    She has received injections in the past, the last one was about 12 years ago.  This feels exactly like the last time she had carpal tunnel.  The last injection did not really help her.  The numbness and the discomfort are present in the medial aspect of her bilateral hands right greater than left.  She has not been dropping anything.  The pain and discomfort does come at night.  It does not really matter what position she is in.  It also happens with driving.  She denies any pain in her neck.  She has had a mild headache over the past couple of weeks.  This does not appear to be getting any worse.  The headache is always there.  Does not wake her up out of sleep.  She has not tried any medications for the headache.  She denies any significant vision changes.  She does complain of some discomfort or pain in her right ear.  This is intermittent.  No hearing loss though she does occasionally hear like a thumping sound in her right ear.  No dizziness or lightheadedness.  No balance problems.  No dysarthria.  No difficulty finding her words or speaking.    2.  She complains of some pain in her bilateral hips.  This seems to be worse with running.  She stretches before she runs that she does not stretch afterwards.  It does hurt to roll over and sleep on her bilateral hips.  No numbness, tingling, weakness in her lower extremities.       fam hx:  2 last children born with hearing loss.      O:  BP 96/60  Pulse 73  Temp 98.5  F (36.9  C) (Oral)   Resp 16  Ht 4' 11\" (1.499 m)  Wt 114 lb (51.7 kg)  LMP 05/18/2018  SpO2 99%  BMI 23.03 kg/m2  Gen:  Nad, alert  Neuro:  No nystagmus noted on exam.  Cranial nerves intact.  Eomi.  Perrla.  Palate elevates normally.  Muscle strength is normal.  Reflexes are normal.  Finger to nose is " normal.  Romberg is negative.  Gait is normal.  Sensation to monofilament is diminished in the 3 medial fingers right side greater than left side in the hands.  Strength is intact in her hands and her fingers.  She does have some tenderness to palpation over the medial elbow.  Rapid alternative movements are normal.    Tinel's and Phalen's are negative.  No skin changes are noted.  No thenar eminence wasting is noted.  Sensation is intact in her thumb and forefinger bilaterally.  She is full range of motion in her neck.  She does have significant tenderness to palpation over some of the muscles in her neck.  Palpation of some of these actually reproduces the feeling of numbness into her bilateral arms as well as a feeling of discomfort into her bilateral arms.  She has tenderness over some of her occipital trigger points.  Palpation of these does not reproduce the pain into her ear is still stretching her neck does reproduce the pain and discomfort into her ears.  She has tenderness to palpation over the bilateral hips both in the anterior musculature as well as over the trochanteric bursa.  She is full range of motion in her hips.  Heent;  Conjunctiva and sclera are normal.  Bilateral tm's are normal.  Oropharynx is normal. Bilateral nares are normal.  Hearing is normal bilaterally.  Bones anterior posterior to her ears are nontender.  No erythema is noted.      Patient Active Problem List   Diagnosis     Piriformis Syndrome     Carpal tunnel syndrome     Non-ABO Incompatibility Reaction From Transfusion     Current Outpatient Prescriptions on File Prior to Visit   Medication Sig Dispense Refill     HYDROcodone-acetaminophen 5-325 mg per tablet Take 1 tablet by mouth every 4 (four) hours as needed for pain. 8 tablet 0     loratadine (CLARITIN) 10 mg tablet Take 1 tablet (10 mg total) by mouth daily. 30 tablet 11     multivitamin with minerals (THERA-M) 9 mg iron-400 mcg Tab tablet Take 1 tablet by mouth daily.        No current facility-administered medications on file prior to visit.           No results found for this or any previous visit (from the past 48 hour(s)).      Assessment/Plan:  1. Numbness and tingling in both hands  Refer to neurology as well as obtain an EMG.  There is concern for either central etiology or an etiology in her neck of this pain given the bilateral symptoms.  I did let her know that this is not carpal tunnel.  I did encourage her to keep her weight off of her bilateral elbows.  I encouraged her to stretch out her neck on a daily basis to see if she can improve some of the numbness in her bilateral hands.  She may need imaging of her head and neck for both her headaches as well as the numbness.  - EMG- Both Arms; Future  - Ambulatory referral to Neurology  - Vitamin B12  - Thyroid Cascade  - HM1(CBC and Differential)  - Basic Metabolic Panel  - HM1 (CBC with Diff)    2. Headache    - EMG- Both Arms; Future  - Ambulatory referral to Neurology  - Vitamin B12  - Thyroid Cascade  - HM1(CBC and Differential)  - Basic Metabolic Panel  - HM1 (CBC with Diff)    3.  Bilateral hip pain likely multifactorial secondary to running.  It did show her how to do a runners lunge stretch today.  She said that this managed to stretch out the exact locations where she was having discomfort.  I encouraged her to do this every day and after every single run.  If her pain persists she should follow-up.  I did encourage her not to sleep on her side but rather sleep on her back.  She can also use ice over her bilateral trochanteric bursa.      Leti Parsons   6/1/2018 1:57 PM

## 2021-06-19 NOTE — LETTER
Letter by Leti aPrsons MD at      Author: Leti Parsons MD Service: -- Author Type: --    Filed:  Encounter Date: 5/13/2019 Status: (Other)         Nataliya Lei  [address] [facility] [address]                     May 13, 2019    Dear Nataliya:    At the [facility], we care about you and your health. When diagnosed early, many diseases and illness can be treated and possibly prevented. That's why it is important to see your primary care provider regularly for routine examinations and to maintain your overall health.We are trying to contact you to ensure you receive the best level of care. Our records show that you are overdue for Pap Smear.  It is important we see you in the clinic soon, as an interruption in your care may result in delays for future medication/supply refills. Please contact our office at [phone] to schedule an appointment.  If you are receiving care elsewhere, please contact us so that we can update our records.   On behalf of [facility], thank you for trusting us with your care.       If you have any questions or concerns, please don't hesitate to call.    Sincerely,  [facility] Staff      Electronically signed by Leti Parsons MD

## 2021-06-19 NOTE — LETTER
Letter by Leti Parsons MD at      Author: Leti Parsons MD Service: -- Author Type: --    Filed:  Encounter Date: 10/11/2019 Status: Signed               [address]  [place] [address]   PHONE [phone]  [phone]     October 11, 2019  Nataliya Lei  [facility] [facility] [address]    Dear Nataliya:    Below are the results from your recent visit.  Your results are normal.  Your hemoglobin is now normal.      Resulted Orders   HM1 (CBC with Diff)   Result Value Ref Range    WBC 7.1 4.0 - 11.0 thou/uL    RBC 4.82 3.80 - 5.40 mill/uL    Hemoglobin 14.8 12.0 - 16.0 g/dL    Hematocrit 43.0 35.0 - 47.0 %    MCV 89 80 - 100 fL    MCH 30.7 27.0 - 34.0 pg    MCHC 34.4 32.0 - 36.0 g/dL    RDW 15.1 (H) 11.0 - 14.5 %    Platelets 164 140 - 440 thou/uL    MPV 8.6 7.0 - 10.0 fL    Neutrophils % 59 50 - 70 %    Lymphocytes % 30 20 - 40 %    Monocytes % 8 2 - 10 %    Eosinophils % 3 0 - 6 %    Basophils % 1 0 - 2 %    Neutrophils Absolute 4.2 2.0 - 7.7 thou/uL    Lymphocytes Absolute 2.1 0.8 - 4.4 thou/uL    Monocytes Absolute 0.5 0.0 - 0.9 thou/uL    Eosinophils Absolute 0.2 0.0 - 0.4 thou/uL    Basophils Absolute 0.1 0.0 - 0.2 thou/uL         If you have any questions or concerns, please do not hesitate to call.    Sincerely,      Leti Parsons MD  October 11, 2019

## 2021-06-19 NOTE — PROGRESS NOTES
Preoperative Exam    Scheduled Procedure: right cubital tunnel release; right carpal tunnel release  Surgery Date:  8/30/18  Surgery Location: [facility] ambulatory surgery     Surgeon:  Lewis    Assessment/Plan:     1. Pre-op exam  Pregnancy (Beta-hCG, Qual), Urine   2. Bilateral carpal tunnel syndrome     3. Non-ABO incompatibility reaction          Known Risk Factors:  Healthy 43 year old  Has anti ayse blood antibodies    1. Preoperative workup as follows:  Orders Placed This Encounter   Procedures     Pregnancy (Beta-hCG, Qual), Urine           2. Change in medication regimen before surgery:    No nsaids.  Hold vitamin morning of      3.    Patient Instructions     Hold all supplements, aspirin and NSAIDs for 7 days prior to surgery.  Follow your surgeon's direction on when to stop eating and drinking prior to surgery.  Your surgeon will be managing your pain after your surgery.    Remove all jewelry and metal piercings before your surgery.   Remove nail polish from fingers before surgery.          Surgical Procedure Risk: Low (reported cardiac risk generally < 1%)    Cardiac Risk Assessment: no increased risk for major cardiac complications    Patient has had anesthesia before with no history of anesthesia reaction.  negative family history of anesthetic problems.      Patient approved for surgery with general or local anesthesia.        Functional Status: Independent  Patient plans to recover at home with family.         Subjective:      Nataliya Lei is a 43 y.o. female  who presents for a preoperative consultation.  Has had numbness, tingling in right hand over the last 4 years--progressive.  Losing strength.      Pertinent History  Do you have difficulty breathing after walking up a flight of stairs:No  Do you have chest pain after walking up a flight of stairs:  No  History of obstructive sleep apnea:  No  Steroid use in the last 6 months: No  Frequent Aspirin/NSAID use: No  Prior Blood Transfusion: Yes:   with c section  Prior Blood Transfusion Reaction: anti lesa antibodies  If for some reason prior to, during or after the procedure, if it is medically indicated, would you be willing to have a blood transfusion?:  There is no transfusion refusal.      Have you had prior anesthesia?:Yes  Have you  had a previous anesthesia reaction:  No  Have any family members had a previous anesthesia reaction: No  Do you have a history of a clotting or bleeding disorder?:  No  Do any family members have a history of a clotting or bleeding disorder?:  No     ROS:    GENERAL: Fever: no Chills  no   Fatigue no  HEENT: Cold symptoms:no  RESPIRATORY:  Cough: no       Dyspnea: no  CARDIOVASCULAR: Chest Pain: no  Palpitations: no  GI: Vomiting: no   Diarrhea: no   : Dysuria: no  Frequency no  NEURO: Dysphasia: no   Motor Weakness: right and left hands--unchanged   Numbness:  right and left hands  SKIN: Rash: no  HEME:  Bleeding/Bruising Issues: no  MS:  Lower Extremity Swelling no    Exercise Capacity: Normal   Functional mets >4--climbs flight of stairs or walks 1-2 blocks without stopping    MEDICATIONS:  Current Outpatient Prescriptions on File Prior to Visit   Medication Sig Dispense Refill     multivitamin with minerals (THERA-M) 9 mg iron-400 mcg Tab tablet Take 1 tablet by mouth daily.       [DISCONTINUED] loratadine (CLARITIN) 10 mg tablet Take 1 tablet (10 mg total) by mouth daily. 30 tablet 11     No current facility-administered medications on file prior to visit.        ALLERGIES:  Allergies   Allergen Reactions     Blood-Group Specific Substance      Anti Lesa antibody present       PROBLEM LIST:  Patient Active Problem List   Diagnosis     Piriformis Syndrome     Carpal tunnel syndrome     Non-ABO Incompatibility Reaction From Transfusion       PAST MEDICAL HISTORY:  Past Medical History:   Diagnosis Date     Carpal tunnel syndrome     Created by Conversion       delivery delivered 2017     Non-ABO  "Incompatibility Reaction From Transfusion     Created by [facility] Annotation: 2013  5:41PM - Issa Leti: anti ayse ab.      Serum Enzyme Levels - ALT (SGPT) Elevated     Created by Conversion        PAST SURGICAL HISTORY:  Past Surgical History:   Procedure Laterality Date      SECTION N/A 2017    Procedure: REPEAT  SECTION  ;  Surgeon: Kimberly Reyes MD;  Location: [facility] OR;  Service:      CO  DELIVERY ONLY      Description:  Section;  Recorded: 2012;  Comments: x 2       SOCIAL HISTORY:  Social History     Occupational History     Not on file.     Social History Main Topics     Smoking status: Never Smoker     Smokeless tobacco: Never Used     Alcohol use No     Drug use: No     Sexual activity: Yes     Partners: Male     Birth control/ protection: None       FAMILY HISTORY:  Family History   Problem Relation Age of Onset     Stroke Mother        IMMUNIZATIONS:  Immunization History   Administered Date(s) Administered     Influenza, inj, historic,unspecified 2010, 2013, 10/20/2016     Tdap 2010, 2013, 10/20/2016             Objective:     Vitals:    18 1935   BP: 98/60   Patient Site: Right Arm   Patient Position: Sitting   Cuff Size: Adult Regular   Pulse: 60   Resp: 16   Temp: 98.8  F (37.1  C)   TempSrc: Oral   Weight: 113 lb (51.3 kg)   Height: 4' 11\" (1.499 m)       GEN:  NAD, cooperative  MS:  Alert, oriented, affect normal, speech normal  HEENT:  Conjunctiva clear.  Sclera anicteric.  Nares clear.  Oropharynx clear without erythema or exudate.  TMs and EACs normal.  NECK:  supple, no lymphadenopathy or thyromegaly  CV:  S1S2 RRR, no M/R/G  RESP:  CTA bilaterally  ABD: +BS, soft, nontender, nondistended, No organomegaly   EXT:  Warm and dry, no edema   NEUROLOGIC:  PERRLA. .  No tremor, no focal findings.  Normal gait.    SKIN:  No rashes or lesions.               Lab Review:   Recent Results (from " the past 24 hour(s))   Pregnancy (Beta-hCG, Qual), Urine    Collection Time: 08/20/18  8:10 PM   Result Value Ref Range    Pregnancy Test, Urine Negative Negative    Specific Gravity, UA 1.015 1.001 - 1.030                   Mehrdad Paige MD  8/20/2018     Electronically signed by Mehrdad Paige MD

## 2021-06-19 NOTE — LETTER
Letter by Leti Parsons MD at      Author: Leti Parsons MD Service: -- Author Type: --    Filed:  Encounter Date: 7/18/2019 Status: (Other)               [address]  [place] [address]   PHONE [phone]  [phone]     July 18, 2019  Nataliya Lei  [facility] [facility] [address]    Dear Nataliya:    Below are the results from your recent visit.  Your pap smear results are normal.  You need another pap smear in 5 years .     Resulted Orders   Pregnancy, Urine   Result Value Ref Range    Pregnancy Test, Urine Negative Negative    Narrative    This test is for screening purposes. Results should be interpreted along with the clinical picture. Confirmation testing is available if warranted by ordering Test 143, Beta HCG, Quantitative.   Hemoglobin   Result Value Ref Range    Hemoglobin 8.0 (L) 12.0 - 16.0 g/dL   Gynecologic Cytology (PAP Smear)   Result Value Ref Range    Case Report       Gynecologic Cytology Report                       Case: [id number]                                   Authorizing Provider:  Leti Parsons MD   Collected:           07/11/2019 0927              Ordering Location:     [facility]            Received:            07/11/2019 0927                                     Medicine/OB                                                                  First Screen:          Mariah Lopez CT                                                                               (ASCP)                                                                       Rescreen:              Lenny Avelar CT                                                                           (ASCP)                                                                       Specimen:    SUREPATH PAP, SCREENING, Endocervical/cervical                                             Interpretation  Negative for squamous intraepithelial lesion or  malignancy.      Negative for squamous intraepithelial lesion or malignancy    Result Flag Normal Normal    Specimen Adequacy       Satisfactory for evaluation, endocervical/transformation zone component absent    HPV Reflex? Yes regardless of result     HIGH RISK No     LMP/Menopause Date 6/15/19     Abnormal Bleeding No     Pt Status n/a     Birth Control/Hormones None     Previous Normal/Date 2013     Prev Abn Date/Dx none     Cervical Appearance Normal    Lipid Cascade   Result Value Ref Range    Cholesterol 196 <=199 mg/dL    Triglycerides 96 <=149 mg/dL    HDL Cholesterol 61 >=50 mg/dL    LDL Calculated 116 <=129 mg/dL    Patient Fasting > 8hrs? Yes    Basic Metabolic Panel   Result Value Ref Range    Sodium 139 136 - 145 mmol/L    Potassium 4.0 3.5 - 5.0 mmol/L    Chloride 109 (H) 98 - 107 mmol/L    CO2 21 (L) 22 - 31 mmol/L    Anion Gap, Calculation 9 5 - 18 mmol/L    Glucose 96 70 - 125 mg/dL    Calcium 9.0 8.5 - 10.5 mg/dL    BUN 14 8 - 22 mg/dL    Creatinine 0.56 (L) 0.60 - 1.10 mg/dL    GFR MDRD Af Amer >60 >60 mL/min/1.73m2    GFR MDRD Non Af Amer >60 >60 mL/min/1.73m2    Narrative    Fasting Glucose reference range is 70-99 mg/dL per  American Diabetes Association (ADA) guidelines.   HPV High Risk DNA Cervical   Result Value Ref Range    HPV Source SurePath     HPV16 DNA Negative NEG    HPV18 DNA Negative NEG    Other HR HPV Negative NEG    Final Diagnosis SEE NOTES       Comment:      This patient's sample is negative for HPV DNA.  This test was developed and its performance characteristics determined by the  [facility], [facility]. It  has not been cleared or approved by the FDA. The laboratory is regulated under  CLIA as qualified to perform high-complexity testing. This test is used for  clinical purposes. It should not be regarded as investigational or for  research.  (Note)  METHODOLOGY:  The Roche miryam 4800 system uses automated  extraction,  simultaneous amplification of HPV (L1 region) and beta-globin,  followed by  real time detection of fluorescent labeled HPV and beta  globin using specific oligonucleotide probes . The test specifically  identifies types HPV 16 DNA and HPV 18 DNA while concurrently  detecting the rest of the high risk types (31, 33, 35, 39, 45, 51,  52, 56, 58, 59, 66 or 68).    COMMENTS:  This test is not intended for use as a screening device  for women under age 30 with normal cervical   cytology.  Results should  be correlated with cytologic and histologic findings. Close clinical  followup is recommended.        Specimen Description Cervical Cells       Comment:      Performed and/or entered by:  [address] [address]          If you have any questions or concerns, please do not hesitate to call.    Sincerely,      Leti Parsons MD  July 18, 2019

## 2021-06-20 NOTE — PROGRESS NOTES
Assessment/Plan:      Visit for Preoperative Exam.      1. Pre-op exam     2. Carpal tunnel syndrome of left wrist     3. Cubital tunnel syndrome on left           Patient approved for surgery with general or local anesthesia. Copy of the pre-op was given to the patient to bring along on the day of surgery. Proceed with proposed surgery without additional clinical clarifications.     Subjective:     Scheduled Procedure: Carpal Tunnel Left Hand, and Left Cubital Tunnel  Surgery Date:  18  Surgery Location:  [facility].  Fax [phone]  Surgeon:  Dr. Saucedo.    Has left carpal and cubital tunnel syndrome.  Has tingling and moderate pain in hand, increasing for months.  Abnormal EMG.  Had repair of the other side in August.  Has anti-Ayse antibodies.  Brain MRI was normal in August.    Current Outpatient Prescriptions   Medication Sig Dispense Refill     multivitamin with minerals (THERA-M) 9 mg iron-400 mcg Tab tablet Take 1 tablet by mouth daily.       No current facility-administered medications for this visit.        Allergies   Allergen Reactions     Blood-Group Specific Substance      Anti Ayse antibody present       Immunization History   Administered Date(s) Administered     Influenza, inj, historic,unspecified 2010, 2013, 10/20/2016     Tdap 2010, 2013, 10/20/2016       Patient Active Problem List   Diagnosis     Piriformis Syndrome     Carpal tunnel syndrome     Non-ABO Incompatibility Reaction From Transfusion       Past Medical History:   Diagnosis Date     Carpal tunnel syndrome     Created by Conversion       delivery delivered 2017     Non-ABO Incompatibility Reaction From Transfusion     Created by [facility] Annotation: 2013  5:41PM - Leti Parsons: anti ayse ab.      Serum Enzyme Levels - ALT (SGPT) Elevated     Created by Conversion        Social History     Social History     Marital status:      Spouse name: N/A     Number of  "children: N/A     Years of education: N/A     Occupational History     Not on file.     Social History Main Topics     Smoking status: Never Smoker     Smokeless tobacco: Never Used     Alcohol use No     Drug use: No     Sexual activity: Yes     Partners: Male     Birth control/ protection: None     Other Topics Concern     Not on file     Social History Narrative       Past Surgical History:   Procedure Laterality Date      SECTION N/A 2017    Procedure: REPEAT  SECTION  ;  Surgeon: Kimberly Reyes MD;  Location: [facility] Main OR;  Service:      MS  DELIVERY ONLY      Description:  Section;  Recorded: 2012;  Comments: x 2       History of Present Illness  Recent Health  Fever: no  Chills: no  Fatigue: no  Chest Pain: no  Cough: no  Dyspnea: no  Urinary Frequency: no  Nausea: no  Vomiting: no  Diarrhea: no  Abdominal Pain: no  Easy Bruising: no  Lower Extremity Swelling: no  Poor Exercise Tolerance: no    Most recent Health Maintenance Visit:  1 month(s) ago    Pertinent History  Prior Anesthesia: yes  Previous Anesthesia Reaction:  no  Diabetes: no  Cardiovascular Disease: no  Pulmonary Disease: no  Renal Disease: no  GI Disease: no  Sleep Apnea: no  Thromboembolic Problems: no  Clotting Disorder: no  Bleeding Disorder: no  Transfusion Reaction: No  Impaired Immunity: no  Steroid use in the last 6 months: no  Frequent Aspirin use: no    Family history of None    Social history of None.    After surgery, the patient plans to recover at home with family.    Review of Systems  Review of Systems   Constitutional: Negative.    HENT: Negative.    Eyes: Negative.    Respiratory: Negative.    Cardiovascular: Negative.    Gastrointestinal: Negative.              Objective:         Vitals:    18 1106   BP: 100/60   Pulse: 63   Resp: 17   Temp: 98.1  F (36.7  C)   TempSrc: Oral   SpO2: 98%   Weight: 112 lb (50.8 kg)   Height: 4' 11\" (1.499 m)       Physical " Exam:  Physical Exam     Eyes: EOM full, pupils normal, conjunctivae normal  Ears: TM's and canals normal  Oropharynx: normal  Neck: supple without adenopathy or thyromegaly  Lungs: normal  Heart: regular rhythm, normal rate, no murmur  Abdomen: no HSM, mass or tenderness  Extremities: FROM, normal strength and sensation

## 2021-08-14 ENCOUNTER — HEALTH MAINTENANCE LETTER (OUTPATIENT)
Age: 46
End: 2021-08-14

## 2021-10-09 ENCOUNTER — HEALTH MAINTENANCE LETTER (OUTPATIENT)
Age: 46
End: 2021-10-09

## 2021-12-01 ENCOUNTER — HOSPITAL ENCOUNTER (EMERGENCY)
Facility: HOSPITAL | Age: 46
Discharge: HOME | End: 2021-12-01
Attending: EMERGENCY MEDICINE | Admitting: EMERGENCY MEDICINE
Payer: MEDICAID

## 2021-12-01 ENCOUNTER — APPOINTMENT (OUTPATIENT)
Dept: CT IMAGING | Facility: HOSPITAL | Age: 46
End: 2021-12-01
Attending: EMERGENCY MEDICINE
Payer: MEDICAID

## 2021-12-01 VITALS
SYSTOLIC BLOOD PRESSURE: 122 MMHG | TEMPERATURE: 97.8 F | DIASTOLIC BLOOD PRESSURE: 76 MMHG | RESPIRATION RATE: 18 BRPM | OXYGEN SATURATION: 97 % | HEART RATE: 64 BPM

## 2021-12-01 DIAGNOSIS — N83.209 HEMORRHAGIC OVARIAN CYST: ICD-10-CM

## 2021-12-01 DIAGNOSIS — K80.50 BILIARY COLIC: ICD-10-CM

## 2021-12-01 LAB
ALBUMIN SERPL-MCNC: 4.2 G/DL (ref 3.5–5)
ALBUMIN UR-MCNC: NEGATIVE MG/DL
ALP SERPL-CCNC: 82 U/L (ref 45–120)
ALT SERPL W P-5'-P-CCNC: 18 U/L (ref 0–45)
ANION GAP SERPL CALCULATED.3IONS-SCNC: 9 MMOL/L (ref 5–18)
APPEARANCE UR: CLEAR
AST SERPL W P-5'-P-CCNC: 24 U/L (ref 0–40)
BACTERIA #/AREA URNS HPF: ABNORMAL /HPF
BASOPHILS # BLD AUTO: 0 10E3/UL (ref 0–0.2)
BASOPHILS NFR BLD AUTO: 0 %
BILIRUB DIRECT SERPL-MCNC: 0.3 MG/DL
BILIRUB SERPL-MCNC: 1.7 MG/DL (ref 0–1)
BILIRUB UR QL STRIP: NEGATIVE
BUN SERPL-MCNC: 9 MG/DL (ref 8–22)
CALCIUM SERPL-MCNC: 9.4 MG/DL (ref 8.5–10.5)
CHLORIDE BLD-SCNC: 105 MMOL/L (ref 98–107)
CO2 SERPL-SCNC: 24 MMOL/L (ref 22–31)
COLOR UR AUTO: COLORLESS
CREAT SERPL-MCNC: 0.58 MG/DL (ref 0.6–1.1)
EOSINOPHIL # BLD AUTO: 0.2 10E3/UL (ref 0–0.7)
EOSINOPHIL NFR BLD AUTO: 2 %
ERYTHROCYTE [DISTWIDTH] IN BLOOD BY AUTOMATED COUNT: 14.6 % (ref 10–15)
GFR SERPL CREATININE-BSD FRML MDRD: >90 ML/MIN/1.73M2
GLUCOSE BLD-MCNC: 93 MG/DL (ref 70–125)
GLUCOSE UR STRIP-MCNC: NEGATIVE MG/DL
HCG SERPL QL: NEGATIVE
HCT VFR BLD AUTO: 38.2 % (ref 35–47)
HGB BLD-MCNC: 11.8 G/DL (ref 11.7–15.7)
HGB UR QL STRIP: NEGATIVE
IMM GRANULOCYTES # BLD: 0 10E3/UL
IMM GRANULOCYTES NFR BLD: 0 %
KETONES UR STRIP-MCNC: NEGATIVE MG/DL
LEUKOCYTE ESTERASE UR QL STRIP: NEGATIVE
LIPASE SERPL-CCNC: 22 U/L (ref 0–52)
LYMPHOCYTES # BLD AUTO: 1.7 10E3/UL (ref 0.8–5.3)
LYMPHOCYTES NFR BLD AUTO: 25 %
MCH RBC QN AUTO: 25.1 PG (ref 26.5–33)
MCHC RBC AUTO-ENTMCNC: 30.9 G/DL (ref 31.5–36.5)
MCV RBC AUTO: 81 FL (ref 78–100)
MONOCYTES # BLD AUTO: 0.5 10E3/UL (ref 0–1.3)
MONOCYTES NFR BLD AUTO: 8 %
MUCOUS THREADS #/AREA URNS LPF: PRESENT /LPF
NEUTROPHILS # BLD AUTO: 4.7 10E3/UL (ref 1.6–8.3)
NEUTROPHILS NFR BLD AUTO: 65 %
NITRATE UR QL: NEGATIVE
NRBC # BLD AUTO: 0 10E3/UL
NRBC BLD AUTO-RTO: 0 /100
PH UR STRIP: 6.5 [PH] (ref 5–7)
PLATELET # BLD AUTO: 166 10E3/UL (ref 150–450)
POTASSIUM BLD-SCNC: 4 MMOL/L (ref 3.5–5)
PROT SERPL-MCNC: 7.3 G/DL (ref 6–8)
RBC # BLD AUTO: 4.7 10E6/UL (ref 3.8–5.2)
RBC URINE: 0 /HPF
SODIUM SERPL-SCNC: 138 MMOL/L (ref 136–145)
SP GR UR STRIP: 1.01 (ref 1–1.03)
SQUAMOUS EPITHELIAL: 3 /HPF
UROBILINOGEN UR STRIP-MCNC: <2 MG/DL
WBC # BLD AUTO: 7.1 10E3/UL (ref 4–11)
WBC URINE: <1 /HPF

## 2021-12-01 PROCEDURE — 74177 CT ABD & PELVIS W/CONTRAST: CPT

## 2021-12-01 PROCEDURE — 85025 COMPLETE CBC W/AUTO DIFF WBC: CPT | Performed by: EMERGENCY MEDICINE

## 2021-12-01 PROCEDURE — 81001 URINALYSIS AUTO W/SCOPE: CPT | Performed by: EMERGENCY MEDICINE

## 2021-12-01 PROCEDURE — 83690 ASSAY OF LIPASE: CPT | Performed by: EMERGENCY MEDICINE

## 2021-12-01 PROCEDURE — 80053 COMPREHEN METABOLIC PANEL: CPT | Performed by: EMERGENCY MEDICINE

## 2021-12-01 PROCEDURE — 84703 CHORIONIC GONADOTROPIN ASSAY: CPT | Performed by: EMERGENCY MEDICINE

## 2021-12-01 PROCEDURE — 82248 BILIRUBIN DIRECT: CPT | Performed by: EMERGENCY MEDICINE

## 2021-12-01 PROCEDURE — 250N000011 HC RX IP 250 OP 636: Performed by: EMERGENCY MEDICINE

## 2021-12-01 PROCEDURE — 36415 COLL VENOUS BLD VENIPUNCTURE: CPT | Performed by: EMERGENCY MEDICINE

## 2021-12-01 PROCEDURE — 99285 EMERGENCY DEPT VISIT HI MDM: CPT | Mod: 25

## 2021-12-01 RX ORDER — ONDANSETRON 4 MG/1
4 TABLET, ORALLY DISINTEGRATING ORAL EVERY 8 HOURS PRN
Qty: 10 TABLET | Refills: 0 | Status: SHIPPED | OUTPATIENT
Start: 2021-12-01 | End: 2021-12-04

## 2021-12-01 RX ORDER — IOPAMIDOL 755 MG/ML
100 INJECTION, SOLUTION INTRAVASCULAR ONCE
Status: COMPLETED | OUTPATIENT
Start: 2021-12-01 | End: 2021-12-01

## 2021-12-01 RX ORDER — OXYCODONE HYDROCHLORIDE 5 MG/1
5 TABLET ORAL EVERY 6 HOURS PRN
Qty: 12 TABLET | Refills: 0 | Status: SHIPPED | OUTPATIENT
Start: 2021-12-01

## 2021-12-01 RX ADMIN — IOPAMIDOL 100 ML: 755 INJECTION, SOLUTION INTRAVENOUS at 12:40

## 2021-12-01 ASSESSMENT — ENCOUNTER SYMPTOMS
DYSURIA: 0
CONFUSION: 0
ABDOMINAL PAIN: 1
HEMATURIA: 0
FEVER: 0
SHORTNESS OF BREATH: 0
VOMITING: 0
BACK PAIN: 0
NAUSEA: 0

## 2021-12-01 NOTE — DISCHARGE INSTRUCTIONS
Return to the ER for worsening pain, fever, development of lightheadedness or other concerns    You should take ibuprofen, 600mg, three times per day as needed for pain.  You can also use acetaminophen, 650 mg, up to four times per day as needed for pain.  You can use these medications together, there are noconcerning interactions.  If this does not adequately control your pain, you can use 1-2 tabs of the prescribed oxycodone every 6 hours as needed for uncontrolled pain.  If you use this medication, you should not drive orperform other activities that require full attention as this medication may make you drowsy.  Oxycodone, like all opiate pain medications, is potentially habit forming and you should only use the minimum amount necessary tocontrol your pain.

## 2021-12-01 NOTE — ED PROVIDER NOTES
EMERGENCY DEPARTMENT ENCOUNTER      NAME: Nataliya Ayala  AGE: 46 year old female  YOB: 1975  MRN: [id number]  EVALUATION DATE & TIME: 12/1/2021 10:35 AM    PCP: System, Provider Not In        Chief Complaint   Patient presents with     Abdominal Pain         FINAL IMPRESSION:  1. Biliary colic    2. Hemorrhagic ovarian cyst          ED COURSE & MEDICAL DECISION MAKING:    Pertinent Labs & Imaging studies reviewed. (See chart for details)  46 year old female presents to the Emergency Department for evaluation of right-sided abdominal pain    ED Course as of 12/01/21 1323   Wed Dec 01, 2021   1317 Patient presents with right-sided abdominal pain.  Differential includes appendicitis, renal colic, pregnancy, biliary colic, constipation, ovarian torsion, ileitis, hernia, UTI   1318 Based on history and examination doubt ovarian torsion.  Patient also has some right upper quadrant tenderness therefore CT abdomen ordered.  CT shows evidence of gallstones as well as a recently ruptured hemorrhagic cyst.  Hemodynamically normal.   1318 Hemoglobin: 11.8   1318 HCG Qualitative Serum: Negative  Ectopic unlikely   1319 Exam not suggestive of acute cholecystitis.   1319 Likely biliary colic and ruptured hemorrhagic cyst   1319 Plan for discharge home with recommendation follow-up with general surgery and gynecology.  Sent home with Zofran and oxycodone recommend use I Profen and Tylenol first   1319 WBC: 7.1   1319 Lipase: 22   1320 HCG Qualitative Serum: Negative   1320 Ectopic highly unlikely   1322 BP: 122/76   1322 Temp src: Oral   1322 Pulse: 67   1322 BP: 110/57   1322 Pulse: 67   1322 Observed in ER for greater than 3 hours without worsening.        11:10 AM The patient was evaluated by the medical student, Edison. We ten discussed the patient's case and the plan for the patient's ED workup.  1:10 PM I rechecked and updated the patient. We discussed the plan for discharge and the patient is agreeable.  Reviewed supportive cares, symptomatic treatment, outpatient follow up, and reasons to return to the Emergency Department. Patient to be discharged by ED RN.    At the conclusion of the encounter I discussed the results of all of the tests and the disposition. The questions were answered. The patient or family acknowledged understanding and was agreeable with the care plan.       MEDICATIONS GIVEN IN THE EMERGENCY:  Medications   iopamidol (ISOVUE-370) solution 100 mL (100 mLs Intravenous Given 21 1240)       NEW PRESCRIPTIONS STARTED AT TODAY'S ER VISIT  New Prescriptions    ONDANSETRON (ZOFRAN ODT) 4 MG ODT TAB    Take 1 tablet (4 mg) by mouth every 8 hours as needed for nausea    OXYCODONE (ROXICODONE) 5 MG TABLET    Take 1 tablet (5 mg) by mouth every 6 hours as needed for pain            =================================================================    HPI    Patient information was obtained from: Patient    Use of Intrepreter: N/A       Nataliya Ayala is a 46 year old female with a pertinent history of piriformis syndrome, ectopic pregnancy, and  section (2017) who presents to this ED by walk in for evaluation of abdominal pain.    The patient reports sudden onset of sharp, stabbing lower abdominal pain after sexual intercourse last night. She rated the pain 10/10. She took an Advil which provided some mild relief. The pain is somewhat reduced today, but has now migrated more to her lower right abdomen. She describes the pain as similar to her previous ectopic pregnancy. She does have some concern that she might be pregnant. Her last menstrual period was 2 months ago, and she is having some on and off light vaginal bleeding currently. She denies any history of kidney stones. She denies any nausea, vomiting, dysuria, hematuria, or any other complaints at this time.    REVIEW OF SYSTEMS   Review of Systems   Constitutional: Negative for fever.   Respiratory: Negative for shortness of breath.     Cardiovascular: Negative for chest pain.   Gastrointestinal: Positive for abdominal pain (lower). Negative for nausea and vomiting.   Genitourinary: Positive for vaginal bleeding (light). Negative for dysuria and hematuria.   Musculoskeletal: Negative for back pain.   Skin: Negative for rash.   Psychiatric/Behavioral: Negative for confusion.         PAST MEDICAL HISTORY:  Past Medical History:   Diagnosis Date     Hearing problem        PAST SURGICAL HISTORY:  Past Surgical History:   Procedure Laterality Date     C  DELIVERY ONLY      Description:  Section;  Recorded: 2012;  Comments: x 2      SECTION N/A 2017    Procedure: REPEAT  SECTION  ;  Surgeon: Kimberly Reyes MD;  Location: [facility] OR;  Service:      DECOMPRESSION CUBITAL TUNNEL Left 2018    Procedure: DECOMPRESSION CUBITAL TUNNEL;  Left Cubital Tunnel Release, Possible Ulnar Nerve Transposition, Left Carpal Tunnel Release;  Surgeon: Marcos Saucedo MD;  Location: [facility] OR     RELEASE CARPAL TUNNEL Right 2018    Procedure: RELEASE CARPAL TUNNEL;;  Surgeon: Marcos Saucedo MD;  Location: [facility] OR     RELEASE CARPAL TUNNEL Left 2018    Procedure: RELEASE CARPAL TUNNEL;;  Surgeon: Marcos Saucedo MD;  Location: [facility] OR     RELEASE ULNAR NERVE (ELBOW) Right 2018    Procedure: RELEASE ULNAR NERVE (ELBOW);;  Surgeon: Marcos Saucedo MD;  Location: [facility] OR           CURRENT MEDICATIONS:    Patient's Medications   New Prescriptions    ONDANSETRON (ZOFRAN ODT) 4 MG ODT TAB    Take 1 tablet (4 mg) by mouth every 8 hours as needed for nausea    OXYCODONE (ROXICODONE) 5 MG TABLET    Take 1 tablet (5 mg) by mouth every 6 hours as needed for pain   Previous Medications    HYDROCODONE-ACETAMINOPHEN (NORCO) 5-325 MG PER TABLET    Take 1 tablet by mouth every 6 hours as needed for severe pain   Modified Medications    No medications on file   Discontinued Medications    No  medications on file       ALLERGIES:  Allergies   Allergen Reactions     Blood-Group Specific Substance      Anti Lesa antibody present       FAMILY HISTORY:  Family History   Problem Relation Age of Onset     Cerebrovascular Disease Mother      Cancer Sister 50.00        ovarian       SOCIAL HISTORY:   Social History     Socioeconomic History     Marital status:      Spouse name: Not on file     Number of children: Not on file     Years of education: Not on file     Highest education level: Not on file   Occupational History     Not on file   Tobacco Use     Smoking status: Never Smoker     Smokeless tobacco: Never Used   Substance and Sexual Activity     Alcohol use: No     Drug use: No     Sexual activity: Yes     Partners: Male     Birth control/protection: Condom, None   Other Topics Concern     Parent/sibling w/ CABG, MI or angioplasty before 65F 55M? Not Asked   Social History Narrative     Not on file     Social Determinants of Health     Financial Resource Strain: Not on file   Food Insecurity: Not on file   Transportation Needs: Not on file   Physical Activity: Not on file   Stress: Not on file   Social Connections: Not on file   Intimate Partner Violence: Not on file   Housing Stability: Not on file       VITALS:  Patient Vitals for the past 24 hrs:   BP Temp Temp src Pulse Resp SpO2   12/01/21 1150 122/76 97.8  F (36.6  C) Oral 67 18 97 %   12/01/21 1012 -- -- -- -- -- 99 %   12/01/21 1008 110/57 97.8  F (36.6  C) Oral 67 16 --       PHYSICAL EXAM      Vitals: /76   Pulse 67   Temp 97.8  F (36.6  C) (Oral)   Resp 18   LMP 10/01/2021   SpO2 97%   General: Appears in no acute distress, well-appearing, awake, alert, interactive.  Eyes: Conjunctivae non-injected. Sclera anicteric.  HENT: Atraumatic.  Neck: Supple.  Respiratory/Chest: Respiration unlabored.  Heart: RRR  Abdomen: non distended. RLQ tenderness, no guarding. Negative Lancaster's sign.   Musculoskeletal: Normal extremities. No  edema or erythema.  Skin: Normal color. No rash or diaphoresis.  Neurologic: Face symmetric, moves all extremities spontaneously. Speech clear.  Psychiatric: Oriented to person, place, and time. Affect appropriate.    LAB:  All pertinent labs reviewed and interpreted.  Results for orders placed or performed during the hospital encounter of 12/01/21   CT Abdomen Pelvis w Contrast    Impression    IMPRESSION:     1.  Cholelithiasis but no findings to suggest acute cholecystitis.  2.  Cyst in the right ovary with a small amount of surrounding enhancement. Small amount of intermediate attenuation fluid free fluid in the pelvis. Findings most likely represent rupture of a right ovarian follicle and/or hemorrhagic cyst and small   amount of pelvic hemorrhage.    UA with Microscopic reflex to Culture    Specimen: Urethra; Urine   Result Value Ref Range    Color Urine Colorless Colorless, Straw, Light Yellow, Yellow    Appearance Urine Clear Clear    Glucose Urine Negative Negative mg/dL    Bilirubin Urine Negative Negative    Ketones Urine Negative Negative mg/dL    Specific Gravity Urine 1.011 1.001 - 1.030    Blood Urine Negative Negative    pH Urine 6.5 5.0 - 7.0    Protein Albumin Urine Negative Negative mg/dL    Urobilinogen Urine <2.0 <2.0 mg/dL    Nitrite Urine Negative Negative    Leukocyte Esterase Urine Negative Negative    Bacteria Urine Few (A) None Seen /HPF    Mucus Urine Present (A) None Seen /LPF    RBC Urine 0 <=2 /HPF    WBC Urine <1 <=5 /HPF    Squamous Epithelials Urine 3 (H) <=1 /HPF   Basic metabolic panel   Result Value Ref Range    Sodium 138 136 - 145 mmol/L    Potassium 4.0 3.5 - 5.0 mmol/L    Chloride 105 98 - 107 mmol/L    Carbon Dioxide (CO2) 24 22 - 31 mmol/L    Anion Gap 9 5 - 18 mmol/L    Urea Nitrogen 9 8 - 22 mg/dL    Creatinine 0.58 (L) 0.60 - 1.10 mg/dL    Calcium 9.4 8.5 - 10.5 mg/dL    Glucose 93 70 - 125 mg/dL    GFR Estimate >90 >60 mL/min/1.73m2   CBC with platelets and differential    Result Value Ref Range    WBC Count 7.1 4.0 - 11.0 10e3/uL    RBC Count 4.70 3.80 - 5.20 10e6/uL    Hemoglobin 11.8 11.7 - 15.7 g/dL    Hematocrit 38.2 35.0 - 47.0 %    MCV 81 78 - 100 fL    MCH 25.1 (L) 26.5 - 33.0 pg    MCHC 30.9 (L) 31.5 - 36.5 g/dL    RDW 14.6 10.0 - 15.0 %    Platelet Count 166 150 - 450 10e3/uL    % Neutrophils 65 %    % Lymphocytes 25 %    % Monocytes 8 %    % Eosinophils 2 %    % Basophils 0 %    % Immature Granulocytes 0 %    NRBCs per 100 WBC 0 <1 /100    Absolute Neutrophils 4.7 1.6 - 8.3 10e3/uL    Absolute Lymphocytes 1.7 0.8 - 5.3 10e3/uL    Absolute Monocytes 0.5 0.0 - 1.3 10e3/uL    Absolute Eosinophils 0.2 0.0 - 0.7 10e3/uL    Absolute Basophils 0.0 0.0 - 0.2 10e3/uL    Absolute Immature Granulocytes 0.0 <=0.4 10e3/uL    Absolute NRBCs 0.0 10e3/uL   HCG QUALitative pregnancy (blood)   Result Value Ref Range    hCG Serum Qualitative Negative Negative   Result Value Ref Range    Lipase 22 0 - 52 U/L   Hepatic function panel   Result Value Ref Range    Bilirubin Total 1.7 (H) 0.0 - 1.0 mg/dL    Bilirubin Direct 0.3 <=0.5 mg/dL    Protein Total 7.3 6.0 - 8.0 g/dL    Albumin 4.2 3.5 - 5.0 g/dL    Alkaline Phosphatase 82 45 - 120 U/L    AST 24 0 - 40 U/L    ALT 18 0 - 45 U/L       RADIOLOGY:  Reviewed all pertinent imaging. Please see official radiology report.  CT Abdomen Pelvis w Contrast   Final Result   IMPRESSION:       1.  Cholelithiasis but no findings to suggest acute cholecystitis.   2.  Cyst in the right ovary with a small amount of surrounding enhancement. Small amount of intermediate attenuation fluid free fluid in the pelvis. Findings most likely represent rupture of a right ovarian follicle and/or hemorrhagic cyst and small    amount of pelvic hemorrhage.             I, Zenon Royal, am serving as a scribe to document services personally performed by Dr. Rock based on my observation and the provider's statements to me. I, Ashwin Rock MD attest that Zenon Royal is  acting in a scribe capacity, has observed my performance of the services and has documented them in accordance with my direction.    Ashwin Rock M.D.  Emergency Medicine  [facility] EMERGENCY DEPARTMENT  [address] [place] [address] [id number] [phone]  Dept: [phone]     Jake Rock MD  12/01/21 1325       Jake Rock MD  12/01/21 1136

## 2022-05-16 ENCOUNTER — HEALTH MAINTENANCE LETTER (OUTPATIENT)
Age: 47
End: 2022-05-16

## 2022-09-11 ENCOUNTER — HEALTH MAINTENANCE LETTER (OUTPATIENT)
Age: 47
End: 2022-09-11

## 2023-03-03 NOTE — ANESTHESIA PROCEDURE NOTES
Spinal Block    Patient location during procedure: OR  Start time: 2017 3:22 PM  End time: 2017 3:26 PM  Reason for block: primary anesthetic    Staffing:  Performing  Anesthesiologist: STONE CRUZ    Preanesthetic Checklist  Completed: patient identified, risks, benefits, and alternatives discussed, timeout performed, consent obtained, airway assessed, oxygen available, suction available, emergency drugs available and hand hygiene performed  Spinal Block  Patient position: sitting  Prep: ChloraPrep  Patient monitoring: heart rate, cardiac monitor, continuous pulse ox and blood pressure  Approach: midline  Location: L2-3  Injection technique: single-shot  Needle type: pencil-tip   Needle gauge: 24 G      Additional Notes:  Lot [id number]  Subarachnoid Block for  Section    Informed consent was obtained.  Benefit, alternatives, and risks of procedure were discussed including but not limited to the risks of infection, bleeding, temporary or permanent nerve injury, headache, seizure, cardio-respiratory arrest, and maternal and/or neonateal death.  All questions answered.  Patient agreed to proceed.  Procedural pause was completed prior to commencement of procedure.      The patient was placed in the sitting position.  A mask, sterile gloves, and Spinocan Spinal Tray ([id number]) were used.  After identification of appropriate landmarks, aseptic preparation of the lumbar spine was completed using 10% Povidone-Iodine solution.  A 25 gauge, 1.5 inch needle was used to inject a total of 3 mL 1% Lidocaine to anesthetize the skin and deeper tissues at the level of spinal injection.    A 20 gauge, 1.5 inch spinal introducer needle was placed at the L2-3 level in a ML approach.   A 4 inch, 24 gauge PENCAN needle was gently placed through the introducer needle to access the intrathecal space.  No pain or paresthesias were noted by patient during needle placement.  Clear cerebrospinal fluid flowed freely out of the  needle and in all four quadrants.  No blood evident.  A total of 1.5 mL of 0.75%  Bupivacaine w dextrose   was then injected slowly without paresthesia or pain.  The spinal and introducer needles were removed upon completion of medication injection.  The patient was returned to supine position with left uterine displacement.  The patient tolerated the procedure well.           4 (moderate pain)

## 2023-06-03 ENCOUNTER — HEALTH MAINTENANCE LETTER (OUTPATIENT)
Age: 48
End: 2023-06-03

## 2023-10-07 ENCOUNTER — HEALTH MAINTENANCE LETTER (OUTPATIENT)
Age: 48
End: 2023-10-07

## 2024-11-30 ENCOUNTER — HEALTH MAINTENANCE LETTER (OUTPATIENT)
Age: 49
End: 2024-11-30

## 2025-05-25 ENCOUNTER — HOSPITAL ENCOUNTER (INPATIENT)
Facility: HOSPITAL | Age: 50
LOS: 3 days | Discharge: HOME | End: 2025-05-28
Attending: EMERGENCY MEDICINE

## 2025-05-25 ENCOUNTER — APPOINTMENT (OUTPATIENT)
Dept: MRI IMAGING | Facility: HOSPITAL | Age: 50
End: 2025-05-25
Attending: STUDENT IN AN ORGANIZED HEALTH CARE EDUCATION/TRAINING PROGRAM

## 2025-05-25 DIAGNOSIS — K81.0 ACUTE CHOLECYSTITIS: Primary | ICD-10-CM

## 2025-05-25 DIAGNOSIS — R10.11 RUQ ABDOMINAL PAIN: ICD-10-CM

## 2025-05-25 LAB
ALBUMIN SERPL BCG-MCNC: 4.2 G/DL (ref 3.5–5.2)
ALBUMIN UR-MCNC: NEGATIVE MG/DL
ALP SERPL-CCNC: 71 U/L (ref 40–150)
ALT SERPL W P-5'-P-CCNC: 15 U/L (ref 0–50)
AMORPH CRY #/AREA URNS HPF: ABNORMAL /HPF
ANION GAP SERPL CALCULATED.3IONS-SCNC: 10 MMOL/L (ref 7–15)
APPEARANCE UR: ABNORMAL
AST SERPL W P-5'-P-CCNC: 22 U/L (ref 0–45)
BASOPHILS # BLD AUTO: 0.1 10E3/UL (ref 0–0.2)
BASOPHILS NFR BLD AUTO: 1 %
BILIRUB DIRECT SERPL-MCNC: 0.24 MG/DL (ref 0–0.3)
BILIRUB SERPL-MCNC: 1.4 MG/DL
BILIRUB UR QL STRIP: NEGATIVE
BUN SERPL-MCNC: 11.3 MG/DL (ref 6–20)
CALCIUM SERPL-MCNC: 9.3 MG/DL (ref 8.8–10.4)
CHLORIDE SERPL-SCNC: 103 MMOL/L (ref 98–107)
COLOR UR AUTO: ABNORMAL
CREAT SERPL-MCNC: 0.59 MG/DL (ref 0.51–0.95)
EGFRCR SERPLBLD CKD-EPI 2021: >90 ML/MIN/1.73M2
EOSINOPHIL # BLD AUTO: 0.1 10E3/UL (ref 0–0.7)
EOSINOPHIL NFR BLD AUTO: 1 %
ERYTHROCYTE [DISTWIDTH] IN BLOOD BY AUTOMATED COUNT: 12.1 % (ref 10–15)
EST. AVERAGE GLUCOSE BLD GHB EST-MCNC: 94 MG/DL
GLUCOSE SERPL-MCNC: 103 MG/DL (ref 70–99)
GLUCOSE UR STRIP-MCNC: NEGATIVE MG/DL
HBA1C MFR BLD: 4.9 %
HCG UR QL: NEGATIVE
HCO3 SERPL-SCNC: 25 MMOL/L (ref 22–29)
HCT VFR BLD AUTO: 43.6 % (ref 35–47)
HGB BLD-MCNC: 14.5 G/DL (ref 11.7–15.7)
HGB UR QL STRIP: NEGATIVE
HOLD SPECIMEN: NORMAL
HOLD SPECIMEN: NORMAL
IMM GRANULOCYTES # BLD: 0 10E3/UL
IMM GRANULOCYTES NFR BLD: 0 %
KETONES UR STRIP-MCNC: NEGATIVE MG/DL
LEUKOCYTE ESTERASE UR QL STRIP: ABNORMAL
LIPASE SERPL-CCNC: 42 U/L (ref 13–60)
LYMPHOCYTES # BLD AUTO: 1.7 10E3/UL (ref 0.8–5.3)
LYMPHOCYTES NFR BLD AUTO: 16 %
MAGNESIUM SERPL-MCNC: 2 MG/DL (ref 1.7–2.3)
MCH RBC QN AUTO: 30.7 PG (ref 26.5–33)
MCHC RBC AUTO-ENTMCNC: 33.3 G/DL (ref 31.5–36.5)
MCV RBC AUTO: 92 FL (ref 78–100)
MONOCYTES # BLD AUTO: 0.8 10E3/UL (ref 0–1.3)
MONOCYTES NFR BLD AUTO: 8 %
NEUTROPHILS # BLD AUTO: 7.8 10E3/UL (ref 1.6–8.3)
NEUTROPHILS NFR BLD AUTO: 75 %
NITRATE UR QL: NEGATIVE
NRBC # BLD AUTO: 0 10E3/UL
NRBC BLD AUTO-RTO: 0 /100
PH UR STRIP: 7.5 [PH] (ref 5–7)
PHOSPHATE SERPL-MCNC: 2.9 MG/DL (ref 2.5–4.5)
PLATELET # BLD AUTO: 176 10E3/UL (ref 150–450)
POTASSIUM SERPL-SCNC: 4.1 MMOL/L (ref 3.4–5.3)
PROT SERPL-MCNC: 7.2 G/DL (ref 6.4–8.3)
RBC # BLD AUTO: 4.73 10E6/UL (ref 3.8–5.2)
RBC URINE: 2 /HPF
SODIUM SERPL-SCNC: 138 MMOL/L (ref 135–145)
SP GR UR STRIP: 1.01 (ref 1–1.03)
SQUAMOUS EPITHELIAL: <1 /HPF
UROBILINOGEN UR STRIP-MCNC: NORMAL MG/DL
WBC # BLD AUTO: 10.5 10E3/UL (ref 4–11)
WBC URINE: 12 /HPF

## 2025-05-25 PROCEDURE — 36415 COLL VENOUS BLD VENIPUNCTURE: CPT | Performed by: PHYSICIAN ASSISTANT

## 2025-05-25 PROCEDURE — 81001 URINALYSIS AUTO W/SCOPE: CPT | Performed by: EMERGENCY MEDICINE

## 2025-05-25 PROCEDURE — 82435 ASSAY OF BLOOD CHLORIDE: CPT | Performed by: PHYSICIAN ASSISTANT

## 2025-05-25 PROCEDURE — 258N000003 HC RX IP 258 OP 636: Performed by: STUDENT IN AN ORGANIZED HEALTH CARE EDUCATION/TRAINING PROGRAM

## 2025-05-25 PROCEDURE — 99285 EMERGENCY DEPT VISIT HI MDM: CPT | Mod: 25

## 2025-05-25 PROCEDURE — 96374 THER/PROPH/DIAG INJ IV PUSH: CPT | Mod: 59

## 2025-05-25 PROCEDURE — 74183 MRI ABD W/O CNTR FLWD CNTR: CPT

## 2025-05-25 PROCEDURE — 120N000001 HC R&B MED SURG/OB

## 2025-05-25 PROCEDURE — 250N000011 HC RX IP 250 OP 636: Performed by: PHYSICIAN ASSISTANT

## 2025-05-25 PROCEDURE — 81001 URINALYSIS AUTO W/SCOPE: CPT | Performed by: STUDENT IN AN ORGANIZED HEALTH CARE EDUCATION/TRAINING PROGRAM

## 2025-05-25 PROCEDURE — 84100 ASSAY OF PHOSPHORUS: CPT | Performed by: STUDENT IN AN ORGANIZED HEALTH CARE EDUCATION/TRAINING PROGRAM

## 2025-05-25 PROCEDURE — 85025 COMPLETE CBC W/AUTO DIFF WBC: CPT | Performed by: PHYSICIAN ASSISTANT

## 2025-05-25 PROCEDURE — 87086 URINE CULTURE/COLONY COUNT: CPT | Performed by: EMERGENCY MEDICINE

## 2025-05-25 PROCEDURE — 255N000002 HC RX 255 OP 636: Performed by: STUDENT IN AN ORGANIZED HEALTH CARE EDUCATION/TRAINING PROGRAM

## 2025-05-25 PROCEDURE — 83036 HEMOGLOBIN GLYCOSYLATED A1C: CPT | Performed by: STUDENT IN AN ORGANIZED HEALTH CARE EDUCATION/TRAINING PROGRAM

## 2025-05-25 PROCEDURE — A9585 GADOBUTROL INJECTION: HCPCS | Performed by: STUDENT IN AN ORGANIZED HEALTH CARE EDUCATION/TRAINING PROGRAM

## 2025-05-25 PROCEDURE — 99223 1ST HOSP IP/OBS HIGH 75: CPT | Performed by: STUDENT IN AN ORGANIZED HEALTH CARE EDUCATION/TRAINING PROGRAM

## 2025-05-25 PROCEDURE — 81025 URINE PREGNANCY TEST: CPT | Performed by: PHYSICIAN ASSISTANT

## 2025-05-25 PROCEDURE — 250N000011 HC RX IP 250 OP 636: Mod: JZ | Performed by: STUDENT IN AN ORGANIZED HEALTH CARE EDUCATION/TRAINING PROGRAM

## 2025-05-25 PROCEDURE — 83735 ASSAY OF MAGNESIUM: CPT | Performed by: STUDENT IN AN ORGANIZED HEALTH CARE EDUCATION/TRAINING PROGRAM

## 2025-05-25 PROCEDURE — 83690 ASSAY OF LIPASE: CPT | Performed by: PHYSICIAN ASSISTANT

## 2025-05-25 PROCEDURE — 82248 BILIRUBIN DIRECT: CPT | Performed by: PHYSICIAN ASSISTANT

## 2025-05-25 RX ORDER — ONDANSETRON 4 MG/1
4 TABLET, ORALLY DISINTEGRATING ORAL EVERY 6 HOURS PRN
Status: DISCONTINUED | OUTPATIENT
Start: 2025-05-25 | End: 2025-05-28 | Stop reason: HOSPADM

## 2025-05-25 RX ORDER — NALOXONE HYDROCHLORIDE 0.4 MG/ML
0.2 INJECTION, SOLUTION INTRAMUSCULAR; INTRAVENOUS; SUBCUTANEOUS
Status: DISCONTINUED | OUTPATIENT
Start: 2025-05-25 | End: 2025-05-28 | Stop reason: HOSPADM

## 2025-05-25 RX ORDER — PIPERACILLIN SODIUM, TAZOBACTAM SODIUM 3; .375 G/15ML; G/15ML
3.38 INJECTION, POWDER, LYOPHILIZED, FOR SOLUTION INTRAVENOUS ONCE
Status: COMPLETED | OUTPATIENT
Start: 2025-05-25 | End: 2025-05-25

## 2025-05-25 RX ORDER — METRONIDAZOLE 500 MG/1
500 TABLET ORAL 2 TIMES DAILY
Status: ON HOLD | COMMUNITY
Start: 2025-05-24 | End: 2025-05-28

## 2025-05-25 RX ORDER — HYDROMORPHONE HYDROCHLORIDE 1 MG/ML
0.5 INJECTION, SOLUTION INTRAMUSCULAR; INTRAVENOUS; SUBCUTANEOUS ONCE
Refills: 0 | Status: COMPLETED | OUTPATIENT
Start: 2025-05-25 | End: 2025-05-25

## 2025-05-25 RX ORDER — SODIUM CHLORIDE 9 MG/ML
INJECTION, SOLUTION INTRAVENOUS CONTINUOUS
Status: ACTIVE | OUTPATIENT
Start: 2025-05-25 | End: 2025-05-26

## 2025-05-25 RX ORDER — LIDOCAINE 40 MG/G
CREAM TOPICAL
Status: DISCONTINUED | OUTPATIENT
Start: 2025-05-25 | End: 2025-05-28 | Stop reason: HOSPADM

## 2025-05-25 RX ORDER — HYDROMORPHONE HCL IN WATER/PF 6 MG/30 ML
0.4 PATIENT CONTROLLED ANALGESIA SYRINGE INTRAVENOUS
Refills: 0 | Status: DISCONTINUED | OUTPATIENT
Start: 2025-05-25 | End: 2025-05-28 | Stop reason: HOSPADM

## 2025-05-25 RX ORDER — GADOBUTROL 604.72 MG/ML
5 INJECTION INTRAVENOUS ONCE
Status: COMPLETED | OUTPATIENT
Start: 2025-05-25 | End: 2025-05-25

## 2025-05-25 RX ORDER — ONDANSETRON 2 MG/ML
4 INJECTION INTRAMUSCULAR; INTRAVENOUS ONCE
Status: COMPLETED | OUTPATIENT
Start: 2025-05-25 | End: 2025-05-25

## 2025-05-25 RX ORDER — ONDANSETRON 2 MG/ML
4 INJECTION INTRAMUSCULAR; INTRAVENOUS EVERY 6 HOURS PRN
Status: DISCONTINUED | OUTPATIENT
Start: 2025-05-25 | End: 2025-05-28 | Stop reason: HOSPADM

## 2025-05-25 RX ORDER — ACETAMINOPHEN 650 MG/1
650 SUPPOSITORY RECTAL EVERY 4 HOURS PRN
Status: DISCONTINUED | OUTPATIENT
Start: 2025-05-25 | End: 2025-05-28 | Stop reason: HOSPADM

## 2025-05-25 RX ORDER — AMOXICILLIN 250 MG
2 CAPSULE ORAL 2 TIMES DAILY PRN
Status: DISCONTINUED | OUTPATIENT
Start: 2025-05-25 | End: 2025-05-28 | Stop reason: HOSPADM

## 2025-05-25 RX ORDER — OXYCODONE HYDROCHLORIDE 5 MG/1
5 TABLET ORAL EVERY 4 HOURS PRN
Refills: 0 | Status: DISCONTINUED | OUTPATIENT
Start: 2025-05-25 | End: 2025-05-27

## 2025-05-25 RX ORDER — AMOXICILLIN 250 MG
1 CAPSULE ORAL 2 TIMES DAILY PRN
Status: DISCONTINUED | OUTPATIENT
Start: 2025-05-25 | End: 2025-05-28 | Stop reason: HOSPADM

## 2025-05-25 RX ORDER — CALCIUM CARBONATE 500 MG/1
1000 TABLET, CHEWABLE ORAL 4 TIMES DAILY PRN
Status: DISCONTINUED | OUTPATIENT
Start: 2025-05-25 | End: 2025-05-28 | Stop reason: HOSPADM

## 2025-05-25 RX ORDER — NALOXONE HYDROCHLORIDE 0.4 MG/ML
0.4 INJECTION, SOLUTION INTRAMUSCULAR; INTRAVENOUS; SUBCUTANEOUS
Status: DISCONTINUED | OUTPATIENT
Start: 2025-05-25 | End: 2025-05-28 | Stop reason: HOSPADM

## 2025-05-25 RX ORDER — CEFDINIR 300 MG/1
300 CAPSULE ORAL 2 TIMES DAILY
Status: ON HOLD | COMMUNITY
Start: 2025-05-24 | End: 2025-05-28

## 2025-05-25 RX ORDER — ACETAMINOPHEN 325 MG/1
650 TABLET ORAL EVERY 4 HOURS PRN
Status: DISCONTINUED | OUTPATIENT
Start: 2025-05-25 | End: 2025-05-28 | Stop reason: HOSPADM

## 2025-05-25 RX ORDER — HYDROMORPHONE HCL IN WATER/PF 6 MG/30 ML
0.2 PATIENT CONTROLLED ANALGESIA SYRINGE INTRAVENOUS
Refills: 0 | Status: DISCONTINUED | OUTPATIENT
Start: 2025-05-25 | End: 2025-05-27

## 2025-05-25 RX ORDER — PIPERACILLIN SODIUM, TAZOBACTAM SODIUM 3; .375 G/15ML; G/15ML
3.38 INJECTION, POWDER, LYOPHILIZED, FOR SOLUTION INTRAVENOUS EVERY 6 HOURS
Status: DISCONTINUED | OUTPATIENT
Start: 2025-05-26 | End: 2025-05-28 | Stop reason: HOSPADM

## 2025-05-25 RX ADMIN — PIPERACILLIN AND TAZOBACTAM 3.38 G: 3; .375 INJECTION, POWDER, FOR SOLUTION INTRAVENOUS at 18:38

## 2025-05-25 RX ADMIN — HYDROMORPHONE HYDROCHLORIDE 0.5 MG: 1 INJECTION, SOLUTION INTRAMUSCULAR; INTRAVENOUS; SUBCUTANEOUS at 17:43

## 2025-05-25 RX ADMIN — HYDROMORPHONE HYDROCHLORIDE 0.4 MG: 0.2 INJECTION, SOLUTION INTRAMUSCULAR; INTRAVENOUS; SUBCUTANEOUS at 20:28

## 2025-05-25 RX ADMIN — GADOBUTROL 5 ML: 604.72 INJECTION INTRAVENOUS at 19:59

## 2025-05-25 RX ADMIN — ONDANSETRON 4 MG: 2 INJECTION, SOLUTION INTRAMUSCULAR; INTRAVENOUS at 17:42

## 2025-05-25 RX ADMIN — SODIUM CHLORIDE: 0.9 INJECTION, SOLUTION INTRAVENOUS at 20:27

## 2025-05-25 ASSESSMENT — ACTIVITIES OF DAILY LIVING (ADL)
ADLS_ACUITY_SCORE: 41
ADLS_ACUITY_SCORE: 18
ADLS_ACUITY_SCORE: 41
ADLS_ACUITY_SCORE: 15

## 2025-05-25 ASSESSMENT — COLUMBIA-SUICIDE SEVERITY RATING SCALE - C-SSRS
1. IN THE PAST MONTH, HAVE YOU WISHED YOU WERE DEAD OR WISHED YOU COULD GO TO SLEEP AND NOT WAKE UP?: NO
2. HAVE YOU ACTUALLY HAD ANY THOUGHTS OF KILLING YOURSELF IN THE PAST MONTH?: NO
6. HAVE YOU EVER DONE ANYTHING, STARTED TO DO ANYTHING, OR PREPARED TO DO ANYTHING TO END YOUR LIFE?: NO

## 2025-05-25 NOTE — H&P
[facility]    History and Physical - Hospitalist Service       Date of Admission:  5/25/2025    Assessment & Plan    Assessment:  Nataliya Ayala is a 50 year old female with no significant past medical history presented to the hospital with complaint of right upper quadrant abdominal pain, ultrasound of the abdomen showed acute cholecystitis and possible choledocholithiasis, ED provider spoke to surgery who recommended cholecystectomy and possible intraoperative cholangiogram in the morning, patient received Zosyn, Dilaudid and Zofran in the ED and is going to be admitted for acute cholecystitis and possible choledocholithiasis.    Problem list and plan:  Acute cholecystitis  Possible choledocholithiasis  Mild hyperbilirubinemia  Patient presented to the hospital with complaint of right upper quadrant abdominal pain  Patient was evaluated yesterday at [facility], has had right upper quadrant abdominal pain since Thursday but denies any nausea and vomiting  Had a full workup at [facility] and ultrasound of the abdomen showed cholecystitis and dilation of the common bile duct  GI did not recommend ERCP at that time and wanted patient to be evaluated for possible cholecystectomy however the patient declined and was discharged on oral antibiotics  Now presenting with right upper quadrant abdominal pain again, as per patient has not started taking the antibiotics  Labs significant for mild hyperbilirubinemia  ED provider spoke to surgery who recommended cholecystectomy and possible intraoperative cholangiogram in the morning  Patient received Zosyn, Dilaudid and Zofran in the ED Continue with pain management as per protocol  Continue with antiemetics as appropriate  Continue with broad-spectrum antibiotics  Follow-up GI and surgery for further recommendations  Will keep n.p.o. for now, bowel rest, serial abdominal examination  Trend LFTs  Ordered MRCP, follow-up results    Urinary tract  infection  Urine appears to be infected, urine cultures pending  Currently on Zosyn which hopefully will cover both the intra-abdominal cholecystitis and UTI    Mild hyperglycemia most likely reactive  Follow-up hemoglobin A1c  Monitor blood sugar level intermittently    DVT PPx  Intermittent pneumatic compression    CODE STATUS  Full code as per discussion with the patient        Diet: NPO for Procedure/Surgery per Anesthesia Guidelines Except for: Meds; Clear liquids before procedure/surgery: ADULT (Age GREATER than or Equal to 18 years) - Clear liquids 2 hours before procedure/surgery    DVT Prophylaxis: Pneumatic Compression Devices  Nugent Catheter: Not present  Lines: None     Cardiac Monitoring: None  Code Status: Full Code  Mental status: Patient is alert awake and oriented x 3, patient is a good Historian and most of the history was obtained from the patient, some history was obtained from chart review and the rest of the history was obtained from discussion with the ER physician  Clinically Significant Risk Factors Present on Admission                                      Disposition Plan     Medically Ready for Discharge: Anticipated in 2-4 Days     Saad J. Gondal, MD  Hospitalist Service  [facility]  Securely message with Slide (more info)  Text page via [employer] Paging/Directory     ______________________________________________________________________    Chief Complaint   Right upper quadrant abdominal pain    History is obtained from the patient and chart review    History of Present Illness   Nataliya Ayala is a 50 year old female with no significant past medical history presented to the hospital with complaint of right upper quadrant abdominal pain, patient was evaluated yesterday at [facility], has had right upper quadrant abdominal pain since Thursday but denies any nausea and vomiting, had a full workup at [facility] and ultrasound of the abdomen showed cholecystitis and  dilation of the common bile duct, GI did not recommend ERCP at that time and wanted patient to be evaluated for possible cholecystectomy however the patient declined and was discharged on oral antibiotics, now presenting with right upper quadrant abdominal pain again, as per patient has not started taking the antibiotics, vitals in the ER fairly within normal limits, labs significant for mild hyperbilirubinemia, mild hyperglycemia, urine appears to be infected, urine cultures pending, ED provider spoke to surgery who recommended cholecystectomy and possible intraoperative cholangiogram in the morning, patient received Zosyn, Dilaudid and Zofran in the ED and is going to be admitted for acute cholecystitis and possible choledocholithiasis.      Past Medical History    Past Medical History:   Diagnosis Date    Hearing problem        Past Surgical History   Past Surgical History:   Procedure Laterality Date     SECTION N/A 2017    Procedure: REPEAT  SECTION  ;  Surgeon: Kimberly Reyes MD;  Location: [facility];  Service:     DECOMPRESSION CUBITAL TUNNEL Left 2018    Procedure: DECOMPRESSION CUBITAL TUNNEL;  Left Cubital Tunnel Release, Possible Ulnar Nerve Transposition, Left Carpal Tunnel Release;  Surgeon: Marcos Saucedo MD;  [facility]    RELEASE CARPAL TUNNEL Right 2018    Procedure: RELEASE CARPAL TUNNEL;;  Surgeon: Marcos Saucedo MD;  [facility]    RELEASE CARPAL TUNNEL Left 2018    Procedure: RELEASE CARPAL TUNNEL;;  Surgeon: Marcos Saucedo MD;  [facility]    RELEASE ULNAR NERVE (ELBOW) Right 2018    Procedure: RELEASE ULNAR NERVE (ELBOW);;  Surgeon: Marcos Saucedo MD;  Location:  OR    [facility]  DELIVERY ONLY      Description:  Section;  Recorded: 2012;  Comments: x 2       Prior to Admission Medications   Prior to Admission Medications   Prescriptions Last Dose Informant Patient Reported? Taking?    cefdinir (OMNICEF) 300 MG capsule 5/24/2025  Yes Yes   Sig: Take 300 mg by mouth 2 times daily.   metroNIDAZOLE (FLAGYL) 500 MG tablet 5/24/2025  Yes Yes   Sig: Take 500 mg by mouth 2 times daily.      Facility-Administered Medications: None        Review of Systems    The 10 point Review of Systems is negative other than noted in the HPI or here.  Right upper quadrant abdominal pain    Physical Exam   Vital Signs: Temp: 98.8  F (37.1  C) Temp src: Oral BP: 119/59 Pulse: 57   Resp: 18 SpO2: 98 %      Weight: 119 lbs 9.6 oz    GENERAL: Patient was seen and examined at bedside with no acute distress  HENT:  Head is normocephalic, atraumatic.   EYES:  Eyes have anicteric sclerae without conjunctival injection   LUNGS: Bilateral air entry, clear to auscultation bilaterally  CARDIOVASCULAR:  Regular rate and rhythm with no murmurs, gallops or rubs.  ABDOMEN:  Normal bowel sounds. Soft; tenderness to palpation in the right upper quadrant of the abdomen  EXT: no edema    SKIN:  No acute rashes.   NEUROLOGIC:  Grossly nonfocal.      Medical Decision Making       80 MINUTES SPENT BY ME on the date of service doing chart review, history, exam, documentation & further activities per the note.      Data     I have personally reviewed the following data over the past 24 hrs:    10.5  \   14.5   / 176     138 103 11.3 /  103 (H)   4.1 25 0.59 \     ALT: 15 AST: 22 AP: 71 TBILI: 1.4 (H)   ALB: 4.2 TOT PROTEIN: 7.2 LIPASE: 42     TSH: N/A T4: N/A A1C: 4.9       Imaging results reviewed over the past 24 hrs:   No results found for this or any previous visit (from the past 24 hours).

## 2025-05-25 NOTE — PHARMACY-ADMISSION MEDICATION HISTORY
Pharmacy Intern Admission Medication History    Admission medication history is complete. The information provided in this note is only as accurate as the sources available at the time of the update.    Information Source(s): Patient and CareEverywhere/SureScripts via in-person    Pertinent Information: Patient recalls starting/taking both oral antibiotics for cholecystitis yesterday (10-day duration).    Changes made to PTA medication list:  Added: cefdinir, metronidazole  Deleted: oxycodone, Norco  Changed: None    Allergies reviewed with patient and updates made in EHR: yes    Medication History Completed By: ADWOA LEIGH 5/25/2025 6:26 PM    PTA Med List   Medication Sig Last Dose/Taking    cefdinir (OMNICEF) 300 MG capsule Take 300 mg by mouth 2 times daily. 5/24/2025 Evening    metroNIDAZOLE (FLAGYL) 500 MG tablet Take 500 mg by mouth 2 times daily. 5/24/2025 Evening

## 2025-05-25 NOTE — ED TRIAGE NOTES
Pt reports abdominal pain (worse on the right) since Thursday. Denies n/v/d or urinary symptoms.      Triage Assessment (Adult)       Row Name 05/25/25 4215          Triage Assessment    Airway WDL WDL        Respiratory WDL    Respiratory WDL WDL        Skin Circulation/Temperature WDL    Skin Circulation/Temperature WDL WDL        Cardiac WDL    Cardiac WDL WDL        Peripheral/Neurovascular WDL    Peripheral Neurovascular WDL WDL        Cognitive/Neuro/Behavioral WDL    Cognitive/Neuro/Behavioral WDL WDL

## 2025-05-25 NOTE — ED PROVIDER NOTES
EMERGENCY DEPARTMENT ENCOUNTER      NAME: Nataliya Ayala  AGE: 50 year old female  YOB: 1975  MRN: [id number]  EVALUATION DATE & TIME: No admission date for patient encounter.    PCP: System, Provider Not In    ED PROVIDER: Dariel Duran PA-C      Chief Complaint   Patient presents with    Abdominal Pain         FINAL IMPRESSION:  1. RUQ abdominal pain    2. Acute cholecystitis        ED COURSE & MEDICAL DECISION MAKING:    Pertinent Labs & Imaging studies reviewed. (See chart for details)  4:06 PM I met the patient and performed my initial interview and exam.   4:24 PM Patient seen and reevaluated, is interested in cholecystectomy now, NPO as of 1pm.   6:10 PM Spoke with Dr. Gaines, recommends admit with likely cholecystectomy and possible cholangiogram tomorrow.   6:15 PM Staffed with Dr. Goodman   6:16 PM Patient updated on plan for admit  6:27 PM Spoke with  [facility] medicine who accepts patient for admit        50 year old female presents to the Emergency Department for evaluation of right upper quadrant abdominal pain.     ED Course as of 05/25/25 1827   Sun May 25, 2025   1605 Patient is a 50-year-old female, no contributory past medical history, presents emergency department for evaluation of right upper quadrant abdominal pain.  Patient reports has been ongoing since Thursday.  Denies any nausea vomiting or diarrhea.  Pain is all right upper quadrant.  Denies any urinary symptoms.  On exam, has right upper quadrant abdominal tenderness without rebound or guarding, the remainder the abdomen is soft and nontender, no CVA tenderness.  She has no pain with urination or blood in her urine.  No history of kidney stones.  Does not drink alcohol.  Broad differential considered here including cholecystitis, choledocholithiasis, less likely to be pancreatitis, nephrolithiasis or acute cystitis.  Plan for basic labs including CBC, BMP, hepatic function, lipase, ultrasound of the  right upper quadrant, urinalysis to be collected, patient be given some Zofran Dilaudid here for pain control.   1655 HCG Qual Urine: Negative   1739 WBC: 10.5   1815     Spoke with general surgery who reviewed patient's imaging and laboratory studies, recommends admission with antibiotics here tonight, plan for cholecystectomy tomorrow with possible cholangiogram.  Patient updated on the plan.  Will be admitted at this time, given IV antibiotics.  N.p.o. at midnight for surgery tomorrow.   1827     Spoke with hospital medicine who accepts patient for admission.  Will receive Zosyn here in the emergency department.  Otherwise well-appearing.  Plan for n.p.o. at midnight, cholecystectomy tomorrow.       Medical Decision Making  I reviewed the EMR: Outpatient Record: [facility]    MIPS (CTPE, Dental pain, Nugent, Sinusitis, Asthma/COPD, Head Trauma): Not Applicable    SEPSIS: None    At the conclusion of the encounter I discussed the results of all of the tests and the disposition. The questions were answered. The patient or family acknowledged understanding and was agreeable with the care plan.       0 minutes of critical care time     MEDICATIONS GIVEN IN THE EMERGENCY:  Medications   piperacillin-tazobactam (ZOSYN) 3.375 g vial to attach to  mL bag (has no administration in time range)   HYDROmorphone (PF) (DILAUDID) injection 0.5 mg (0.5 mg Intravenous $Given 5/25/25 1743)   ondansetron (ZOFRAN) injection 4 mg (4 mg Intravenous $Given 5/25/25 1742)       NEW PRESCRIPTIONS STARTED AT TODAY'S ER VISIT  New Prescriptions    No medications on file          =================================================================    HPI    Patient information was obtained from: Patient     Use of : N/A         Nataliya Ayala is a 50 year old female with a pertinent history of alpha thalassemia trait, known cholecystitis who presents to this ED for evaluation of right  upper quadrant abdominal pain.  Patient was evaluated at [facility] yesterday, she did not share this during my initial examination, complains of right upper quadrant abdominal pain has been ongoing since Thursday.  Denies any nausea or vomiting.  Denies urinary symptoms.    Per Chart Review from [facility] on 05/24/2025:1. Cholelithiasis with gallbladder distention and diffuse gallbladder wall thickening. Findings are suspicious for developing acute cholecystitis.  2. Dilatation of the common bile duct measuring up to 12 mm in diameter. Recommend correlation with clinical and laboratory values. MRCP is suggested to further evaluate for choledocholithiasis.  [HG]   0953 I spoke with GI- patient does not meet criteria to go for an ERCP directly. They do not feel that an MRCP would change much at this point methods given her old imaging compared to today's imaging. They recommend that patient get a cholecystectomy. However patient has declined this in the past, so would need to get her agreement to this plan. If she again declines the recommended treatment, and then had worsening page GI again for an alternative plan. [HG]   0954 I spoke to the patient and her . Patient reports she has heard from several friends about having issues after a cholecystectomy and that she needs her gallbladder in order to digest food. Unless we can guarantee that she will have no complications post cholecystectomy, she does not want the surgery. We discussed that she is high-risk for recurrent cholecystitis, and the standard medical care in this situation is for cholecystectomy. Discussed she is high risk for complications without cholecystectomy. Patient still declines this procedure. Will plan to consult GI again. [HG]   0957 GI will consult biliary doctor and call back [HG]   1007 ATTENDING: I personally saw the patient, performed key elements of the visit, and supervised patient care with the primary clinician. MDM: pt here with ALEXANDRE  pain  [CS]   1058 GI- heard back from staff. MRCP would not add much, unlikely to get EUS done today. They recommend treat with outpatient antibiotics with clear risk discussion (perf, complication, blocking, etc). Could follow up  with GI but would need pre op visit done with PCP this week for possible EUS at  appointment. Antibiotic vantin and flagyl. [HG]   1106 I discussed all this with the patient. She still declines cholecystectomy. She understands the risks of proceeding with outpatient antibiotic and follow up. Have asked our  to help her get a preop visit in the next week. Recommended alternating Tylenol and ibuprofen for pain at home. All questions answered         US as follows: 1.  Cholelithiasis with gallbladder distention and diffuse gallbladder wall thickening. Findings are suspicious for developing acute cholecystitis.   2.  Dilatation of the common bile duct measuring up to 12 mm in diameter. Recommend correlation with clinical and laboratory values. MRCP is suggested to further evaluate for choledocholithiasis.     PAST MEDICAL HISTORY:  Past Medical History:   Diagnosis Date    Hearing problem        PAST SURGICAL HISTORY:  Past Surgical History:   Procedure Laterality Date     SECTION N/A 2017    Procedure: REPEAT  SECTION  ;  Surgeon: Kimberly Reyes MD;  Location: [facility];  Service:     DECOMPRESSION CUBITAL TUNNEL Left 2018    Procedure: DECOMPRESSION CUBITAL TUNNEL;  Left Cubital Tunnel Release, Possible Ulnar Nerve Transposition, Left Carpal Tunnel Release;  Surgeon: Marcos Saucedo MD;  [facility]    RELEASE CARPAL TUNNEL Right 2018    Procedure: RELEASE CARPAL TUNNEL;;  Surgeon: Marcos Saucedo MD;  [facility]    RELEASE CARPAL TUNNEL Left 2018    Procedure: RELEASE CARPAL TUNNEL;;  Surgeon: Marcos Saucedo MD;  [facility]    RELEASE ULNAR NERVE (ELBOW) Right 2018    Procedure:  "RELEASE ULNAR NERVE (ELBOW);;  Surgeon: Marcos Saucedo MD;  Location:  OR    [facility]  DELIVERY ONLY      Description:  Section;  Recorded: 2012;  Comments: x 2           CURRENT MEDICATIONS:    cefdinir (OMNICEF) 300 MG capsule  metroNIDAZOLE (FLAGYL) 500 MG tablet         ALLERGIES:  Allergies   Allergen Reactions    Blood-Group Specific Substance      Anti Issue antibody present       FAMILY HISTORY:  Family History   Problem Relation Age of Onset    Cerebrovascular Disease Mother     Cancer Sister 50.00        ovarian       SOCIAL HISTORY:   Social History     Socioeconomic History    Marital status:    Tobacco Use    Smoking status: Never    Smokeless tobacco: Never   Substance and Sexual Activity    Alcohol use: No    Drug use: No    Sexual activity: Yes     Partners: Male     Birth control/protection: Condom, None     Social Drivers of Health     Food Insecurity: No Food Insecurity (2023)    Received from [employer]    Hunger Vital Sign     Worried About Running Out of Food in the Last Year: Never true     Ran Out of Food in the Last Year: Never true   Interpersonal Safety: Not At Risk (2025)    Received from [employer]    Humiliation, Afraid, Rape, and Kick questionnaire     Fear of Current or Ex-Partner: No     Emotionally Abused: No     Physically Abused: No     Sexually Abused: No       VITALS:  /59   Pulse 57   Temp 98.8  F (37.1  C) (Oral)   Resp 18   Ht 1.499 m (4' 11\")   Wt 54.3 kg (119 lb 9.6 oz)   LMP 10/01/2021   SpO2 98%   BMI 24.16 kg/m      PHYSICAL EXAM    Physical Exam  Vitals and nursing note reviewed.   Constitutional:       General: She is not in acute distress.     Appearance: Normal appearance. She is normal weight. She is not toxic-appearing or diaphoretic.   HENT:      Right Ear: External ear normal.      Left Ear: External ear normal.   Eyes:      Conjunctiva/sclera: Conjunctivae normal.   Cardiovascular:      Rate and " Rhythm: Normal rate and regular rhythm.   Pulmonary:      Effort: Pulmonary effort is normal.   Abdominal:      General: Abdomen is flat. There is no distension.      Tenderness: There is abdominal tenderness in the right upper quadrant. There is no right CVA tenderness, left CVA tenderness or rebound.   Neurological:      General: No focal deficit present.      Mental Status: She is alert. Mental status is at baseline.           LAB:  All pertinent labs reviewed and interpreted.  Labs Ordered and Resulted from Time of ED Arrival to Time of ED Departure   ROUTINE UA WITH MICROSCOPIC REFLEX TO CULTURE - Abnormal       Result Value    Color Urine Light Yellow      Appearance Urine Turbid (*)     Glucose Urine Negative      Bilirubin Urine Negative      Ketones Urine Negative      Specific Gravity Urine 1.013      Blood Urine Negative      pH Urine 7.5 (*)     Protein Albumin Urine Negative      Urobilinogen Urine Normal      Nitrite Urine Negative      Leukocyte Esterase Urine 250 Mónica/uL (*)     Amorphous Crystals Urine Few (*)     RBC Urine 2      WBC Urine 12 (*)     Squamous Epithelials Urine <1     BASIC METABOLIC PANEL - Abnormal    Sodium 138      Potassium 4.1      Chloride 103      Carbon Dioxide (CO2) 25      Anion Gap 10      Urea Nitrogen 11.3      Creatinine 0.59      GFR Estimate >90      Calcium 9.3      Glucose 103 (*)    HEPATIC FUNCTION PANEL - Abnormal    Protein Total 7.2      Albumin 4.2      Bilirubin Total 1.4 (*)     Alkaline Phosphatase 71      AST 22      ALT 15      Bilirubin Direct 0.24     HCG QUALITATIVE URINE - Normal    hCG Urine Qualitative Negative     LIPASE - Normal    Lipase 42     CBC WITH PLATELETS AND DIFFERENTIAL    WBC Count 10.5      RBC Count 4.73      Hemoglobin 14.5      Hematocrit 43.6      MCV 92      MCH 30.7      MCHC 33.3      RDW 12.1      Platelet Count 176      % Neutrophils 75      % Lymphocytes 16      % Monocytes 8      % Eosinophils 1      % Basophils 1      %  Immature Granulocytes 0      NRBCs per 100 WBC 0      Absolute Neutrophils 7.8      Absolute Lymphocytes 1.7      Absolute Monocytes 0.8      Absolute Eosinophils 0.1      Absolute Basophils 0.1      Absolute Immature Granulocytes 0.0      Absolute NRBCs 0.0     URINE CULTURE       RADIOLOGY:  Reviewed all pertinent imaging. Please see official radiology report.  No orders to display     PROCEDURES:   None.     Dariel Duran PA-C  Emergency Medicine  [facility] EMERGENCY DEPARTMENT  [facility] [facility] [id number] [phone]  Dept: [phone]       Dariel Duran PA-C  05/25/25 3244

## 2025-05-25 NOTE — ED PROVIDER NOTES
"Emergency Department Midlevel Supervisory Note     I had a face to face encounter with this patient seen by the Advanced Practice Provider (DALILA). I personally made/approved the management plan and take responsibility for the patient management. I personally saw patient and performed a substantive portion of the visit including all aspects of the medical decision making.     ED Course:  6:15 PM Iraj Duran PA-C staffed patient with me. I agree with their assessment and plan of management, and I will see the patient.  6:16 PM I met with the patient to introduce myself, gather additional history, perform my initial exam, and discuss the plan.     Brief HPI:     Nataliya Ayala is a 50 year old female who presents for evaluation of right upper quadrant abdominal pain. Patient was evaluated at [facility] yesterday. Endorses right upper quadrant abdominal pain has been ongoing since Thursday (5/22/25). Denies any nausea or vomiting. Denies urinary symptoms.     I, Dieudonne Chavez, am serving as a scribe to document services personally performed by Lance Goodman DO, based on my observations and the provider's statements to me.   I, Lance Goodman DO, attest that Dieudonne Chavez was acting in a scribe capacity, has observed my performance of the services and has documented them in accordance with my direction.    Brief Physical Exam: /59   Pulse 57   Temp 98.8  F (37.1  C) (Oral)   Resp 18   Ht 1.499 m (4' 11\")   Wt 54.3 kg (119 lb 9.6 oz)   LMP 10/01/2021   SpO2 98%   BMI 24.16 kg/m    Physical Exam  Vitals and nursing note reviewed.   Constitutional:       General: She is not in acute distress.     Appearance: Normal appearance.   HENT:      Head: Normocephalic and atraumatic.      Nose: Nose normal.      Mouth/Throat:      Mouth: Mucous membranes are moist.   Eyes:      Extraocular Movements: Extraocular movements intact.   Cardiovascular:      Rate and Rhythm: Normal rate and regular rhythm.      Pulses: " "Normal pulses.           Radial pulses are 2+ on the right side and 2+ on the left side.        Dorsalis pedis pulses are 2+ on the right side and 2+ on the left side.   Pulmonary:      Effort: Pulmonary effort is normal.   Abdominal:      Tenderness: There is abdominal tenderness in the right upper quadrant. There is no right CVA tenderness or left CVA tenderness. Positive signs include Lancaster's sign. Negative signs include Rovsing's sign and McBurney's sign.   Skin:     Findings: No rash.   Neurological:      General: No focal deficit present.      Mental Status: She is alert. Mental status is at baseline.      Comments: Fluent speech   Psychiatric:         Mood and Affect: Mood normal.         Behavior: Behavior normal.            MDM:  Pt seen at [facility] yesterday for RUQ abd pain with normal labs, but US showing likely \"early cholecystitis\".  Pt offered cholecystectomy, but ultimately left.  Pt returns with continued pain. Nontoxic, but clearly uncomfortable with palpation, consistent with hepatobiliary disease.  Labs remain well here. Surgery consulted and will plan for cholecystectomy, Zosyn in ED.        1. RUQ abdominal pain    2. Acute cholecystitis        Consults:  Gen Surgery    Labs and Imaging:  Results for orders placed or performed during the hospital encounter of 05/25/25   UA with Microscopic reflex to Culture    Specimen: Urine, Midstream   Result Value Ref Range    Color Urine Light Yellow Colorless, Straw, Light Yellow, Yellow    Appearance Urine Turbid (A) Clear    Glucose Urine Negative Negative mg/dL    Bilirubin Urine Negative Negative    Ketones Urine Negative Negative mg/dL    Specific Gravity Urine 1.013 1.001 - 1.030    Blood Urine Negative Negative    pH Urine 7.5 (H) 5.0 - 7.0    Protein Albumin Urine Negative Negative mg/dL    Urobilinogen Urine Normal Normal mg/dL    Nitrite Urine Negative Negative    Leukocyte Esterase Urine 250 Mónica/uL (A) Negative    Amorphous Crystals Urine Few " (A) None Seen /HPF    RBC Urine 2 <=2 /HPF    WBC Urine 12 (H) <=5 /HPF    Squamous Epithelials Urine <1 <=1 /HPF   HCG qualitative urine   Result Value Ref Range    hCG Urine Qualitative Negative Negative   Basic metabolic panel   Result Value Ref Range    Sodium 138 135 - 145 mmol/L    Potassium 4.1 3.4 - 5.3 mmol/L    Chloride 103 98 - 107 mmol/L    Carbon Dioxide (CO2) 25 22 - 29 mmol/L    Anion Gap 10 7 - 15 mmol/L    Urea Nitrogen 11.3 6.0 - 20.0 mg/dL    Creatinine 0.59 0.51 - 0.95 mg/dL    GFR Estimate >90 >60 mL/min/1.73m2    Calcium 9.3 8.8 - 10.4 mg/dL    Glucose 103 (H) 70 - 99 mg/dL   Hepatic function panel   Result Value Ref Range    Protein Total 7.2 6.4 - 8.3 g/dL    Albumin 4.2 3.5 - 5.2 g/dL    Bilirubin Total 1.4 (H) <=1.2 mg/dL    Alkaline Phosphatase 71 40 - 150 U/L    AST 22 0 - 45 U/L    ALT 15 0 - 50 U/L    Bilirubin Direct 0.24 0.00 - 0.30 mg/dL   Result Value Ref Range    Lipase 42 13 - 60 U/L   CBC with platelets and differential   Result Value Ref Range    WBC Count 10.5 4.0 - 11.0 10e3/uL    RBC Count 4.73 3.80 - 5.20 10e6/uL    Hemoglobin 14.5 11.7 - 15.7 g/dL    Hematocrit 43.6 35.0 - 47.0 %    MCV 92 78 - 100 fL    MCH 30.7 26.5 - 33.0 pg    MCHC 33.3 31.5 - 36.5 g/dL    RDW 12.1 10.0 - 15.0 %    Platelet Count 176 150 - 450 10e3/uL    % Neutrophils 75 %    % Lymphocytes 16 %    % Monocytes 8 %    % Eosinophils 1 %    % Basophils 1 %    % Immature Granulocytes 0 %    NRBCs per 100 WBC 0 <1 /100    Absolute Neutrophils 7.8 1.6 - 8.3 10e3/uL    Absolute Lymphocytes 1.7 0.8 - 5.3 10e3/uL    Absolute Monocytes 0.8 0.0 - 1.3 10e3/uL    Absolute Eosinophils 0.1 0.0 - 0.7 10e3/uL    Absolute Basophils 0.1 0.0 - 0.2 10e3/uL    Absolute Immature Granulocytes 0.0 <=0.4 10e3/uL    Absolute NRBCs 0.0 10e3/uL       I have reviewed the relevant laboratory studies above.    I independently interpreted the following imaging study(s):       EKG: I reviewed and independently interpreted the patient's  EKG, with comments made as listed below. Please see scanned EKG for full report.   none    Procedures:  I was present for the key portions of procedures documented in DALILA/midlevel note, see midlevel note for further details.    Lance Goodman [facility] EMERGENCY DEPARTMENT  [address] [place] [id number]  [phone]     Lance Goodman MD  05/25/25 [id number]

## 2025-05-26 ENCOUNTER — ANESTHESIA EVENT (OUTPATIENT)
Dept: SURGERY | Facility: HOSPITAL | Age: 50
End: 2025-05-26

## 2025-05-26 ENCOUNTER — ANESTHESIA (OUTPATIENT)
Dept: SURGERY | Facility: HOSPITAL | Age: 50
End: 2025-05-26

## 2025-05-26 LAB
ALBUMIN SERPL BCG-MCNC: 4 G/DL (ref 3.5–5.2)
ALP SERPL-CCNC: 68 U/L (ref 40–150)
ALT SERPL W P-5'-P-CCNC: 13 U/L (ref 0–50)
ANION GAP SERPL CALCULATED.3IONS-SCNC: 9 MMOL/L (ref 7–15)
AST SERPL W P-5'-P-CCNC: 12 U/L (ref 0–45)
BILIRUB DIRECT SERPL-MCNC: 0.4 MG/DL (ref 0–0.3)
BILIRUB SERPL-MCNC: 1.9 MG/DL
BUN SERPL-MCNC: 10.9 MG/DL (ref 6–20)
CALCIUM SERPL-MCNC: 8.9 MG/DL (ref 8.8–10.4)
CHLORIDE SERPL-SCNC: 106 MMOL/L (ref 98–107)
CREAT SERPL-MCNC: 0.64 MG/DL (ref 0.51–0.95)
EGFRCR SERPLBLD CKD-EPI 2021: >90 ML/MIN/1.73M2
ERYTHROCYTE [DISTWIDTH] IN BLOOD BY AUTOMATED COUNT: 12.3 % (ref 10–15)
GLUCOSE SERPL-MCNC: 100 MG/DL (ref 70–99)
HCO3 SERPL-SCNC: 26 MMOL/L (ref 22–29)
HCT VFR BLD AUTO: 41.8 % (ref 35–47)
HGB BLD-MCNC: 13.6 G/DL (ref 11.7–15.7)
MAGNESIUM SERPL-MCNC: 2 MG/DL (ref 1.7–2.3)
MCH RBC QN AUTO: 30.2 PG (ref 26.5–33)
MCHC RBC AUTO-ENTMCNC: 32.5 G/DL (ref 31.5–36.5)
MCV RBC AUTO: 93 FL (ref 78–100)
PHOSPHATE SERPL-MCNC: 3.3 MG/DL (ref 2.5–4.5)
PLATELET # BLD AUTO: 177 10E3/UL (ref 150–450)
POTASSIUM SERPL-SCNC: 4 MMOL/L (ref 3.4–5.3)
PROT SERPL-MCNC: 6.9 G/DL (ref 6.4–8.3)
RBC # BLD AUTO: 4.51 10E6/UL (ref 3.8–5.2)
SODIUM SERPL-SCNC: 141 MMOL/L (ref 135–145)
WBC # BLD AUTO: 10.4 10E3/UL (ref 4–11)

## 2025-05-26 PROCEDURE — 47562 LAPAROSCOPIC CHOLECYSTECTOMY: CPT | Performed by: SURGERY

## 2025-05-26 PROCEDURE — 258N000003 HC RX IP 258 OP 636

## 2025-05-26 PROCEDURE — 0FT44ZZ RESECTION OF GALLBLADDER, PERCUTANEOUS ENDOSCOPIC APPROACH: ICD-10-PCS | Performed by: SURGERY

## 2025-05-26 PROCEDURE — 710N000009 HC RECOVERY PHASE 1, LEVEL 1, PER MIN: Performed by: SURGERY

## 2025-05-26 PROCEDURE — 250N000013 HC RX MED GY IP 250 OP 250 PS 637: Performed by: SURGERY

## 2025-05-26 PROCEDURE — 272N000001 HC OR GENERAL SUPPLY STERILE: Performed by: SURGERY

## 2025-05-26 PROCEDURE — 250N000013 HC RX MED GY IP 250 OP 250 PS 637: Performed by: ANESTHESIOLOGY

## 2025-05-26 PROCEDURE — 370N000017 HC ANESTHESIA TECHNICAL FEE, PER MIN: Performed by: SURGERY

## 2025-05-26 PROCEDURE — 88304 TISSUE EXAM BY PATHOLOGIST: CPT | Mod: 26 | Performed by: PATHOLOGY

## 2025-05-26 PROCEDURE — 250N000011 HC RX IP 250 OP 636: Performed by: SURGERY

## 2025-05-26 PROCEDURE — 83735 ASSAY OF MAGNESIUM: CPT | Performed by: STUDENT IN AN ORGANIZED HEALTH CARE EDUCATION/TRAINING PROGRAM

## 2025-05-26 PROCEDURE — 250N000009 HC RX 250: Performed by: SURGERY

## 2025-05-26 PROCEDURE — 250N000011 HC RX IP 250 OP 636

## 2025-05-26 PROCEDURE — 360N000076 HC SURGERY LEVEL 3, PER MIN: Performed by: SURGERY

## 2025-05-26 PROCEDURE — 88304 TISSUE EXAM BY PATHOLOGIST: CPT | Mod: TC | Performed by: SURGERY

## 2025-05-26 PROCEDURE — 250N000009 HC RX 250

## 2025-05-26 PROCEDURE — 999N000141 HC STATISTIC PRE-PROCEDURE NURSING ASSESSMENT: Performed by: SURGERY

## 2025-05-26 PROCEDURE — 82248 BILIRUBIN DIRECT: CPT | Performed by: STUDENT IN AN ORGANIZED HEALTH CARE EDUCATION/TRAINING PROGRAM

## 2025-05-26 PROCEDURE — 250N000013 HC RX MED GY IP 250 OP 250 PS 637: Performed by: INTERNAL MEDICINE

## 2025-05-26 PROCEDURE — 258N000003 HC RX IP 258 OP 636: Performed by: SURGERY

## 2025-05-26 PROCEDURE — 85014 HEMATOCRIT: CPT | Performed by: STUDENT IN AN ORGANIZED HEALTH CARE EDUCATION/TRAINING PROGRAM

## 2025-05-26 PROCEDURE — 99222 1ST HOSP IP/OBS MODERATE 55: CPT | Mod: 57 | Performed by: SURGERY

## 2025-05-26 PROCEDURE — 80053 COMPREHEN METABOLIC PANEL: CPT | Performed by: STUDENT IN AN ORGANIZED HEALTH CARE EDUCATION/TRAINING PROGRAM

## 2025-05-26 PROCEDURE — 250N000011 HC RX IP 250 OP 636: Performed by: STUDENT IN AN ORGANIZED HEALTH CARE EDUCATION/TRAINING PROGRAM

## 2025-05-26 PROCEDURE — 36415 COLL VENOUS BLD VENIPUNCTURE: CPT | Performed by: STUDENT IN AN ORGANIZED HEALTH CARE EDUCATION/TRAINING PROGRAM

## 2025-05-26 PROCEDURE — 250N000011 HC RX IP 250 OP 636: Mod: JZ | Performed by: ANESTHESIOLOGY

## 2025-05-26 PROCEDURE — 250N000011 HC RX IP 250 OP 636: Mod: JZ | Performed by: SURGERY

## 2025-05-26 PROCEDURE — 120N000001 HC R&B MED SURG/OB

## 2025-05-26 PROCEDURE — 258N000003 HC RX IP 258 OP 636: Performed by: ANESTHESIOLOGY

## 2025-05-26 PROCEDURE — 99233 SBSQ HOSP IP/OBS HIGH 50: CPT | Performed by: INTERNAL MEDICINE

## 2025-05-26 PROCEDURE — 84100 ASSAY OF PHOSPHORUS: CPT | Performed by: STUDENT IN AN ORGANIZED HEALTH CARE EDUCATION/TRAINING PROGRAM

## 2025-05-26 RX ORDER — HYDROMORPHONE HYDROCHLORIDE 1 MG/ML
0.5 INJECTION, SOLUTION INTRAMUSCULAR; INTRAVENOUS; SUBCUTANEOUS
Status: DISCONTINUED | OUTPATIENT
Start: 2025-05-26 | End: 2025-05-27

## 2025-05-26 RX ORDER — PROPOFOL 10 MG/ML
INJECTION, EMULSION INTRAVENOUS PRN
Status: DISCONTINUED | OUTPATIENT
Start: 2025-05-26 | End: 2025-05-26

## 2025-05-26 RX ORDER — DIPHENHYDRAMINE HYDROCHLORIDE 50 MG/ML
25 INJECTION, SOLUTION INTRAMUSCULAR; INTRAVENOUS EVERY 6 HOURS PRN
Status: DISCONTINUED | OUTPATIENT
Start: 2025-05-26 | End: 2025-05-28 | Stop reason: HOSPADM

## 2025-05-26 RX ORDER — ONDANSETRON 4 MG/1
4 TABLET, ORALLY DISINTEGRATING ORAL EVERY 30 MIN PRN
Status: DISCONTINUED | OUTPATIENT
Start: 2025-05-26 | End: 2025-05-26 | Stop reason: HOSPADM

## 2025-05-26 RX ORDER — HYDROMORPHONE HCL IN WATER/PF 6 MG/30 ML
0.2 PATIENT CONTROLLED ANALGESIA SYRINGE INTRAVENOUS
Status: DISCONTINUED | OUTPATIENT
Start: 2025-05-26 | End: 2025-05-27

## 2025-05-26 RX ORDER — PROPOFOL 10 MG/ML
INJECTION, EMULSION INTRAVENOUS CONTINUOUS PRN
Status: DISCONTINUED | OUTPATIENT
Start: 2025-05-26 | End: 2025-05-26

## 2025-05-26 RX ORDER — ACETAMINOPHEN 325 MG/1
975 TABLET ORAL EVERY 8 HOURS
Status: DISCONTINUED | OUTPATIENT
Start: 2025-05-26 | End: 2025-05-28 | Stop reason: HOSPADM

## 2025-05-26 RX ORDER — DEXAMETHASONE SODIUM PHOSPHATE 4 MG/ML
4 INJECTION, SOLUTION INTRA-ARTICULAR; INTRALESIONAL; INTRAMUSCULAR; INTRAVENOUS; SOFT TISSUE
Status: DISCONTINUED | OUTPATIENT
Start: 2025-05-26 | End: 2025-05-26 | Stop reason: HOSPADM

## 2025-05-26 RX ORDER — AMOXICILLIN 250 MG
1 CAPSULE ORAL 2 TIMES DAILY
Status: DISCONTINUED | OUTPATIENT
Start: 2025-05-26 | End: 2025-05-28 | Stop reason: HOSPADM

## 2025-05-26 RX ORDER — DIPHENHYDRAMINE HCL 25 MG
25 CAPSULE ORAL EVERY 6 HOURS PRN
Status: DISCONTINUED | OUTPATIENT
Start: 2025-05-26 | End: 2025-05-28 | Stop reason: HOSPADM

## 2025-05-26 RX ORDER — OXYCODONE HYDROCHLORIDE 5 MG/1
5 TABLET ORAL EVERY 4 HOURS PRN
Status: DISCONTINUED | OUTPATIENT
Start: 2025-05-26 | End: 2025-05-27

## 2025-05-26 RX ORDER — BISACODYL 10 MG
10 SUPPOSITORY, RECTAL RECTAL DAILY PRN
Status: DISCONTINUED | OUTPATIENT
Start: 2025-05-29 | End: 2025-05-28 | Stop reason: HOSPADM

## 2025-05-26 RX ORDER — HALOPERIDOL 5 MG/ML
1 INJECTION INTRAMUSCULAR
Status: DISCONTINUED | OUTPATIENT
Start: 2025-05-26 | End: 2025-05-26 | Stop reason: HOSPADM

## 2025-05-26 RX ORDER — FENTANYL CITRATE 50 UG/ML
25 INJECTION, SOLUTION INTRAMUSCULAR; INTRAVENOUS EVERY 5 MIN PRN
Refills: 0 | Status: DISCONTINUED | OUTPATIENT
Start: 2025-05-26 | End: 2025-05-26 | Stop reason: HOSPADM

## 2025-05-26 RX ORDER — DEXTROSE MONOHYDRATE, SODIUM CHLORIDE, AND POTASSIUM CHLORIDE 50; 1.49; 4.5 G/1000ML; G/1000ML; G/1000ML
INJECTION, SOLUTION INTRAVENOUS CONTINUOUS
Status: DISCONTINUED | OUTPATIENT
Start: 2025-05-26 | End: 2025-05-26

## 2025-05-26 RX ORDER — LIDOCAINE HYDROCHLORIDE 10 MG/ML
INJECTION, SOLUTION INFILTRATION; PERINEURAL PRN
Status: DISCONTINUED | OUTPATIENT
Start: 2025-05-26 | End: 2025-05-26

## 2025-05-26 RX ORDER — NALOXONE HYDROCHLORIDE 1 MG/ML
0.1 INJECTION INTRAMUSCULAR; INTRAVENOUS; SUBCUTANEOUS
Status: DISCONTINUED | OUTPATIENT
Start: 2025-05-26 | End: 2025-05-26 | Stop reason: HOSPADM

## 2025-05-26 RX ORDER — POLYETHYLENE GLYCOL 3350 17 G/17G
17 POWDER, FOR SOLUTION ORAL DAILY
Status: DISCONTINUED | OUTPATIENT
Start: 2025-05-27 | End: 2025-05-28 | Stop reason: HOSPADM

## 2025-05-26 RX ORDER — NALOXONE HYDROCHLORIDE 0.4 MG/ML
0.1 INJECTION, SOLUTION INTRAMUSCULAR; INTRAVENOUS; SUBCUTANEOUS
Status: DISCONTINUED | OUTPATIENT
Start: 2025-05-26 | End: 2025-05-26 | Stop reason: HOSPADM

## 2025-05-26 RX ORDER — ONDANSETRON 2 MG/ML
4 INJECTION INTRAMUSCULAR; INTRAVENOUS EVERY 30 MIN PRN
Status: DISCONTINUED | OUTPATIENT
Start: 2025-05-26 | End: 2025-05-26 | Stop reason: HOSPADM

## 2025-05-26 RX ORDER — FENTANYL CITRATE 50 UG/ML
50 INJECTION, SOLUTION INTRAMUSCULAR; INTRAVENOUS EVERY 5 MIN PRN
Refills: 0 | Status: DISCONTINUED | OUTPATIENT
Start: 2025-05-26 | End: 2025-05-26 | Stop reason: HOSPADM

## 2025-05-26 RX ORDER — DEXAMETHASONE SODIUM PHOSPHATE 10 MG/ML
4 INJECTION, SOLUTION INTRAMUSCULAR; INTRAVENOUS
Status: DISCONTINUED | OUTPATIENT
Start: 2025-05-26 | End: 2025-05-26 | Stop reason: HOSPADM

## 2025-05-26 RX ORDER — KETOROLAC TROMETHAMINE 15 MG/ML
15 INJECTION, SOLUTION INTRAMUSCULAR; INTRAVENOUS EVERY 6 HOURS
Status: COMPLETED | OUTPATIENT
Start: 2025-05-26 | End: 2025-05-27

## 2025-05-26 RX ORDER — OXYCODONE HYDROCHLORIDE 5 MG/1
10 TABLET ORAL
Status: DISCONTINUED | OUTPATIENT
Start: 2025-05-26 | End: 2025-05-26 | Stop reason: HOSPADM

## 2025-05-26 RX ORDER — DEXAMETHASONE SODIUM PHOSPHATE 10 MG/ML
INJECTION, SOLUTION INTRAMUSCULAR; INTRAVENOUS PRN
Status: DISCONTINUED | OUTPATIENT
Start: 2025-05-26 | End: 2025-05-26

## 2025-05-26 RX ORDER — FENTANYL CITRATE 50 UG/ML
INJECTION, SOLUTION INTRAMUSCULAR; INTRAVENOUS PRN
Status: DISCONTINUED | OUTPATIENT
Start: 2025-05-26 | End: 2025-05-26

## 2025-05-26 RX ORDER — HYDROMORPHONE HCL IN WATER/PF 6 MG/30 ML
0.4 PATIENT CONTROLLED ANALGESIA SYRINGE INTRAVENOUS EVERY 5 MIN PRN
Refills: 0 | Status: DISCONTINUED | OUTPATIENT
Start: 2025-05-26 | End: 2025-05-26 | Stop reason: HOSPADM

## 2025-05-26 RX ORDER — LIDOCAINE 40 MG/G
CREAM TOPICAL
Status: DISCONTINUED | OUTPATIENT
Start: 2025-05-26 | End: 2025-05-28 | Stop reason: HOSPADM

## 2025-05-26 RX ORDER — SODIUM CHLORIDE, SODIUM LACTATE, POTASSIUM CHLORIDE, CALCIUM CHLORIDE 600; 310; 30; 20 MG/100ML; MG/100ML; MG/100ML; MG/100ML
INJECTION, SOLUTION INTRAVENOUS CONTINUOUS PRN
Status: DISCONTINUED | OUTPATIENT
Start: 2025-05-26 | End: 2025-05-26

## 2025-05-26 RX ORDER — BUPIVACAINE HYDROCHLORIDE AND EPINEPHRINE 2.5; 5 MG/ML; UG/ML
INJECTION, SOLUTION EPIDURAL; INFILTRATION; INTRACAUDAL; PERINEURAL PRN
Status: DISCONTINUED | OUTPATIENT
Start: 2025-05-26 | End: 2025-05-26 | Stop reason: HOSPADM

## 2025-05-26 RX ORDER — FAMOTIDINE 20 MG/1
20 TABLET, FILM COATED ORAL 2 TIMES DAILY
Status: DISCONTINUED | OUTPATIENT
Start: 2025-05-26 | End: 2025-05-28 | Stop reason: HOSPADM

## 2025-05-26 RX ORDER — ACETAMINOPHEN 325 MG/1
975 TABLET ORAL ONCE
Status: COMPLETED | OUTPATIENT
Start: 2025-05-26 | End: 2025-05-26

## 2025-05-26 RX ORDER — SODIUM CHLORIDE, SODIUM LACTATE, POTASSIUM CHLORIDE, CALCIUM CHLORIDE 600; 310; 30; 20 MG/100ML; MG/100ML; MG/100ML; MG/100ML
INJECTION, SOLUTION INTRAVENOUS CONTINUOUS
Status: DISCONTINUED | OUTPATIENT
Start: 2025-05-26 | End: 2025-05-26 | Stop reason: HOSPADM

## 2025-05-26 RX ORDER — HYDROMORPHONE HCL IN WATER/PF 6 MG/30 ML
0.2 PATIENT CONTROLLED ANALGESIA SYRINGE INTRAVENOUS EVERY 5 MIN PRN
Refills: 0 | Status: DISCONTINUED | OUTPATIENT
Start: 2025-05-26 | End: 2025-05-26 | Stop reason: HOSPADM

## 2025-05-26 RX ORDER — LIDOCAINE 40 MG/G
CREAM TOPICAL
Status: DISCONTINUED | OUTPATIENT
Start: 2025-05-26 | End: 2025-05-26 | Stop reason: HOSPADM

## 2025-05-26 RX ORDER — SODIUM CHLORIDE, SODIUM LACTATE, POTASSIUM CHLORIDE, AND CALCIUM CHLORIDE .6; .31; .03; .02 G/100ML; G/100ML; G/100ML; G/100ML
IRRIGANT IRRIGATION PRN
Status: DISCONTINUED | OUTPATIENT
Start: 2025-05-26 | End: 2025-05-28 | Stop reason: HOSPADM

## 2025-05-26 RX ORDER — OXYCODONE HYDROCHLORIDE 5 MG/1
10 TABLET ORAL EVERY 4 HOURS PRN
Status: DISCONTINUED | OUTPATIENT
Start: 2025-05-26 | End: 2025-05-27

## 2025-05-26 RX ORDER — OXYCODONE HYDROCHLORIDE 5 MG/1
5 TABLET ORAL
Status: DISCONTINUED | OUTPATIENT
Start: 2025-05-26 | End: 2025-05-26 | Stop reason: HOSPADM

## 2025-05-26 RX ORDER — ONDANSETRON 2 MG/ML
INJECTION INTRAMUSCULAR; INTRAVENOUS PRN
Status: DISCONTINUED | OUTPATIENT
Start: 2025-05-26 | End: 2025-05-26

## 2025-05-26 RX ORDER — ACETAMINOPHEN 325 MG/1
975 TABLET ORAL EVERY 8 HOURS
Status: DISCONTINUED | OUTPATIENT
Start: 2025-05-26 | End: 2025-05-26

## 2025-05-26 RX ORDER — FAMOTIDINE 20 MG/1
20 TABLET, FILM COATED ORAL 2 TIMES DAILY
Status: DISCONTINUED | OUTPATIENT
Start: 2025-05-26 | End: 2025-05-26

## 2025-05-26 RX ADMIN — HYDROMORPHONE HYDROCHLORIDE 0.2 MG: 0.2 INJECTION, SOLUTION INTRAMUSCULAR; INTRAVENOUS; SUBCUTANEOUS at 20:42

## 2025-05-26 RX ADMIN — LIDOCAINE HYDROCHLORIDE 5 ML: 10 INJECTION, SOLUTION INFILTRATION; PERINEURAL at 08:16

## 2025-05-26 RX ADMIN — ACETAMINOPHEN 975 MG: 325 TABLET ORAL at 21:01

## 2025-05-26 RX ADMIN — HYDROMORPHONE HYDROCHLORIDE 0.5 MG: 1 INJECTION, SOLUTION INTRAMUSCULAR; INTRAVENOUS; SUBCUTANEOUS at 08:34

## 2025-05-26 RX ADMIN — ACETAMINOPHEN 975 MG: 325 TABLET ORAL at 07:10

## 2025-05-26 RX ADMIN — KETOROLAC TROMETHAMINE 15 MG: 15 INJECTION, SOLUTION INTRAMUSCULAR; INTRAVENOUS at 11:32

## 2025-05-26 RX ADMIN — HYDROMORPHONE HYDROCHLORIDE 0.4 MG: 0.2 INJECTION, SOLUTION INTRAMUSCULAR; INTRAVENOUS; SUBCUTANEOUS at 05:22

## 2025-05-26 RX ADMIN — SUGAMMADEX 100 MG: 100 INJECTION, SOLUTION INTRAVENOUS at 09:32

## 2025-05-26 RX ADMIN — PROPOFOL 150 MCG/KG/MIN: 10 INJECTION, EMULSION INTRAVENOUS at 08:17

## 2025-05-26 RX ADMIN — KETOROLAC TROMETHAMINE 15 MG: 15 INJECTION, SOLUTION INTRAMUSCULAR; INTRAVENOUS at 23:59

## 2025-05-26 RX ADMIN — FAMOTIDINE 20 MG: 10 INJECTION, SOLUTION INTRAVENOUS at 11:32

## 2025-05-26 RX ADMIN — HYDROMORPHONE HYDROCHLORIDE 0.4 MG: 0.2 INJECTION, SOLUTION INTRAMUSCULAR; INTRAVENOUS; SUBCUTANEOUS at 01:42

## 2025-05-26 RX ADMIN — DEXAMETHASONE SODIUM PHOSPHATE 5 MG: 10 INJECTION, SOLUTION INTRAMUSCULAR; INTRAVENOUS at 08:20

## 2025-05-26 RX ADMIN — FAMOTIDINE 20 MG: 20 TABLET, FILM COATED ORAL at 20:34

## 2025-05-26 RX ADMIN — SENNOSIDES AND DOCUSATE SODIUM 1 TABLET: 50; 8.6 TABLET ORAL at 20:35

## 2025-05-26 RX ADMIN — ROCURONIUM 10 MG: 50 INJECTION, SOLUTION INTRAVENOUS at 08:38

## 2025-05-26 RX ADMIN — SODIUM CHLORIDE 8 MCG: 9 INJECTION, SOLUTION INTRAVENOUS at 08:38

## 2025-05-26 RX ADMIN — HYDROMORPHONE HYDROCHLORIDE 0.5 MG: 1 INJECTION, SOLUTION INTRAMUSCULAR; INTRAVENOUS; SUBCUTANEOUS at 09:39

## 2025-05-26 RX ADMIN — PIPERACILLIN AND TAZOBACTAM 3.38 G: 3; .375 INJECTION, POWDER, FOR SOLUTION INTRAVENOUS at 18:11

## 2025-05-26 RX ADMIN — MIDAZOLAM HYDROCHLORIDE 2 MG: 1 INJECTION, SOLUTION INTRAMUSCULAR; INTRAVENOUS at 08:13

## 2025-05-26 RX ADMIN — PROPOFOL 120 MG: 10 INJECTION, EMULSION INTRAVENOUS at 08:16

## 2025-05-26 RX ADMIN — ROCURONIUM 10 MG: 50 INJECTION, SOLUTION INTRAVENOUS at 09:05

## 2025-05-26 RX ADMIN — ACETAMINOPHEN 975 MG: 325 TABLET ORAL at 14:25

## 2025-05-26 RX ADMIN — PIPERACILLIN AND TAZOBACTAM 3.38 G: 3; .375 INJECTION, POWDER, FOR SOLUTION INTRAVENOUS at 12:48

## 2025-05-26 RX ADMIN — PIPERACILLIN AND TAZOBACTAM 3.38 G: 3; .375 INJECTION, POWDER, FOR SOLUTION INTRAVENOUS at 01:06

## 2025-05-26 RX ADMIN — KETOROLAC TROMETHAMINE 15 MG: 15 INJECTION, SOLUTION INTRAMUSCULAR; INTRAVENOUS at 18:11

## 2025-05-26 RX ADMIN — PHENYLEPHRINE HYDROCHLORIDE 100 MCG: 10 INJECTION INTRAVENOUS at 08:27

## 2025-05-26 RX ADMIN — PROPOFOL 50 MG: 10 INJECTION, EMULSION INTRAVENOUS at 08:38

## 2025-05-26 RX ADMIN — SODIUM CHLORIDE, SODIUM LACTATE, POTASSIUM CHLORIDE, AND CALCIUM CHLORIDE: .6; .31; .03; .02 INJECTION, SOLUTION INTRAVENOUS at 10:13

## 2025-05-26 RX ADMIN — ONDANSETRON 4 MG: 2 INJECTION INTRAMUSCULAR; INTRAVENOUS at 09:27

## 2025-05-26 RX ADMIN — SODIUM CHLORIDE, SODIUM LACTATE, POTASSIUM CHLORIDE, AND CALCIUM CHLORIDE: .6; .31; .03; .02 INJECTION, SOLUTION INTRAVENOUS at 08:12

## 2025-05-26 RX ADMIN — FENTANYL CITRATE 100 MCG: 50 INJECTION, SOLUTION INTRAMUSCULAR; INTRAVENOUS at 08:15

## 2025-05-26 RX ADMIN — FENTANYL CITRATE 25 MCG: 50 INJECTION, SOLUTION INTRAMUSCULAR; INTRAVENOUS at 10:11

## 2025-05-26 RX ADMIN — ROCURONIUM 40 MG: 50 INJECTION, SOLUTION INTRAVENOUS at 08:17

## 2025-05-26 RX ADMIN — PIPERACILLIN AND TAZOBACTAM 3.38 G: 3; .375 INJECTION, POWDER, FOR SOLUTION INTRAVENOUS at 06:04

## 2025-05-26 RX ADMIN — POTASSIUM CHLORIDE, DEXTROSE MONOHYDRATE AND SODIUM CHLORIDE: 150; 5; 450 INJECTION, SOLUTION INTRAVENOUS at 11:53

## 2025-05-26 RX ADMIN — PHENYLEPHRINE HYDROCHLORIDE 100 MCG: 10 INJECTION INTRAVENOUS at 08:25

## 2025-05-26 RX ADMIN — SENNOSIDES AND DOCUSATE SODIUM 1 TABLET: 50; 8.6 TABLET ORAL at 11:30

## 2025-05-26 RX ADMIN — SODIUM CHLORIDE, SODIUM LACTATE, POTASSIUM CHLORIDE, AND CALCIUM CHLORIDE: .6; .31; .03; .02 INJECTION, SOLUTION INTRAVENOUS at 07:08

## 2025-05-26 ASSESSMENT — ACTIVITIES OF DAILY LIVING (ADL)
ADLS_ACUITY_SCORE: 28
ADLS_ACUITY_SCORE: 28
ADLS_ACUITY_SCORE: 18
ADLS_ACUITY_SCORE: 27
ADLS_ACUITY_SCORE: 29
ADLS_ACUITY_SCORE: 29
ADLS_ACUITY_SCORE: 18
ADLS_ACUITY_SCORE: 29
ADLS_ACUITY_SCORE: 29
ADLS_ACUITY_SCORE: 28
ADLS_ACUITY_SCORE: 29
ADLS_ACUITY_SCORE: 28
ADLS_ACUITY_SCORE: 28
ADLS_ACUITY_SCORE: 27
ADLS_ACUITY_SCORE: 29
ADLS_ACUITY_SCORE: 28
ADLS_ACUITY_SCORE: 29
ADLS_ACUITY_SCORE: 28
ADLS_ACUITY_SCORE: 28
ADLS_ACUITY_SCORE: 29
ADLS_ACUITY_SCORE: 18
ADLS_ACUITY_SCORE: 29
ADLS_ACUITY_SCORE: 29

## 2025-05-26 ASSESSMENT — LIFESTYLE VARIABLES: TOBACCO_USE: 0

## 2025-05-26 NOTE — PROGRESS NOTES
Brief GI Note    Patient admitted with acute cholecystitis and underwent lap cholecystectomy today. Tbili had increased this morning 1.4->1.9. MRCP showed stone in cystic duct, distal CBD mildly dilated without choledocholithiasis. Dr. Juarez spoke to Dr. Gaines who will monitor liver chemistries tomorrow and reach out to us if bilirubin has not improved.    We will sign off. Please call questions.     Velvet Lau DNP  [facility]  [phone]

## 2025-05-26 NOTE — PLAN OF CARE
"Plan of Care Reviewed With: patient, spouse    Problem: Adult Inpatient Plan of Care  Goal: Plan of Care Review  Description: The Plan of Care Review/Shift note should be completed every shift.  The Outcome Evaluation is a brief statement about your assessment that the patient is improving, declining, or no change.  This information will be displayed automatically on your shift  note.  Outcome: Progressing  Flowsheets (Taken 5/25/2025 2206)  Plan of Care Reviewed With:   patient   spouse  Goal: Patient-Specific Goal (Individualized)  Description: You can add care plan individualizations to a care plan. Examples of Individualization might be:  \"Parent requests to be called daily at 9am for status\", \"I have a hard time hearing out of my right ear\", or \"Do not touch me to wake me up as it startles  me\".  Outcome: Progressing  Goal: Absence of Hospital-Acquired Illness or Injury  Outcome: Progressing  Goal: Optimal Comfort and Wellbeing  Outcome: Progressing  Goal: Readiness for Transition of Care  Outcome: Progressing  Flowsheets (Taken 5/25/2025 2200)  Transportation Anticipated: family or friend will provide  Intervention: Mutually Develop Transition Plan  Recent Flowsheet Documentation  Taken 5/25/2025 2200 by Kaelyn Bautista, RN  Transportation Anticipated: family or friend will provide  Patient/Family Anticipated Services at Transition: none  Patient/Family Anticipates Transition to: home with family  Taken 5/25/2025 2100 by Kaelyn Bautista, RN  Equipment Currently Used at Home: none     Problem: Fall Injury Risk  Goal: Absence of Fall and Fall-Related Injury  Outcome: Progressing     Problem: Pain Acute  Goal: Optimal Pain Control and Function  Outcome: Progressing     Goal Outcome Evaluation: Patient arrive from ED 2200, CO of dizziness, A/O, SBA, voiding freely w/o difficulty, no BM for shift. VSS on RA, IVF infusing, IV abx given. NPO at MN for procedure. Denies N/V, CO pain in ABD 9/10 sharp IV dilaudid " given, see MAR, pain Lvl improved. Dual skin assessment completed. Bed alarm on for safety, spouse at bedside. Continue to monitor.     Kaelyn Bautista RN

## 2025-05-26 NOTE — PLAN OF CARE
Problem: Adult Inpatient Plan of Care  Goal: Readiness for Transition of Care  Outcome: Progressing     Problem: Fall Injury Risk  Goal: Absence of Fall and Fall-Related Injury  Outcome: Progressing     Problem: Pain Acute  Goal: Optimal Pain Control and Function  Outcome: Progressing     Problem: Surgery Nonspecified  Goal: Absence of Infection Signs and Symptoms  Outcome: Progressing   Goal Outcome Evaluation:       POD zero of cholecystectomy, tolerating pain with present regimen, voiding freely with adequate output, up and ambulating in the room independently, abdominal incision sites clean,dry and intact, continues on IV antibiotics, afebrile VSS.    Jeffrey Hamilton RN

## 2025-05-26 NOTE — ANESTHESIA CARE TRANSFER NOTE
Patient: Nataliya Ayala    Procedure: Procedure(s):  CHOLECYSTECTOMY, LAPAROSCOPIC       Diagnosis: Acute cholecystitis [K81.0]  Diagnosis Additional Information: No value filed.    Anesthesia Type:   General     Note:    Oropharynx: oropharynx clear of all foreign objects  Level of Consciousness: awake  Oxygen Supplementation: face mask  Level of Supplemental Oxygen (L/min / FiO2): 5  Independent Airway: airway patency satisfactory and stable  Dentition: dentition unchanged  Vital Signs Stable: post-procedure vital signs reviewed and stable  Report to RN Given: handoff report given  Patient transferred to: PACU    Handoff Report: Identifed the Patient, Identified the Reponsible Provider, Reviewed the pertinent medical history, Discussed the surgical course, Reviewed Intra-OP anesthesia mangement and issues during anesthesia, Set expectations for post-procedure period and Allowed opportunity for questions and acknowledgement of understanding      Vitals:  Vitals Value Taken Time   /70    Temp     Pulse 78    Resp 14    SpO2 100        Electronically Signed By: KO Boyce CRNA  May 26, 2025  9:40 AM

## 2025-05-26 NOTE — ANESTHESIA PROCEDURE NOTES
Airway       Patient location during procedure: OR       Procedure Start/Stop Times: 5/26/2025 8:18 AM  Staff -        CRNA: Rachel Nielson APRN CRNA       Performed By: CRNA  Consent for Airway        Urgency: elective  Indications and Patient Condition       Indications for airway management: claudia-procedural       Induction type:intravenous       Mask difficulty assessment: 2 - vent by mask + OA or adjuvant +/- NMBA    Final Airway Details       Final airway type: endotracheal airway       Successful airway: ETT - single  Endotracheal Airway Details        ETT size (mm): 7.0       Cuffed: yes       Successful intubation technique: direct laryngoscopy       DL Blade Type: Mina 2       Grade View of Cords: 1       Adjucts: stylet       Position: Left       Measured from: lips       Secured at (cm): 22       Bite block used: None    Post intubation assessment        Placement verified by: capnometry and chest rise        Number of attempts at approach: 1       Number of other approaches attempted: 0       Secured with: tape       Ease of procedure: easy       Dentition: Intact and Unchanged       Dental guard used and removed. Dental Guard Type: Standard White.    Medication(s) Administered   Medication Administration Time: 5/26/2025 8:18 AM

## 2025-05-26 NOTE — PLAN OF CARE
Dual Skin Assessment Note:    Patient admitted from ED from to P2.     Comprehensive skin inspection completed by myself and Crystal Anderson RN.     Abnormal skin assessment findings: L elbow scar, dry bilateral heels    LDA Initiated for skin breakdown/non-blanchable redness: No    Provider notified: No    If yes, WOC Consult order obtained: No    Kaelyn Bautista RN 10:32 PM

## 2025-05-26 NOTE — PROGRESS NOTES
[facility]    Medicine Progress Note - Hospitalist Service    Date of Admission:  5/25/2025    Assessment & Plan       Nataliya Ayala is a 50 year old female with no significant past medical history presented to the hospital with complaint of right upper quadrant abdominal pain, ultrasound of the abdomen showed acute cholecystitis and possible choledocholithiasis, ED provider spoke to surgery who recommended cholecystectomy and possible intraoperative cholangiogram in the morning, patient received Zosyn, Dilaudid and Zofran in the ED and is going to be admitted for acute cholecystitis and possible choledocholithiasis.      5/26 :     S/p CHOLECYSTECTOMY, LAPAROSCOPIC today  Post op day 0, continue post op care  Continue iv zosyn, iv fluids, pain control, liquid diet  Surgery team following        Likely discharge in am if ok with surgery team        A/p :      Problem list and plan:    Acute cholecystitis  Possible choledocholithiasis  Mild hyperbilirubinemia    Patient presented to the hospital with complaint of right upper quadrant abdominal pain  Patient was evaluated yesterday at [facility], has had right upper quadrant abdominal pain since Thursday but denies any nausea and vomiting  Had a full workup at [facility] and ultrasound of the abdomen showed cholecystitis and dilation of the common bile duct  GI did not recommend ERCP at that time and wanted patient to be evaluated for possible cholecystectomy however the patient declined and was discharged on oral antibiotics  Now presenting with right upper quadrant abdominal pain again, as per patient has not started taking the antibiotics  Labs significant for mild hyperbilirubinemia  ED provider spoke to surgery who recommended cholecystectomy and possible intraoperative cholangiogram in the morning  Patient received Zosyn, Dilaudid and Zofran in the ED Continue with pain management as per protocol  Continue with antiemetics as  appropriate  Continue with broad-spectrum antibiotics  Follow-up GI and surgery for further recommendations  Will keep n.p.o. for now, bowel rest, serial abdominal examination  Trend LFTs  Ordered MRCP : 1.  Acute cholecystitis.  2.  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  3.  Distal common bile duct is mildly dilated for age, without evidence of choledocholithiasis. Findings could be reactive or secon, follow-up results         Urinary tract infection  Urine appears to be infected, urine cultures pending  Currently on Zosyn which hopefully will cover both the intra-abdominal cholecystitis and UTI     Mild hyperglycemia most likely reactive  Follow-up hemoglobin A1c : 4/9  Monitor blood sugar level intermittently               Diet: Advance Diet as Tolerated: Full Liquid Diet; Regular Diet Adult    DVT Prophylaxis: Pneumatic Compression Devices  Nugent Catheter: Not present  Lines: None     Cardiac Monitoring: None  Code Status: Full Code      Clinically Significant Risk Factors Present on Admission                                        Social Drivers of Health            Disposition Plan     Medically Ready for Discharge: Anticipated Tomorrow             Bravo Jara MD  Hospitalist Service  [facility]  Securely message with [employer] (more info)  Text page via [facility] Paging/Directory   ______________________________________________________________________      Physical Exam   Vital Signs: Temp: 98.7  F (37.1  C) Temp src: Oral BP: 111/59 Pulse: 60   Resp: 16 SpO2: 95 % O2 Device: None (Room air) Oxygen Delivery: 2 LPM  Weight: 119 lbs 7.83 oz       GENERAL: The patient is not in any acute distressed. Awake and alert.  HEENT: Nonicteric sclerae, PERRLA, EOMI. Oropharynx clear. Moist mucous membranes. Conjunctivae appear well perfused.  HEART: Regular rate and  rhythm without murmurs.  LUNGS: Clear to auscultation bilaterally. No wheezing or crackles.  ABDOMEN: Soft, positive bowel sounds, nontender.  SKIN: No rash, no excessive bruising, petechiae, or purpura.  EXTREMITIES : no rashes, no swelling in legs.  NEUROLOGIC: conscious and oriented, follows commands, no obvious focal deficits.  ROS: All other systems negative       Medical Decision Making       60 MINUTES SPENT BY ME on the date of service doing chart review, history, exam, documentation & further activities per the note.  MANAGEMENT DISCUSSED with the following over the past 24 hours: rn, patient       Data     I have personally reviewed the following data over the past 24 hrs:    10.4  \   13.6   / 177     141 106 10.9 /  100 (H)   4.0 26 0.64 \     ALT: 13 AST: 12 AP: 68 TBILI: 1.9 (H)   ALB: 4.0 TOT PROTEIN: 6.9 LIPASE: 42     TSH: N/A T4: N/A A1C: 4.9

## 2025-05-26 NOTE — ED NOTES
"[facility] ED Handoff Report    ED Chief Complaint: Abdominal Pain    ED Diagnosis:  (R10.11) RUQ abdominal pain  Comment: at [facility] yesterday, US done  Plan: Cholesystitis, surger    (K81.0) Acute cholecystitis  Comment: CHOLECYSTECTOMY, LAPAROSCOPIC   Plan: CHOLECYSTECTOMY, LAPAROSCOPIC  tomorrow       PMH:    Past Medical History:   Diagnosis Date    Hearing problem         Code Status:  Full Code     Falls Risk: No Band: NOT Applied    Current Living Situation/Residence: lives with a significant other     Elimination Status: Continent: Yes     Activity Level: Independent    Patients Preferred Language:  English     Needed: No    Vital Signs:  /59   Pulse 57   Temp 98.8  F (37.1  C) (Oral)   Resp 18   Ht 1.499 m (4' 11\")   Wt 54.3 kg (119 lb 9.6 oz)   LMP 10/01/2021   SpO2 98%   BMI 24.16 kg/m       Cardiac Rhythm: SR    Pain Score: 5/10    Is the Patient Confused:  No    Last Food or Drink: 05/25/25 at 1900     Focused Assessment:  Patient with acute RUQ pain since Thursday. Endorses mild nausea today, 4mg zofran helped. SR, lungs clear bilateral, +2 pulses, PERRLA, AO4, independent. CHOLECYSTECTOMY, LAPAROSCOPIC  tomorrow, NPO at midnight. Denies vomiting, diarrhea. Appears in no distress, well appearing individual.     Tests Performed: Done: Labs and Imaging    Treatments Provided:  0.5mg Dilaudid,  4mg Zofran, 3.375mg Zosyn    Family Dynamics/Concerns: No    Family Updated On Visitor Policy: Yes    Plan of Care Communicated to Family: Yes    Who Was Updated about Plan of Care: Severina - spouse    Belongings Checklist Done and Signed by Patient: Yes    Medications sent with patient:     Covid: asymptomatic , NOT tested    Additional Information:     RN: Sharif Lee RN   5/25/2025 7:12 PM       "

## 2025-05-26 NOTE — OP NOTE
Operative Note:  Laparoscopic Cholecystectomy    Name:  Nataliya Ayala  PCP:  [facility]  Procedure Date:  5/25/2025 - 5/26/2025      Procedure(s):  CHOLECYSTECTOMY, LAPAROSCOPIC     Pre-Procedure Diagnosis:  Cholecystitis    Post-Procedure Diagnosis:    Cholecystits    Surgeon(s):  Edilberto Gaines MD      Anesthesia Type:  GET      Findings:  A tensely distended gallbladder with a stone in the cystic duct.  Hydrops gallbladder with fluid in the gallbladder that appeared infected.    Operative Report:    The patient was taken to Operating Room, identified, and the procedure verified as Laparoscopic Cholecystectomy  A Time Out was held.    General endotracheal anesthesia was administered and tolerated well. After the induction, the abdomen was prepped in the usual sterile fashion. The patient was positioned in the supine position. They were sterilely prepped and draped in the usual surgical fashion.    Local anesthetic agent was injected into the skin near the umbilicus and an incision made. A periumbilical incision was made and a 5mm trocar was placed under direct visualization using the optiview technique, and a pneumoperitoneum was brought up to 15 mm Hg. . 2 5's and a  11 mm port was placed under direct vision.  All skin incisions were infiltrated with a local anesthetic agent before making the incision and placing the trocars.     The gallbladder was identified, the fundus grasped and retracted cephalad up over the inferior edge of the liver. The infundibulum was grasped and retracted laterally, exposing the neck of the gallbladder.  The visceral peritoneum overlying the angle of Calot was dissected through with hook cautery. The cystic duct was clearly identified and bluntly dissected circumferentially. The junctions of the gallbladder and the cystic duct was clearly identified.  The cystic duct was clipped with one clip on the gallbladder side and 3 clips more proximally on the  cystic duct. The Cystic duct was divided sharply with laparoscopic scissors between the clips. The cystic artery was identified, it was dissected free of surrounding structures.  The cystic artery was clipped and divided.      The gallbladder was dissected from the liver bed in retrograde fashion with the electrocautery. The gallbladder was removed by placing it in a endocatch bag and extracting it from through the 11 mm trocar site.   The fascia of the 10 mm trocar site was then closed with 0 vicryl using an Endo fascial closure device under direct physician laparoscope     Pneumoperitoneum was reduced.  The trocars were removed.  The skin was then closed with 4-0 monocryl and a sterile dressing was applied.    Instrument, sponge, and needle counts were correct at closure and at the conclusion of the case.    Estimated Blood Loss:   10 cc    Specimens:    Gall Bladder       Drains:   None    Complications:    None    Edilberto Gaines MD     Date: 5/26/2025  Time: 9:59 AM

## 2025-05-26 NOTE — ANESTHESIA PREPROCEDURE EVALUATION
Anesthesia Pre-Procedure Evaluation    Patient: Nataliya Ayala   MRN: [id number] : 1975          Procedure : Procedure(s):  CHOLECYSTECTOMY, LAPAROSCOPIC         Past Medical History:   Diagnosis Date    Hearing problem       Past Surgical History:   Procedure Laterality Date     SECTION N/A 2017    Procedure: REPEAT  SECTION  ;  Surgeon: Kimberly Reyes MD;  Location: [facility] Main OR;  Service:     DECOMPRESSION CUBITAL TUNNEL Left 2018    Procedure: DECOMPRESSION CUBITAL TUNNEL;  Left Cubital Tunnel Release, Possible Ulnar Nerve Transposition, Left Carpal Tunnel Release;  Surgeon: Marcos Saucedo MD;  Location: [facility] OR    RELEASE CARPAL TUNNEL Right 2018    Procedure: RELEASE CARPAL TUNNEL;;  Surgeon: Marcos Saucedo MD;  Location: [facility] OR    RELEASE CARPAL TUNNEL Left 2018    Procedure: RELEASE CARPAL TUNNEL;;  Surgeon: Marcos Saucedo MD;  Location: [facility] OR    RELEASE ULNAR NERVE (ELBOW) Right 2018    Procedure: RELEASE ULNAR NERVE (ELBOW);;  Surgeon: Marcos Saucedo MD;  Location: [facility] OR    ZZC  DELIVERY ONLY      Description:  Section;  Recorded: 2012;  Comments: x 2      Allergies   Allergen Reactions    Blood-Group Specific Substance      Anti Lesa antibody present      Social History     Tobacco Use    Smoking status: Never    Smokeless tobacco: Never   Substance Use Topics    Alcohol use: No      Wt Readings from Last 1 Encounters:   25 52.9 kg (116 lb 11.2 oz)        Anesthesia Evaluation   Pt has had prior anesthetic. Type: MAC, Regional and General.    History of anesthetic complications  - PONV.      ROS/MED HX  ENT/Pulmonary:  - neg pulmonary ROS  (-) tobacco use   Neurologic:  - neg neurologic ROS     Cardiovascular:  - neg cardiovascular ROS     METS/Exercise Tolerance:     Hematologic:       Musculoskeletal:       GI/Hepatic:    (-) GERD   Renal/Genitourinary:       Endo:      "  Psychiatric/Substance Use:       Infectious Disease:       Malignancy:       Other:              Physical Exam  Airway  Mallampati: II  TM distance: >3 FB  Neck ROM: full    Cardiovascular   Rhythm: regular     Dental   (+) Minor Abnormalities - some fillings, tiny chips      Pulmonary Breath sounds clear to auscultation        Neurological   She appears awake and alert.    Other Findings       OUTSIDE LABS:  CBC:   Lab Results   Component Value Date    WBC 10.5 05/25/2025    WBC 7.1 12/01/2021    HGB 14.5 05/25/2025    HGB 11.8 12/01/2021    HCT 43.6 05/25/2025    HCT 38.2 12/01/2021     05/25/2025     12/01/2021     BMP:   Lab Results   Component Value Date     05/25/2025     12/01/2021    POTASSIUM 4.1 05/25/2025    POTASSIUM 4.0 12/01/2021    CHLORIDE 103 05/25/2025    CHLORIDE 105 12/01/2021    CO2 25 05/25/2025    CO2 24 12/01/2021    BUN 11.3 05/25/2025    BUN 9 12/01/2021    CR 0.59 05/25/2025    CR 0.58 (L) 12/01/2021     (H) 05/25/2025    GLC 93 12/01/2021     COAGS: No results found for: \"PTT\", \"INR\", \"FIBR\"  POC:   Lab Results   Component Value Date    HCG Negative 05/25/2025    HCGS Negative 12/01/2021     HEPATIC:   Lab Results   Component Value Date    ALBUMIN 4.2 05/25/2025    PROTTOTAL 7.2 05/25/2025    ALT 15 05/25/2025    AST 22 05/25/2025    ALKPHOS 71 05/25/2025    BILITOTAL 1.4 (H) 05/25/2025     OTHER:   Lab Results   Component Value Date    A1C 4.9 05/25/2025    DAYANNA 9.3 05/25/2025    PHOS 2.9 05/25/2025    MAG 2.0 05/25/2025    LIPASE 42 05/25/2025    TSH 0.68 06/01/2018       Anesthesia Plan    ASA Status:  2       Anesthesia Type: General.  Airway: oral.  Induction: intravenous.  Maintenance: TIVA.   Techniques and Equipment:       - Monitoring Plan: standard ASA monitoring     Consents    Anesthesia Plan(s) and associated risks, benefits, and realistic alternatives discussed. Questions answered and patient/representative(s) expressed understanding.     - " Discussed:     - Discussed with:  Patient        - Pt is DNR/DNI Status: no DNR     Blood Consent:      - Discussed with: patient.     - Consented: consented to blood products     Postoperative Care    Pain management: non-narcotic analgesics, plan for postoperative opioid use.     Comments:                   Niama Pagan MD    I have reviewed the pertinent notes and labs in the chart from the past 30 days and (re)examined the patient.  Any updates or changes from those notes are reflected in this note.    Clinically Significant Risk Factors Present on Admission

## 2025-05-26 NOTE — ED NOTES
No A/Bs noted in between feeds but suck apnea noted with every feeding whether using a slow flow or regular nipple.    Report given to Arden GUEVARA RN

## 2025-05-26 NOTE — CONSULTS
General surgery consult         ASSESSMENT     1. RUQ abdominal pain    2. Acute cholecystitis             PLAN      Lap Nancy         CHIEF COMPLAINT     Chief Complaint   Patient presents with    Abdominal Pain         HPI     Nataliya Ayala is a 50 year old female who presents with RUQ abdominal pain.  US shows signs of gall stones and gall bladder inflammation.            PAST MEDICAL HISTORY SURGICAL HISTORY     Past Medical History:   Diagnosis Date    Hearing problem     Past Surgical History:   Procedure Laterality Date     SECTION N/A 2017    Procedure: REPEAT  SECTION  ;  Surgeon: Kimberly Reyes MD;  Location: [facility] OR;  Service:     DECOMPRESSION CUBITAL TUNNEL Left 2018    Procedure: DECOMPRESSION CUBITAL TUNNEL;  Left Cubital Tunnel Release, Possible Ulnar Nerve Transposition, Left Carpal Tunnel Release;  Surgeon: Marcos Saucedo MD;  [facility]    RELEASE CARPAL TUNNEL Right 2018    Procedure: RELEASE CARPAL TUNNEL;;  Surgeon: Marcos Saucedo MD;  [facility]    RELEASE CARPAL TUNNEL Left 2018    Procedure: RELEASE CARPAL TUNNEL;;  Surgeon: Marcos Saucedo MD;  [facility]: [facility] OR    RELEASE ULNAR NERVE (ELBOW) Right 2018    Procedure: RELEASE ULNAR NERVE (ELBOW);;  Surgeon: Marcos Saucedo MD;  Location: [facility] OR    ZZC  DELIVERY ONLY      Description:  Section;  Recorded: 2012;  Comments: x 2          CURRENT MEDICATIONS ALLERGIES     No current outpatient medications on file.    Allergies   Allergen Reactions    Blood-Group Specific Substance      Anti [place] antibody present          FAMILY HISTORY     Family History   Problem Relation Age of Onset    Cerebrovascular Disease Mother     Cancer Sister 50.00        ovarian         SOCIAL HISTORY     Social History     Socioeconomic History    Marital status:      Spouse name: None    Number of children: None    Years of education: None     "Highest education level: None   Tobacco Use    Smoking status: Never    Smokeless tobacco: Never   Substance and Sexual Activity    Alcohol use: No    Drug use: No    Sexual activity: Yes     Partners: Male     Birth control/protection: Condom, None     Social Drivers of Health     Financial Resource Strain: Low Risk  (5/25/2025)    Financial Resource Strain     Within the past 12 months, have you or your family members you live with been unable to get utilities (heat, electricity) when it was really needed?: No   Food Insecurity: Low Risk  (5/25/2025)    Food Insecurity     Within the past 12 months, did you worry that your food would run out before you got money to buy more?: No     Within the past 12 months, did the food you bought just not last and you didn t have money to get more?: No   Transportation Needs: Low Risk  (5/25/2025)    Transportation Needs     Within the past 12 months, has lack of transportation kept you from medical appointments, getting your medicines, non-medical meetings or appointments, work, or from getting things that you need?: No   Interpersonal Safety: Low Risk  (5/25/2025)    Interpersonal Safety     Do you feel physically and emotionally safe where you currently live?: Yes     Within the past 12 months, have you been hit, slapped, kicked or otherwise physically hurt by someone?: No     Within the past 12 months, have you been humiliated or emotionally abused in other ways by your partner or ex-partner?: No   Housing Stability: Low Risk  (5/25/2025)    Housing Stability     Do you have housing? : Yes     Are you worried about losing your housing?: No         REVIEW OF SYSTEMS       12 point review of systems are unremarkable except for the symptoms described in the HPI    PHYSICAL EXAM     VITAL SIGNS: /65   Pulse 62   Temp 98.3  F (36.8  C) (Oral)   Resp 20   Ht 1.499 m (4' 11\")   Wt 52.9 kg (116 lb 11.2 oz)   LMP 10/01/2021   SpO2 98%   BMI 23.57 kg/m     General : " Alert, cooperative, appears stated age   Skin: Skin color, texture, turgor normal, no rashes or lesions   Lymph nodes: No obvious adenopathy   Head: Normocephalic, without obvious abnormality, atraumatic   HEENT:  conjunctiva/corneas clear, EOM's intact, no scleral icterus   Throat: Lips, mucosa, and tongue normal; teeth and gums normal    Neck: Supple, thyroid not enlarged   Back: No CVA tenderness   Lungs: Clear to auscultation bilaterally, respirations unlabored, no rales, rhonchi or wheezes   Chest Wall:No tenderness or deformity   Heart: Regular rate and rhythm, S1, S2 normal, no murmur, rub or gallop   Abdomen: Soft, non-tender, no guarding, + bowel sounds active,   Extremities : No obvious swelling,  Neurologic: Cranial Nerves II-XII normal, moves all extremities equally, no focal findings          RADIOLOGY      MR Abdomen MRCP w/o & w Contrast   Final Result   IMPRESSION:   1.  Acute cholecystitis.   2.  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   3.  Distal common bile duct is mildly dilated for age, without evidence of choledocholithiasis. Findings could be reactive or secondary to recent stone passage.          EKG     Reviewed        LABS     Results for orders placed or performed during the hospital encounter of 05/25/25   MR Abdomen MRCP w/o & w Contrast    Impression    IMPRESSION:  1.  Acute cholecystitis.  2.  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  3.  Distal common bile duct is mildly dilated for age, without evidence of choledocholithiasis. Findings could be reactive or secondary to recent stone passage.   UA with Microscopic reflex to Culture    Specimen: Urine, Midstream   Result Value Ref Range    Color Urine Light Yellow Colorless,  Straw, Light Yellow, Yellow    Appearance Urine Turbid (A) Clear    Glucose Urine Negative Negative mg/dL    Bilirubin Urine Negative Negative    Ketones Urine Negative Negative mg/dL    Specific Gravity Urine 1.013 1.001 - 1.030    Blood Urine Negative Negative    pH Urine 7.5 (H) 5.0 - 7.0    Protein Albumin Urine Negative Negative mg/dL    Urobilinogen Urine Normal Normal mg/dL    Nitrite Urine Negative Negative    Leukocyte Esterase Urine 250 Mónica/uL (A) Negative    Amorphous Crystals Urine Few (A) None Seen /HPF    RBC Urine 2 <=2 /HPF    WBC Urine 12 (H) <=5 /HPF    Squamous Epithelials Urine <1 <=1 /HPF   HCG qualitative urine   Result Value Ref Range    hCG Urine Qualitative Negative Negative   Basic metabolic panel   Result Value Ref Range    Sodium 138 135 - 145 mmol/L    Potassium 4.1 3.4 - 5.3 mmol/L    Chloride 103 98 - 107 mmol/L    Carbon Dioxide (CO2) 25 22 - 29 mmol/L    Anion Gap 10 7 - 15 mmol/L    Urea Nitrogen 11.3 6.0 - 20.0 mg/dL    Creatinine 0.59 0.51 - 0.95 mg/dL    GFR Estimate >90 >60 mL/min/1.73m2    Calcium 9.3 8.8 - 10.4 mg/dL    Glucose 103 (H) 70 - 99 mg/dL   Hepatic function panel   Result Value Ref Range    Protein Total 7.2 6.4 - 8.3 g/dL    Albumin 4.2 3.5 - 5.2 g/dL    Bilirubin Total 1.4 (H) <=1.2 mg/dL    Alkaline Phosphatase 71 40 - 150 U/L    AST 22 0 - 45 U/L    ALT 15 0 - 50 U/L    Bilirubin Direct 0.24 0.00 - 0.30 mg/dL   Result Value Ref Range    Lipase 42 13 - 60 U/L   CBC with platelets and differential   Result Value Ref Range    WBC Count 10.5 4.0 - 11.0 10e3/uL    RBC Count 4.73 3.80 - 5.20 10e6/uL    Hemoglobin 14.5 11.7 - 15.7 g/dL    Hematocrit 43.6 35.0 - 47.0 %    MCV 92 78 - 100 fL    MCH 30.7 26.5 - 33.0 pg    MCHC 33.3 31.5 - 36.5 g/dL    RDW 12.1 10.0 - 15.0 %    Platelet Count 176 150 - 450 10e3/uL    % Neutrophils 75 %    % Lymphocytes 16 %    % Monocytes 8 %    % Eosinophils 1 %    % Basophils 1 %    % Immature Granulocytes 0 %    NRBCs per 100 WBC 0 <1  /100    Absolute Neutrophils 7.8 1.6 - 8.3 10e3/uL    Absolute Lymphocytes 1.7 0.8 - 5.3 10e3/uL    Absolute Monocytes 0.8 0.0 - 1.3 10e3/uL    Absolute Eosinophils 0.1 0.0 - 0.7 10e3/uL    Absolute Basophils 0.1 0.0 - 0.2 10e3/uL    Absolute Immature Granulocytes 0.0 <=0.4 10e3/uL    Absolute NRBCs 0.0 10e3/uL   Extra Blue Top Tube   Result Value Ref Range    Hold Specimen JIC    Extra Red Top Tube   Result Value Ref Range    Hold Specimen JIC    Hemoglobin A1c   Result Value Ref Range    Estimated Average Glucose 94 <117 mg/dL    Hemoglobin A1C 4.9 <5.7 %   Result Value Ref Range    Magnesium 2.0 1.7 - 2.3 mg/dL   Result Value Ref Range    Phosphorus 2.9 2.5 - 4.5 mg/dL           [facility]  [phone]    Yes

## 2025-05-26 NOTE — ANESTHESIA POSTPROCEDURE EVALUATION
Patient: Nataliya Ayala    Procedure: Procedure(s):  CHOLECYSTECTOMY, LAPAROSCOPIC       Anesthesia Type:  General    Note:  Disposition: Inpatient   Postop Pain Control: Uneventful            Sign Out: Well controlled pain   PONV: No   Neuro/Psych: Uneventful            Sign Out: Acceptable/Baseline neuro status   Airway/Respiratory: Uneventful            Sign Out: Acceptable/Baseline resp. status   CV/Hemodynamics: Uneventful            Sign Out: Acceptable CV status; No obvious hypovolemia; No obvious fluid overload   Other NRE: NONE   DID A NON-ROUTINE EVENT OCCUR? No         Last vitals:  Vitals Value Taken Time   /53 05/26/25 10:00   Temp 45.1  C (113.18  F) 05/26/25 09:56   Pulse 70 05/26/25 10:11   Resp 13 05/26/25 10:11   SpO2 98 % 05/26/25 10:11   Vitals shown include unfiled device data.    Electronically Signed By: Naima Pagan MD  May 26, 2025  10:12 AM

## 2025-05-27 LAB
ALBUMIN SERPL BCG-MCNC: 3.4 G/DL (ref 3.5–5.2)
ALP SERPL-CCNC: 53 U/L (ref 40–150)
ALT SERPL W P-5'-P-CCNC: 33 U/L (ref 0–50)
AMYLASE SERPL-CCNC: 14 U/L (ref 28–100)
ANION GAP SERPL CALCULATED.3IONS-SCNC: 6 MMOL/L (ref 7–15)
AST SERPL W P-5'-P-CCNC: 25 U/L (ref 0–45)
BACTERIA UR CULT: NO GROWTH
BILIRUB SERPL-MCNC: 1.6 MG/DL
BUN SERPL-MCNC: 10.6 MG/DL (ref 6–20)
CALCIUM SERPL-MCNC: 8.7 MG/DL (ref 8.8–10.4)
CHLORIDE SERPL-SCNC: 104 MMOL/L (ref 98–107)
CREAT SERPL-MCNC: 0.54 MG/DL (ref 0.51–0.95)
EGFRCR SERPLBLD CKD-EPI 2021: >90 ML/MIN/1.73M2
ERYTHROCYTE [DISTWIDTH] IN BLOOD BY AUTOMATED COUNT: 12.1 % (ref 10–15)
GLUCOSE BLDC GLUCOMTR-MCNC: 91 MG/DL (ref 70–99)
GLUCOSE SERPL-MCNC: 90 MG/DL (ref 70–99)
HCO3 SERPL-SCNC: 27 MMOL/L (ref 22–29)
HCT VFR BLD AUTO: 33.9 % (ref 35–47)
HGB BLD-MCNC: 11.4 G/DL (ref 11.7–15.7)
MCH RBC QN AUTO: 31 PG (ref 26.5–33)
MCHC RBC AUTO-ENTMCNC: 33.6 G/DL (ref 31.5–36.5)
MCV RBC AUTO: 92 FL (ref 78–100)
PLATELET # BLD AUTO: 145 10E3/UL (ref 150–450)
POTASSIUM SERPL-SCNC: 3.9 MMOL/L (ref 3.4–5.3)
PROT SERPL-MCNC: 5.9 G/DL (ref 6.4–8.3)
RBC # BLD AUTO: 3.68 10E6/UL (ref 3.8–5.2)
SODIUM SERPL-SCNC: 137 MMOL/L (ref 135–145)
WBC # BLD AUTO: 8 10E3/UL (ref 4–11)

## 2025-05-27 PROCEDURE — 99232 SBSQ HOSP IP/OBS MODERATE 35: CPT | Performed by: INTERNAL MEDICINE

## 2025-05-27 PROCEDURE — 82310 ASSAY OF CALCIUM: CPT | Performed by: SURGERY

## 2025-05-27 PROCEDURE — 99232 SBSQ HOSP IP/OBS MODERATE 35: CPT | Mod: 24

## 2025-05-27 PROCEDURE — 250N000013 HC RX MED GY IP 250 OP 250 PS 637

## 2025-05-27 PROCEDURE — 250N000011 HC RX IP 250 OP 636: Mod: JZ | Performed by: INTERNAL MEDICINE

## 2025-05-27 PROCEDURE — 120N000001 HC R&B MED SURG/OB

## 2025-05-27 PROCEDURE — 36415 COLL VENOUS BLD VENIPUNCTURE: CPT | Performed by: SURGERY

## 2025-05-27 PROCEDURE — 250N000011 HC RX IP 250 OP 636: Mod: JZ | Performed by: SURGERY

## 2025-05-27 PROCEDURE — 250N000013 HC RX MED GY IP 250 OP 250 PS 637: Performed by: SURGERY

## 2025-05-27 PROCEDURE — 250N000011 HC RX IP 250 OP 636: Performed by: SURGERY

## 2025-05-27 PROCEDURE — 85041 AUTOMATED RBC COUNT: CPT | Performed by: SURGERY

## 2025-05-27 PROCEDURE — 250N000013 HC RX MED GY IP 250 OP 250 PS 637: Performed by: INTERNAL MEDICINE

## 2025-05-27 PROCEDURE — 82150 ASSAY OF AMYLASE: CPT | Performed by: SURGERY

## 2025-05-27 RX ORDER — OXYCODONE HYDROCHLORIDE 5 MG/1
5 TABLET ORAL EVERY 4 HOURS PRN
Refills: 0 | Status: DISCONTINUED | OUTPATIENT
Start: 2025-05-27 | End: 2025-05-27

## 2025-05-27 RX ORDER — KETOROLAC TROMETHAMINE 15 MG/ML
15 INJECTION, SOLUTION INTRAMUSCULAR; INTRAVENOUS EVERY 6 HOURS
Status: COMPLETED | OUTPATIENT
Start: 2025-05-27 | End: 2025-05-27

## 2025-05-27 RX ORDER — IBUPROFEN 200 MG
400-600 TABLET ORAL EVERY 6 HOURS PRN
COMMUNITY
Start: 2025-05-27

## 2025-05-27 RX ORDER — TRAMADOL HYDROCHLORIDE 50 MG/1
50 TABLET ORAL EVERY 6 HOURS PRN
Status: DISCONTINUED | OUTPATIENT
Start: 2025-05-27 | End: 2025-05-28 | Stop reason: HOSPADM

## 2025-05-27 RX ORDER — POLYETHYLENE GLYCOL 3350 17 G/17G
1 POWDER, FOR SOLUTION ORAL DAILY PRN
COMMUNITY
Start: 2025-05-27

## 2025-05-27 RX ORDER — AMOXICILLIN 250 MG
1 CAPSULE ORAL 2 TIMES DAILY PRN
COMMUNITY
Start: 2025-05-27

## 2025-05-27 RX ORDER — ACETAMINOPHEN 325 MG/1
650-975 TABLET ORAL EVERY 6 HOURS PRN
COMMUNITY
Start: 2025-05-27

## 2025-05-27 RX ADMIN — ACETAMINOPHEN 975 MG: 325 TABLET ORAL at 05:35

## 2025-05-27 RX ADMIN — TRAMADOL HYDROCHLORIDE 50 MG: 50 TABLET, COATED ORAL at 11:50

## 2025-05-27 RX ADMIN — POLYETHYLENE GLYCOL 3350 17 G: 17 POWDER, FOR SOLUTION ORAL at 08:32

## 2025-05-27 RX ADMIN — PIPERACILLIN AND TAZOBACTAM 3.38 G: 3; .375 INJECTION, POWDER, FOR SOLUTION INTRAVENOUS at 00:01

## 2025-05-27 RX ADMIN — SENNOSIDES AND DOCUSATE SODIUM 1 TABLET: 50; 8.6 TABLET ORAL at 08:32

## 2025-05-27 RX ADMIN — KETOROLAC TROMETHAMINE 15 MG: 15 INJECTION, SOLUTION INTRAMUSCULAR; INTRAVENOUS at 11:50

## 2025-05-27 RX ADMIN — PIPERACILLIN AND TAZOBACTAM 3.38 G: 3; .375 INJECTION, POWDER, FOR SOLUTION INTRAVENOUS at 06:40

## 2025-05-27 RX ADMIN — SENNOSIDES AND DOCUSATE SODIUM 1 TABLET: 50; 8.6 TABLET ORAL at 22:27

## 2025-05-27 RX ADMIN — ACETAMINOPHEN 975 MG: 325 TABLET ORAL at 15:01

## 2025-05-27 RX ADMIN — KETOROLAC TROMETHAMINE 15 MG: 15 INJECTION, SOLUTION INTRAMUSCULAR; INTRAVENOUS at 18:18

## 2025-05-27 RX ADMIN — FAMOTIDINE 20 MG: 20 TABLET, FILM COATED ORAL at 08:32

## 2025-05-27 RX ADMIN — ACETAMINOPHEN 975 MG: 325 TABLET ORAL at 22:28

## 2025-05-27 RX ADMIN — KETOROLAC TROMETHAMINE 15 MG: 15 INJECTION, SOLUTION INTRAMUSCULAR; INTRAVENOUS at 05:34

## 2025-05-27 RX ADMIN — PIPERACILLIN AND TAZOBACTAM 3.38 G: 3; .375 INJECTION, POWDER, FOR SOLUTION INTRAVENOUS at 18:15

## 2025-05-27 RX ADMIN — PIPERACILLIN AND TAZOBACTAM 3.38 G: 3; .375 INJECTION, POWDER, FOR SOLUTION INTRAVENOUS at 12:00

## 2025-05-27 ASSESSMENT — ACTIVITIES OF DAILY LIVING (ADL)
ADLS_ACUITY_SCORE: 28

## 2025-05-27 NOTE — DISCHARGE INSTRUCTIONS
From your General Surgery Team:  You were seen by [facility] general surgery.  If you do not have an appointment and would like to schedule a follow up appointment with general surgery in clinic, please call us at [phone] to schedule an appointment at your convenience.     Follow up:  For straightforward laparoscopic procedures, our practice is to check-in with you over the phone a few days after your procedure.  If you would like a scheduled follow up appointment in clinic, please call us at [phone] to schedule an appointment at your convenience.  If you would prefer to follow up with us further by phone, please let us know so that we may contact you 2-3 weeks following your procedure.        Diet: Regular diet. Patients can have difficulty with constipation following surgery, due in part to the administration of narcotic pain medications.  If you are suffering with constipation, you should avoid foods such as hard cheeses or red meat.  Foods high in fiber are recommended.      Activity: You should continue to be active at home, including getting up and walking frequently.  If possible, limit the amount of time spent in bed.    Restrictions: You should not lift greater than 20 pounds for 2 weeks, and will want to avoid strenuous physical activity for 1-2 weeks.  You should limit your physical activity if it causes you discomfort; however, this should resolve within 1-2 weeks.   Walking does not count as strenuous physical activity and you are safe to walk up and down stairs.  Following 2 weeks you may resume normal physical activity.    Work:  You can return to work once your surgical pain has resolved.  If you perform duties that require lifting, pushing or pulling anything greater than 15 pounds, you should be on light duty for the immediate 2 weeks after your surgery (if your work allows light duty).  After 2 weeks, you can return to work without restrictions.    Wound care: Your incisions are  covered with sterile dressings that should be removed after 48 hours. If there is drainage pooling under the bandage, change dressing prior to 48 hours. After 48 hours, no further dressing needed.  It is normal to have a small rim of red present around the incisions. This should not, however, extend beyond 1/4 inch from the incision.  If your incisions become increasingly tender, red, or draining, please contact us.       Bathing: You may shower after 24 hours from surgery.  It is ok to get your incisions wet, but avoid rubbing them.  Avoid soaking in bath tubs or swimming in lakes, pools, or streams for 2 weeks following surgery.   PAIN:  Some pain is expected after surgery. This will improve over time. After laparoscopic surgery, some people experience shoulder pain on the first day after surgery. This is from the gas used to inflate the abdomen during the surgery. This sensation usually improves within 48 hours. I recommend using Tylenol 1000 mg every 6 hours and ibuprofen 400-600 mg every 6 hours for your primary pain control. Alternate between the two medications, taking one every three hours. Example schedule below.    9 AM  - Tylenol 1000 mg   12 PM - Ibuprofen 400-600 mg  3 PM - Tylenol 1000 mg   6 PM - Ibuprofen 400-600 mg    Take these medications scheduled for 3 days. Do not exceed the maximum dosage amount over 24 hours as recommended on the medication bottle.   Take the prescribed narcotic medications only if needed in addition to medications above.     Narcotic medications can cause constipation. If you are struggling with constipation, we recommend taking Miralax once daily or stool softener combo (such as Dulcolax or Senna) to help easily pass stool. Do not take these medications if you are having diarrhea or are sensitive / allergic to them.

## 2025-05-27 NOTE — PLAN OF CARE
Problem: Adult Inpatient Plan of Care  Goal: Absence of Hospital-Acquired Illness or Injury  Intervention: Identify and Manage Fall Risk  Recent Flowsheet Documentation  Taken 5/27/2025 0812 by Jeffrey Hamilton RN  Safety Promotion/Fall Prevention:   activity supervised   assistive device/personal items within reach   increased rounding and observation   nonskid shoes/slippers when out of bed   room near nurse's station  Intervention: Prevent Skin Injury  Recent Flowsheet Documentation  Taken 5/27/2025 0812 by Jeffrey Hamilton RN  Body Position: position changed independently  Skin Protection: adhesive use limited  Intervention: Prevent Infection  Recent Flowsheet Documentation  Taken 5/27/2025 0812 by Jeffrey Hamilton RN  Infection Prevention: rest/sleep promoted   Goal Outcome Evaluation:       Pain is tolerated with present regimen, increase activity encouraged, appetite is poor, patient making needs none appropriately, up and ambulating on the unit, BM x1, voiding freely with adequate output. Dressing to abdominal incision sites dry and intact, afebrile, VSS.    Jeffrey Hamilton RN

## 2025-05-27 NOTE — PROGRESS NOTES
[facility]    Medicine Progress Note - Hospitalist Service    Date of Admission:  5/25/2025    Assessment & Plan   Nataliya Ayala is a 50 year old female with no significant past medical history who presented to ED for evaluation of right upper quadrant abdominal pain.  Patient found to have acute cholecystitis with possible choledocholithiasis.  Patient admitted for further management.       Acute calculus cholecystitis;  MRCP reported distal CBD mildly dilated for age, without evidence of choledocholithiasis  --Patient underwent laparoscopic cholecystectomy.  --Advance diet per surgery team.  If appetite remains poor then will consider IV fluid.  --On IV Zosyn.    Acute postoperative pain;  -- Ongoing issues with pain on her right upper quadrant, on scheduled IV Toradol, on oral Tylenol.  Reported having nausea with IV Dilaudid and discontinued.  Surgery team added as needed tramadol.    Abnormal UA, UCx no growth    Mild anemia and thrombocytopenia, likely hemodilution.  Patient not on Lovenox or heparin.          Diet: Regular Diet Adult    DVT Prophylaxis: Pneumatic Compression Devices and Ambulate every shift  Nugent Catheter: Not present  Lines: None     Cardiac Monitoring: None  Code Status: Full Code      Clinically Significant Risk Factors               # Hypoalbuminemia: Lowest albumin = 3.4 g/dL at 5/27/2025  6:33 AM, will monitor as appropriate                           Social Drivers of Health            Disposition Plan     Medically Ready for Discharge: Anticipated Tomorrow, pain control, making sure patient can tolerate oral diet             Margarito FIGUEROA MD  Hospitalist Service  [facility]  Securely message with [employer] (more info)  Text page via [employer] Paging/Directory   ______________________________________________________________________    Interval History   Patient is new to me.  Patient seen and examined.  Notes, labs, imaging report personally  reviewed.  Patient reported ongoing pain on right upper quadrant, reported feeling nauseated, has not ate much.  Denied feeling fever or chills.  Denied short of breath.    Patient reported ambulating frequently.  Discussed with nursing staff.    Physical Exam   Vital Signs: Temp: 98.6  F (37  C) Temp src: Oral BP: 97/48 Pulse: 70   Resp: 16 SpO2: 97 % O2 Device: None (Room air) Oxygen Delivery: 2 LPM  Weight: 119 lbs 7.83 oz      General: Not in obvious distress.  HEENT: Normocephalic, supple neck  Chest: Clear to auscultation bilateral anteriorly, no wheezing  Heart: S1S2 normal, regular  Abdomen: Soft.  Mild distention, tenderness on right upper quadrant, also at laparoscopic incision sites  Neuro: alert and awake, follows simple commands appropriately, moves all extremity        Medical Decision Making             Data     I have personally reviewed the following data over the past 24 hrs:    8.0  \   11.4 (L)   / 145 (L)     137 104 10.6 /  90   3.9 27 0.54 \     ALT: 33 AST: 25 AP: 53 TBILI: 1.6 (H)   ALB: 3.4 (L) TOT PROTEIN: 5.9 (L) LIPASE: N/A       Imaging results reviewed over the past 24 hrs:   No results found for this or any previous visit (from the past 24 hours).

## 2025-05-27 NOTE — PROGRESS NOTES
General Surgery Progress Note:    Hospital Day # 2    ASSESSMENT:  1. RUQ abdominal pain    2. Acute cholecystitis        Nataliya Ayala is a 50 year old female who is s/p laparoscopic cholecystectomy on 5/26/25 with hydrops gallbladder. Postoperatively patient is progressing slowly, has not eaten much this morning and is sore, nausea with opioid medications. LFTs at baseline and bilirubin downtrending, potentially at patients baseline from previous labs. Plan to trial tramadol instead of oxy, when tolerating diet patient is cleared for discharge from surgical standpoint.    PLAN:  - Diet as tolerated  - Activity as tolerated, careful lifting over 20 pounds for about 2 weeks  - Multimodal pain management with Tylenol, ibuprofen, ice.  Changed opioid as patient was getting nauseated to trial of tramadol  -Okay to discharge from surgical standpoint once tolerating diet  -Discharge recommendations involve instructions placed in AVS.  -Follow up as needed    SUBJECTIVE:   Nataliya Ayala was seen on rounds. Is feeling okay but sore along her right side and incisions on the upper abdomen. Nauseated with oxycodone. Has had this with opioids in the past. Tolerating pain but worse with movement. No fever or chills. Tolerating liquids, hasn't tried much food    VITALS RANGE:  Temp:  [97.8  F (36.6  C)-98.7  F (37.1  C)] 98.6  F (37  C)  Pulse:  [55-70] 70  Resp:  [16-23] 16  BP: ()/(46-59) 97/48  SpO2:  [95 %-99 %] 97 %    PHYSICAL EXAM:  General: patient seen resting in bed in no acute distress  Resp: no increased work of breathing, breathing comfortably on room air  Abdomen: generally soft with tenderness with palpation over the RUQ and near incisions, right side near ribs. No guarding. Incisions CDI with dressings in place  Extremities: No edema or cyanosis visualized on exam, no obvious deformities    05/26 0700 - 05/27 0659  In: 933 [P.O.:330; I.V.:603]  Out: 960 [Urine:950]    Admission on 05/25/2025    Component Date Value    Color Urine 05/25/2025 Light Yellow     Appearance Urine 05/25/2025 Turbid (A)     Glucose Urine 05/25/2025 Negative     Bilirubin Urine 05/25/2025 Negative     Ketones Urine 05/25/2025 Negative     Specific Gravity Urine 05/25/2025 1.013     Blood Urine 05/25/2025 Negative     pH Urine 05/25/2025 7.5 (H)     Protein Albumin Urine 05/25/2025 Negative     Urobilinogen Urine 05/25/2025 Normal     Nitrite Urine 05/25/2025 Negative     Leukocyte Esterase Urine 05/25/2025 250 Mónica/uL (A)     Amorphous Crystals Urine 05/25/2025 Few (A)     RBC Urine 05/25/2025 2     WBC Urine 05/25/2025 12 (H)     Squamous Epithelials Uri* 05/25/2025 <1     hCG Urine Qualitative 05/25/2025 Negative     Sodium 05/25/2025 138     Potassium 05/25/2025 4.1     Chloride 05/25/2025 103     Carbon Dioxide (CO2) 05/25/2025 25     Anion Gap 05/25/2025 10     Urea Nitrogen 05/25/2025 11.3     Creatinine 05/25/2025 0.59     GFR Estimate 05/25/2025 >90     Calcium 05/25/2025 9.3     Glucose 05/25/2025 103 (H)     Protein Total 05/25/2025 7.2     Albumin 05/25/2025 4.2     Bilirubin Total 05/25/2025 1.4 (H)     Alkaline Phosphatase 05/25/2025 71     AST 05/25/2025 22     ALT 05/25/2025 15     Bilirubin Direct 05/25/2025 0.24     Lipase 05/25/2025 42     Culture 05/25/2025 No Growth     WBC Count 05/25/2025 10.5     RBC Count 05/25/2025 4.73     Hemoglobin 05/25/2025 14.5     Hematocrit 05/25/2025 43.6     MCV 05/25/2025 92     MCH 05/25/2025 30.7     MCHC 05/25/2025 33.3     RDW 05/25/2025 12.1     Platelet Count 05/25/2025 176     % Neutrophils 05/25/2025 75     % Lymphocytes 05/25/2025 16     % Monocytes 05/25/2025 8     % Eosinophils 05/25/2025 1     % Basophils 05/25/2025 1     % Immature Granulocytes 05/25/2025 0     NRBCs per 100 WBC 05/25/2025 0     Absolute Neutrophils 05/25/2025 7.8     Absolute Lymphocytes 05/25/2025 1.7     Absolute Monocytes 05/25/2025 0.8     Absolute Eosinophils 05/25/2025 0.1     Absolute  Basophils 05/25/2025 0.1     Absolute Immature Granul* 05/25/2025 0.0     Absolute NRBCs 05/25/2025 0.0     Hold Specimen 05/25/2025 [facility]     Hold Specimen 05/25/2025 [facility]     Estimated Average Glucose 05/25/2025 94     Hemoglobin A1C 05/25/2025 4.9     Magnesium 05/25/2025 2.0     Phosphorus 05/25/2025 2.9     Sodium 05/26/2025 141     Potassium 05/26/2025 4.0     Carbon Dioxide (CO2) 05/26/2025 26     Anion Gap 05/26/2025 9     Urea Nitrogen 05/26/2025 10.9     Creatinine 05/26/2025 0.64     GFR Estimate 05/26/2025 >90     Calcium 05/26/2025 8.9     Chloride 05/26/2025 106     Glucose 05/26/2025 100 (H)     Alkaline Phosphatase 05/26/2025 68     AST 05/26/2025 12     ALT 05/26/2025 13     Protein Total 05/26/2025 6.9     Albumin 05/26/2025 4.0     Bilirubin Total 05/26/2025 1.9 (H)     WBC Count 05/26/2025 10.4     RBC Count 05/26/2025 4.51     Hemoglobin 05/26/2025 13.6     Hematocrit 05/26/2025 41.8     MCV 05/26/2025 93     MCH 05/26/2025 30.2     MCHC 05/26/2025 32.5     RDW 05/26/2025 12.3     Platelet Count 05/26/2025 177     Phosphorus 05/26/2025 3.3     Magnesium 05/26/2025 2.0     Bilirubin Direct 05/26/2025 0.40 (H)     Sodium 05/27/2025 137     Potassium 05/27/2025 3.9     Carbon Dioxide (CO2) 05/27/2025 27     Anion Gap 05/27/2025 6 (L)     Urea Nitrogen 05/27/2025 10.6     Creatinine 05/27/2025 0.54     GFR Estimate 05/27/2025 >90     Calcium 05/27/2025 8.7 (L)     Chloride 05/27/2025 104     Glucose 05/27/2025 90     Alkaline Phosphatase 05/27/2025 53     AST 05/27/2025 25     ALT 05/27/2025 33     Protein Total 05/27/2025 5.9 (L)     Albumin 05/27/2025 3.4 (L)     Bilirubin Total 05/27/2025 1.6 (H)     Amylase 05/27/2025 14 (L)     WBC Count 05/27/2025 8.0     RBC Count 05/27/2025 3.68 (L)     Hemoglobin 05/27/2025 11.4 (L)     Hematocrit 05/27/2025 33.9 (L)     MCV 05/27/2025 92     MCH 05/27/2025 31.0     MCHC 05/27/2025 33.6     RDW 05/27/2025 12.1     Platelet Count 05/27/2025 145 (L)     GLUCOSE  BY METER POCT 05/27/2025 91         FRANCESCA [facility] Surgery  [address] [phone]

## 2025-05-27 NOTE — PLAN OF CARE
Problem: Surgery Nonspecified  Goal: Optimal Pain Control and Function  Outcome: Progressing     Problem: Surgery Nonspecified  Goal: Absence of Infection Signs and Symptoms  Outcome: Progressing     Problem: Surgery Nonspecified  Goal: Absence of Bleeding  Intervention: Monitor and Manage Bleeding  Recent Flowsheet Documentation  Taken 5/26/2025 2100 by Kaelyn Bautista, RN  Bleeding Management: dressing monitored     Problem: Surgery Nonspecified  Goal: Blood Glucose Level Within Targeted Range  Outcome: Progressing     Goal Outcome Evaluation: Patient POD 1, lap cholecystectomy, IVF discontinued, IV abx given, CO of full/cramp feeling, applied ABD binder, X4 lap sites with scant serosang drainage, applied ice pack , gave PRN dilaudid and scheduled Tylenol, and Toradol for pain mgt. encouraged oob, ambulated in the payne x2, tolerated well, pt stated fullness feeling improved, passing gas, voiding w/o difficulty.      ISS initial instruction provided. Spouse visited, patient denies nausea, advanced to regular diet.. 0600 BG 91. Continue to monitor.     Possible discharge 5/27, Surgery team following     Kaelyn Bautista, RN

## 2025-05-27 NOTE — PROGRESS NOTES
"SPIRITUAL HEALTH SERVICES NOTE  [facility]; P2    Reason for Visit: admission screening response    SPIRITUAL ASSESSMENT  Identifies pain as her primary concern today  Welcomes prayer    Patient/Family Understanding of Illness and Goals of Care - Spoke with Nataliya while she was holding an ice pack on her abdomen. She says that she had her gallbladder removed somewhat unexpectedly, noting \"I was at mass on Sunday morning and I was in so much pain.\" She believes that surgery went smoothly and is trying to manage her pain during this visit, noting \"I just got some medication.\" Nataliya anticipates discharging to home tomorrow.     Distress and Loss - Not addressed during this visit    Strengths, Coping, and Resources - Nataliya states that she has support at home.     Meaning, Beliefs, and Spirituality - Nataliya is Religion. She does not feel the need to notify her Zoroastrianism at this time, but welcomes a prayer, stating \"Prayer is one of the things that I think helps the most.\"     Plan of Care: No plans for follow-up at this time due to patient's anticipated discharge today/tomorrow.  available by patient or staff request.     Torri Mina M.Div.    Office: [phone] (for non-urgent requests)  Please Vocera or page through [facility] for time-sensitive requests    "

## 2025-05-28 VITALS
WEIGHT: 119.49 LBS | DIASTOLIC BLOOD PRESSURE: 52 MMHG | RESPIRATION RATE: 16 BRPM | OXYGEN SATURATION: 99 % | HEART RATE: 61 BPM | SYSTOLIC BLOOD PRESSURE: 106 MMHG | HEIGHT: 59 IN | BODY MASS INDEX: 24.09 KG/M2 | TEMPERATURE: 98.2 F

## 2025-05-28 LAB
GLUCOSE BLDC GLUCOMTR-MCNC: 70 MG/DL (ref 70–99)
PATH REPORT.COMMENTS IMP SPEC: NORMAL
PATH REPORT.COMMENTS IMP SPEC: NORMAL
PATH REPORT.FINAL DX SPEC: NORMAL
PATH REPORT.GROSS SPEC: NORMAL
PATH REPORT.MICROSCOPIC SPEC OTHER STN: NORMAL
PATH REPORT.RELEVANT HX SPEC: NORMAL
PHOTO IMAGE: NORMAL

## 2025-05-28 PROCEDURE — 250N000013 HC RX MED GY IP 250 OP 250 PS 637: Performed by: SURGERY

## 2025-05-28 PROCEDURE — 250N000011 HC RX IP 250 OP 636: Performed by: SURGERY

## 2025-05-28 PROCEDURE — 250N000013 HC RX MED GY IP 250 OP 250 PS 637

## 2025-05-28 PROCEDURE — 250N000013 HC RX MED GY IP 250 OP 250 PS 637: Performed by: INTERNAL MEDICINE

## 2025-05-28 PROCEDURE — 99239 HOSP IP/OBS DSCHRG MGMT >30: CPT | Performed by: INTERNAL MEDICINE

## 2025-05-28 RX ORDER — PANTOPRAZOLE SODIUM 40 MG/1
40 TABLET, DELAYED RELEASE ORAL DAILY
Qty: 10 TABLET | Refills: 0 | Status: SHIPPED | OUTPATIENT
Start: 2025-05-28 | End: 2025-06-07

## 2025-05-28 RX ORDER — TRAMADOL HYDROCHLORIDE 50 MG/1
50 TABLET ORAL EVERY 6 HOURS PRN
Qty: 12 TABLET | Refills: 0 | Status: SHIPPED | OUTPATIENT
Start: 2025-05-28

## 2025-05-28 RX ADMIN — PIPERACILLIN AND TAZOBACTAM 3.38 G: 3; .375 INJECTION, POWDER, FOR SOLUTION INTRAVENOUS at 06:23

## 2025-05-28 RX ADMIN — TRAMADOL HYDROCHLORIDE 50 MG: 50 TABLET, COATED ORAL at 05:35

## 2025-05-28 RX ADMIN — SENNOSIDES AND DOCUSATE SODIUM 1 TABLET: 50; 8.6 TABLET ORAL at 08:56

## 2025-05-28 RX ADMIN — ACETAMINOPHEN 975 MG: 325 TABLET ORAL at 05:31

## 2025-05-28 RX ADMIN — POLYETHYLENE GLYCOL 3350 17 G: 17 POWDER, FOR SOLUTION ORAL at 08:57

## 2025-05-28 RX ADMIN — FAMOTIDINE 20 MG: 20 TABLET, FILM COATED ORAL at 08:56

## 2025-05-28 RX ADMIN — PIPERACILLIN AND TAZOBACTAM 3.38 G: 3; .375 INJECTION, POWDER, FOR SOLUTION INTRAVENOUS at 01:07

## 2025-05-28 ASSESSMENT — ACTIVITIES OF DAILY LIVING (ADL)
ADLS_ACUITY_SCORE: 28

## 2025-05-28 NOTE — PLAN OF CARE
Problem: Adult Inpatient Plan of Care  Goal: Readiness for Transition of Care  5/28/2025 0354 by Kaelyn Bautista RN  Outcome: Progressing  5/28/2025 0354 by Kaelyn Bautista RN  Outcome: Progressing     Problem: Adult Inpatient Plan of Care  Goal: Absence of Hospital-Acquired Illness or Injury  Intervention: Prevent Infection  Recent Flowsheet Documentation  Taken 5/28/2025 0100 by Kaelyn Bautista RN  Infection Prevention: rest/sleep promoted     Problem: Pain Acute  Goal: Optimal Pain Control and Function  5/28/2025 0354 by Kaelyn Bautista RN  Outcome: Progressing  5/28/2025 0354 by Kaelyn Bautista RN  Outcome: Progressing  Intervention: Prevent or Manage Pain  Recent Flowsheet Documentation  Taken 5/28/2025 0100 by aKelyn Bautista RN  Sensory Stimulation Regulation: care clustered  Sleep/Rest Enhancement: awakenings minimized  Bowel Elimination Promotion: ambulation promoted  Medication Review/Management: medications reviewed     Problem: Surgery Nonspecified  Goal: Absence of Bleeding  5/28/2025 0354 by Kaelyn Bautista RN  Outcome: Progressing  5/28/2025 0354 by Kaelyn Bautista RN  Outcome: Progressing  Intervention: Monitor and Manage Bleeding  Recent Flowsheet Documentation  Taken 5/28/2025 0100 by Kaelyn Bautista RN  Bleeding Management: dressing monitored     Problem: Surgery Nonspecified  Goal: Blood Glucose Level Within Targeted Range  5/28/2025 0354 by Kaelyn Bautista RN  Outcome: Progressing  5/28/2025 0354 by Kaelyn Bautista RN  Outcome: Progressing     Goal Outcome Evaluation: Patient ind, ambulating in the room and payne, 8/10 pain, PRN TraMADOL given, ice pack applied, IV abx given, voiding freely, denies N/V tolerating reg diet. Incision sites serosanguineous scant dried drainage. 0600 BG 70  No concerns at this time.        Kaelyn Bautista RN

## 2025-05-28 NOTE — PLAN OF CARE
Problem: Adult Inpatient Plan of Care  Goal: Absence of Hospital-Acquired Illness or Injury  Outcome: Progressing  Intervention: Identify and Manage Fall Risk  Recent Flowsheet Documentation  Taken 5/27/2025 1900 by Hayley Landa RN  Safety Promotion/Fall Prevention: safety round/check completed  Intervention: Prevent Skin Injury  Recent Flowsheet Documentation  Taken 5/27/2025 1900 by Hayley Landa RN  Body Position: position changed independently  Intervention: Prevent Infection  Recent Flowsheet Documentation  Taken 5/27/2025 1900 by Hayley Landa RN  Infection Prevention: rest/sleep promoted     Problem: Pain Acute  Goal: Optimal Pain Control and Function  Outcome: Progressing  Intervention: Develop Pain Management Plan  Recent Flowsheet Documentation  Taken 5/27/2025 1900 by Hayley Landa RN  Pain Management Interventions: rest  Intervention: Prevent or Manage Pain  Recent Flowsheet Documentation  Taken 5/27/2025 1900 by Hayley Landa RN  Medication Review/Management: medications reviewed     Problem: Surgery Nonspecified  Goal: Absence of Bleeding  Outcome: Progressing  Intervention: Monitor and Manage Bleeding  Recent Flowsheet Documentation  Taken 5/27/2025 1900 by Hayley Landa RN  Bleeding Management: dressing monitored       Goal Outcome Evaluation:    A&Ox4. Independent. Reports 5/10 RUQ pain. Pain managed with ice packs, abdominal binder, and scheduled tylenol. Denies nausea and shortness of breath. Abdomen tender. Voiding. No bowel movement this shift. Incision sites serosanguineous scant dried drainage.

## 2025-05-28 NOTE — PROGRESS NOTES
ASSESSMENT:  1. Acute cholecystitis    2. RUQ abdominal pain        Nataliya Ayala is a 50 year old female who is s/p laparoscopic cholecystectomy on 5/26/25.     PLAN:  Discharge instructions/recommendations previously placed  Home from a surgical perspective was deemed appropriate by medicine  Prescription placed for pain medication    SUBJECTIVE:   She is doing better today. Tolerating diet and having bowel function. Pain is controlled without nausea.      Patient Vitals for the past 24 hrs:   BP Temp Temp src Pulse Resp SpO2   05/28/25 0716 106/52 98.2  F (36.8  C) Oral 61 16 99 %   05/28/25 0531 -- 98.4  F (36.9  C) -- -- -- --   05/28/25 0027 107/54 98.5  F (36.9  C) Oral 60 16 96 %   05/27/25 1541 102/49 98.6  F (37  C) Oral 61 16 97 %         PHYSICAL EXAM:  GEN: No acute distress, comfortable  LUNGS: CTA bilaterally  CV:RRR  ABD:soft, not tender, not distended; incisions CDI  EXT:No cyanosis, edema or obvious abnormalities    05/27 0700 - 05/28 0659  In: 538 [P.O.:538]  Out: -     Admission on 05/25/2025   Component Date Value    Color Urine 05/25/2025 Light Yellow     Appearance Urine 05/25/2025 Turbid (A)     Glucose Urine 05/25/2025 Negative     Bilirubin Urine 05/25/2025 Negative     Ketones Urine 05/25/2025 Negative     Specific Gravity Urine 05/25/2025 1.013     Blood Urine 05/25/2025 Negative     pH Urine 05/25/2025 7.5 (H)     Protein Albumin Urine 05/25/2025 Negative     Urobilinogen Urine 05/25/2025 Normal     Nitrite Urine 05/25/2025 Negative     Leukocyte Esterase Urine 05/25/2025 250 Mónica/uL (A)     Amorphous Crystals Urine 05/25/2025 Few (A)     RBC Urine 05/25/2025 2     WBC Urine 05/25/2025 12 (H)     Squamous Epithelials Uri* 05/25/2025 <1     hCG Urine Qualitative 05/25/2025 Negative     Sodium 05/25/2025 138     Potassium 05/25/2025 4.1     Chloride 05/25/2025 103     Carbon Dioxide (CO2) 05/25/2025 25     Anion Gap 05/25/2025 10     Urea Nitrogen 05/25/2025 11.3     Creatinine  05/25/2025 0.59     GFR Estimate 05/25/2025 >90     Calcium 05/25/2025 9.3     Glucose 05/25/2025 103 (H)     Protein Total 05/25/2025 7.2     Albumin 05/25/2025 4.2     Bilirubin Total 05/25/2025 1.4 (H)     Alkaline Phosphatase 05/25/2025 71     AST 05/25/2025 22     ALT 05/25/2025 15     Bilirubin Direct 05/25/2025 0.24     Lipase 05/25/2025 42     Culture 05/25/2025 No Growth     WBC Count 05/25/2025 10.5     RBC Count 05/25/2025 4.73     Hemoglobin 05/25/2025 14.5     Hematocrit 05/25/2025 43.6     MCV 05/25/2025 92     MCH 05/25/2025 30.7     MCHC 05/25/2025 33.3     RDW 05/25/2025 12.1     Platelet Count 05/25/2025 176     % Neutrophils 05/25/2025 75     % Lymphocytes 05/25/2025 16     % Monocytes 05/25/2025 8     % Eosinophils 05/25/2025 1     % Basophils 05/25/2025 1     % Immature Granulocytes 05/25/2025 0     NRBCs per 100 WBC 05/25/2025 0     Absolute Neutrophils 05/25/2025 7.8     Absolute Lymphocytes 05/25/2025 1.7     Absolute Monocytes 05/25/2025 0.8     Absolute Eosinophils 05/25/2025 0.1     Absolute Basophils 05/25/2025 0.1     Absolute Immature Granul* 05/25/2025 0.0     Absolute NRBCs 05/25/2025 0.0     Hold Specimen 05/25/2025 [facility]     Hold Specimen 05/25/2025 [facility]     Estimated Average Glucose 05/25/2025 94     Hemoglobin A1C 05/25/2025 4.9     Magnesium 05/25/2025 2.0     Phosphorus 05/25/2025 2.9     Sodium 05/26/2025 141     Potassium 05/26/2025 4.0     Carbon Dioxide (CO2) 05/26/2025 26     Anion Gap 05/26/2025 9     Urea Nitrogen 05/26/2025 10.9     Creatinine 05/26/2025 0.64     GFR Estimate 05/26/2025 >90     Calcium 05/26/2025 8.9     Chloride 05/26/2025 106     Glucose 05/26/2025 100 (H)     Alkaline Phosphatase 05/26/2025 68     AST 05/26/2025 12     ALT 05/26/2025 13     Protein Total 05/26/2025 6.9     Albumin 05/26/2025 4.0     Bilirubin Total 05/26/2025 1.9 (H)     WBC Count 05/26/2025 10.4     RBC Count 05/26/2025 4.51     Hemoglobin 05/26/2025 13.6     Hematocrit 05/26/2025 41.8      MCV 05/26/2025 93     MCH 05/26/2025 30.2     MCHC 05/26/2025 32.5     RDW 05/26/2025 12.3     Platelet Count 05/26/2025 177     Phosphorus 05/26/2025 3.3     Magnesium 05/26/2025 2.0     Bilirubin Direct 05/26/2025 0.40 (H)     Sodium 05/27/2025 137     Potassium 05/27/2025 3.9     Carbon Dioxide (CO2) 05/27/2025 27     Anion Gap 05/27/2025 6 (L)     Urea Nitrogen 05/27/2025 10.6     Creatinine 05/27/2025 0.54     GFR Estimate 05/27/2025 >90     Calcium 05/27/2025 8.7 (L)     Chloride 05/27/2025 104     Glucose 05/27/2025 90     Alkaline Phosphatase 05/27/2025 53     AST 05/27/2025 25     ALT 05/27/2025 33     Protein Total 05/27/2025 5.9 (L)     Albumin 05/27/2025 3.4 (L)     Bilirubin Total 05/27/2025 1.6 (H)     Amylase 05/27/2025 14 (L)     WBC Count 05/27/2025 8.0     RBC Count 05/27/2025 3.68 (L)     Hemoglobin 05/27/2025 11.4 (L)     Hematocrit 05/27/2025 33.9 (L)     MCV 05/27/2025 92     MCH 05/27/2025 31.0     MCHC 05/27/2025 33.6     RDW 05/27/2025 12.1     Platelet Count 05/27/2025 145 (L)     GLUCOSE BY METER POCT 05/27/2025 91     GLUCOSE BY METER POCT 05/28/2025 70           Namita Sabillon, KO CNP

## 2025-05-28 NOTE — DISCHARGE SUMMARY
[facility] MEDICINE  DISCHARGE SUMMARY     [facility]  Admission Date: 5/25/2025   Discharge Provider: Margarito FIGUEROA MD Discharge Date: 5/28/2025   Diet:   Active Diet and Nourishment Order   Procedures    Regular Diet Adult    Diet       Code Status: Full Code   Activity: As recommended by surgery team        Condition at Discharge: Stable     REASON FOR PRESENTATION(See Admission Note for Details)   Right upper quadrant pain.    PRINCIPAL & ACTIVE DISCHARGE DIAGNOSES     Active Problems:    Acute cholecystitis    RUQ abdominal pain  Acute postoperative pain    PENDING LABS     Unresulted Labs Ordered in the Past 30 Days of this Admission       Date and Time Order Name Status Description    5/26/2025  9:13 AM Surgical Pathology Exam In process               PROCEDURES ( this hospitalization only)      Procedure(s):  CHOLECYSTECTOMY, LAPAROSCOPIC    RECOMMENDATIONS TO OUTPATIENT PROVIDER FOR F/U VISIT     Follow-up Appointments       Follow Up      It is our practice to have all patients follow up with us 2-3 weeks after their surgery to ensure they are recovering well.  For straightforward laparoscopic procedures, this can be done either in clinic as a scheduled follow up appointment, or over the phone.  If you would like a scheduled follow up appointment in clinic, please call us at [phone] to schedule an appointment at your convenience.  If you would prefer to follow up with us by phone please let us know so that we may contact you 2-3 weeks following your procedure.        Follow Up      Follow up with general surgery as recommended.  Please call [facility] at [phone] if you do not hear from them in next 2-3 business days.        Hospital Follow-up with Existing Primary Care Provider (PCP)          Schedule Primary Care visit within: 7 Days   Recommended labs and Imaging (to be ordered by Primary Care  Provider): CBC, CMP                   DISPOSITION     Home    SUMMARY OF HOSPITAL COURSE:      Nataliya Ayala is a 50 year old female with no significant past medical history who presented to ED for evaluation of right upper quadrant abdominal pain.  Patient found to have acute cholecystitis with possible choledocholithiasis.  Patient admitted for further management.         Acute calculus cholecystitis;  Acute postoperative pain;  MRCP reported distal CBD mildly dilated for age, without evidence of choledocholithiasis  --Patient underwent laparoscopic cholecystectomy.  Patient received antibiotics IV Zosyn during hospitalization.  Advance diet and now tolerating oral diet.  Pain management and activity level per surgery team.  Surgery team prescribed Tylenol and Motrin, added PPI for few days.    Abnormal UA, UCx no growth     Mild anemia and thrombocytopenia, likely hemodilution.  Patient not on Lovenox or heparin.  Recheck when follow-up with PCP in a week    Discharge Medications with Med changes:     Current Discharge Medication List        START taking these medications    Details   acetaminophen (TYLENOL) 325 MG tablet Take 2-3 tablets (650-975 mg) by mouth every 6 hours as needed for mild pain.    Associated Diagnoses: Acute cholecystitis      ibuprofen (ADVIL/MOTRIN) 200 MG tablet Take 2-3 tablets (400-600 mg) by mouth every 6 hours as needed for pain.    Associated Diagnoses: Acute cholecystitis      pantoprazole (PROTONIX) 40 MG EC tablet Take 1 tablet (40 mg) by mouth daily for 10 days.  Qty: 10 tablet, Refills: 0    Associated Diagnoses: Acute cholecystitis      polyethylene glycol (MIRALAX) 17 GM/Dose powder Take 17 g (1 Capful) by mouth daily as needed for constipation.    Associated Diagnoses: Acute cholecystitis      senna-docusate (SENOKOT-S/PERICOLACE) 8.6-50 MG tablet Take 1 tablet by mouth 2 times daily as needed for constipation.    Associated Diagnoses: Acute cholecystitis      traMADol  "(ULTRAM) 50 MG tablet Take 1 tablet (50 mg) by mouth every 6 hours as needed for severe pain.  Qty: 12 tablet, Refills: 0    Associated Diagnoses: Acute cholecystitis           STOP taking these medications       cefdinir (OMNICEF) 300 MG capsule Comments:   Reason for Stopping:         metroNIDAZOLE (FLAGYL) 500 MG tablet Comments:   Reason for Stopping:                     Rationale for medication changes:      Omnicef and Flagyl discontinued as those were prescribed for acute cholecystitis, possible underwent cholecystectomy and received antibiotics during hospitalization.        Consults       GASTROENTEROLOGY IP CONSULT  SURGERY GENERAL IP CONSULT    Immunizations given this encounter     Most Recent Immunizations   Administered Date(s) Administered    Flu, Unspecified 10/20/2016    TDAP (Adacel,Boostrix) 10/20/2016           Anticoagulation Information      Recent INR results: No results for input(s): \"INR\" in the last 168 hours.        SIGNIFICANT IMAGING FINDINGS     Results for orders placed or performed during the hospital encounter of 05/25/25   MR Abdomen MRCP w/o & w Contrast    Impression    IMPRESSION:  1.  Acute cholecystitis.  2.  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  3.  Distal common bile duct is mildly dilated for age, without evidence of choledocholithiasis. Findings could be reactive or secondary to recent stone passage.       SIGNIFICANT LABORATORY FINDINGS     Most Recent 3 CBC's:  Recent Labs   Lab Test 05/27/25  0633 05/26/25  0659 05/25/25  1732   WBC 8.0 10.4 10.5   HGB 11.4* 13.6 14.5   MCV 92 93 92   * 177 176     Most Recent 3 BMP's:  Recent Labs   Lab Test 05/28/25  0606 05/27/25  0633 05/27/25  0605 05/26/25  0659 05/25/25  1732   NA  --  137  --  141 138   POTASSIUM  --  3.9  --  4.0 4.1   CHLORIDE  --  104  --  106 103   CO2  --  27 -- 26 25 "   BUN  --  10.6  --  10.9 11.3   CR  --  0.54  --  0.64 0.59   ANIONGAP  --  6*  --  9 10   DAYANNA  --  8.7*  --  8.9 9.3   GLC 70 90 91 100* 103*     Most Recent 2 LFT's:  Recent Labs   Lab Test 05/27/25  0633 05/26/25  0659   AST 25 12   ALT 33 13   ALKPHOS 53 68   BILITOTAL 1.6* 1.9*       Discharge Orders        Reason for your hospital stay    Laparoscopic cholecystectomy for acute cholecystitis     Activity    You should continue to be active at home, including ambulating frequently.  If possible try to limit the amount of time spent in bed.  You should not lift greater than 20 pounds for 2 weeks, and will want to avoid strenuous physical activity for 1-2 weeks.  You should limit your physical activity if it causes you discomfort; however, this should resolve within 1-2 weeks.   Walking does not count as strenuous physical activity.  You are safe to walk up and down stairs.  Following 2 weeks you may resume all normal physical activity.     Follow Up    It is our practice to have all patients follow up with us 2-3 weeks after their surgery to ensure they are recovering well.  For straightforward laparoscopic procedures, this can be done either in clinic as a scheduled follow up appointment, or over the phone.  If you would like a scheduled follow up appointment in clinic, please call us at [phone] to schedule an appointment at your convenience.  If you would prefer to follow up with us by phone please let us know so that we may contact you 2-3 weeks following your procedure.     Wound care and dressings    Instructions to care for your wound at home: remove your dressings 48 hours after surgery and leave them open to air. Shower is ok, but do not submerse your incisions in bathtub, swimming pool, or lakes etc for 1-2 weeks     Discharge Instructions     Reason for your hospital stay    Patient admitted for acute cholecystitis, underwent laparoscopic cholecystectomy.     Activity    Your activity upon  discharge: As recommended by surgery team     Follow Up    Follow up with general surgery as recommended.  Please call [facility] at [phone] if you do not hear from them in next 2-3 business days.     Diet    Follow this diet upon discharge: Diet as tolerated     Hospital Follow-up with Existing Primary Care Provider (PCP)            Examination   Physical Exam   Temp:  [98.2  F (36.8  C)-98.6  F (37  C)] 98.2  F (36.8  C)  Pulse:  [60-61] 61  Resp:  [16] 16  BP: (102-107)/(49-54) 106/52  SpO2:  [96 %-99 %] 99 %  Wt Readings from Last 1 Encounters:   05/26/25 54.2 kg (119 lb 7.8 oz)     Patient seen and examined.  Notes, labs, imaging report personally reviewed.  Patient reported abdominal pain better than yesterday, no nausea or vomiting.  Patient feels comfortable going home.  Surgery cleared patient for discharge.  Discussed with nursing staffs.  Detailed plan of care after discharge discussed with patient.    General: Not in obvious distress.  HEENT: Normocephalic, supple  Chest: Clear to auscultation bilateral anteriorly, no wheezing  Heart: S1S2 normal, regular  Abdomen: Soft.  Mild appropriate tenderness at laparoscopic incision site  Neuro: alert and awake, follows simple commands appropriately, moves all extremity        Please see EMR for more detailed significant labs, imaging, consultant notes etc.      Margarito DOMINGUEZ MD, personally saw the patient today and spent greater than 30 minutes discharging this patient.    Margarito FIGUEROA MD  [facility]    CC:[facility]

## 2025-05-29 ENCOUNTER — TELEPHONE (OUTPATIENT)
Dept: SURGERY | Facility: CLINIC | Age: 50
End: 2025-05-29

## 2025-05-29 ENCOUNTER — PATIENT OUTREACH (OUTPATIENT)
Dept: CARE COORDINATION | Facility: CLINIC | Age: 50
End: 2025-05-29

## 2025-05-29 NOTE — PROGRESS NOTES
[facility]    Background: Transitional Care Management program identified per system criteria and reviewed by [facility] team for possible outreach.    Assessment: Upon chart review, CCR Team member will not proceed with patient outreach related to this episode of Transitional Care Management program due to reason below:    Patient has active communication with a nurse, provider or care team for reason of post-hospital follow up plan.  Outreach call by CCR team not indicated to minimize duplicative efforts.     Plan: Transitional Care Management episode addressed appropriately per reason noted above.          EARL Christiansen  [phone]  [facility]

## 2025-05-29 NOTE — TELEPHONE ENCOUNTER
[facility] Post-Op Phone Call                    Surgeon: Edilberto Gaines MD    Date of Surgery: 05/26/2025  Surgery: Laparoscopic Cholecystectomy  Discharge Date: 05/28/2025    Date/Time Called:   Date: 5/29/2025 Time: 9:06 AM   Attempt: First    RN called patient to complete post-op call. No answer and left message for patient to call clinic for any questions or concerns regarding recovery.    Windy ROMAN RN, BSN

## 2025-06-07 ENCOUNTER — HEALTH MAINTENANCE LETTER (OUTPATIENT)
Age: 50
End: 2025-06-07

## (undated) DEVICE — SLING ARM MED 79-99155

## (undated) DEVICE — VIAL DECANTER STERILE WHITE DYNJDEC06

## (undated) DEVICE — COVER CAMERA IN-LIGHT DISP LT-C02

## (undated) DEVICE — SU MONOCRYL 4-0 PS-2 18" UND Y496G

## (undated) DEVICE — PAD CHUX UNDERPAD 30X30"

## (undated) DEVICE — SU DERMABOND ADVANCED .7ML DNX12

## (undated) DEVICE — DRSG TELFA 3X4" 1050

## (undated) DEVICE — ANTIFOG SOLUTION SEE SHARP 150M TROCAR SWABS 30978 (COI)

## (undated) DEVICE — BRUSH SURGICAL SCRUB W/4% CHG SOL 25ML 371073

## (undated) DEVICE — PREP CHLORAPREP 26ML TINTED HI-LITE ORANGE 930815

## (undated) DEVICE — BASIN EMESIS STERILE  SSK9005A

## (undated) DEVICE — SU VICRYL 3-0 SH 27" UND J416H

## (undated) DEVICE — ENDO SHEARS RENEW LAP ENDOCUT SCISSOR TIP 16.5MM 3142

## (undated) DEVICE — Device

## (undated) DEVICE — PACK HAND CUSTOM ASC

## (undated) DEVICE — CLIP APPLIER ENDO ROTATING 10MM MED/LG ER320

## (undated) DEVICE — DRSG GAUZE 4X4" 3033

## (undated) DEVICE — SOL NACL 0.9% IRRIG 500ML BOTTLE 2F7123

## (undated) DEVICE — SOL RINGERS LACTATED 1000ML BAG 2B2324X

## (undated) DEVICE — TOURNIQUET SGL BLADDER 18"X4" RED 5921-218-135

## (undated) DEVICE — GLOVE PROTEXIS BLUE W/NEU-THERA 6.5  2D73EB65

## (undated) DEVICE — BLADE KNIFE SURG 11 371111

## (undated) DEVICE — ENDO POUCH UNIV RETRIEVAL SYSTEM INZII 10MM CD001

## (undated) DEVICE — GLOVE PROTEXIS POWDER FREE SMT 6.5  2D72PT65X

## (undated) DEVICE — DRAPE STERI TOWEL LG 1010

## (undated) DEVICE — ENDO TROCAR SLEEVE KII Z-THREADED 05X100MM CTS02

## (undated) DEVICE — ESU HOLSTER PLASTIC DISP E2400

## (undated) DEVICE — GOWN XXL 9575

## (undated) DEVICE — SUCTION STRYKERFLOW II 250-070-500

## (undated) DEVICE — SU ETHILON 5-0 PS-2 18" 1666H

## (undated) DEVICE — SU MONOCRYL+ 4-0 18IN PS2 UND MCP496G

## (undated) DEVICE — PREP CHLORAPREP 26ML TINTED ORANGE  260815

## (undated) DEVICE — DRSG STERI STRIP 1/2X4" R1547

## (undated) DEVICE — DRSG KERLIX FLUFFS X5

## (undated) DEVICE — GLOVE BIOGEL PI ULTRATOUCH G SZ 8.0 42180

## (undated) DEVICE — BNDG KLING 2" 2231

## (undated) DEVICE — SOL WATER IRRIG 1000ML BOTTLE 2F7114

## (undated) DEVICE — SUCTION MANIFOLD NEPTUNE 2 SYS 1 PORT 702-025-000

## (undated) DEVICE — DRSG TEGADERM 2 3/8X2 3/4" 1624W

## (undated) DEVICE — TUBING SMOKE EVAC PNEUMOCLEAR HIGH FLOW 0620050250

## (undated) DEVICE — ESU GROUND PAD ADULT REM W/15' CORD E7507DB

## (undated) DEVICE — CAST PADDING 4" STERILE 9044S

## (undated) DEVICE — LINEN ORTHO PACK 5446

## (undated) DEVICE — SU VICRYL+ 0 27 UR6 VLT VCP603H

## (undated) DEVICE — CUSTOM PACK LAP CHOLE SBA5BLCHEA

## (undated) DEVICE — CLIP LIGACLIP LG YELLOW LT400

## (undated) DEVICE — NDL 27GA 1.25" 305136

## (undated) DEVICE — ENDO TROCAR FIRST ENTRY KII FIOS Z-THRD 11X100MM CTF33

## (undated) DEVICE — ENDO TROCAR FIRST ENTRY KII FIOS Z-THRD 05X100MM CTF03

## (undated) RX ORDER — KETOROLAC TROMETHAMINE 30 MG/ML
INJECTION, SOLUTION INTRAMUSCULAR; INTRAVENOUS
Status: DISPENSED
Start: 2018-08-30

## (undated) RX ORDER — FENTANYL CITRATE 50 UG/ML
INJECTION, SOLUTION INTRAMUSCULAR; INTRAVENOUS
Status: DISPENSED
Start: 2018-08-30

## (undated) RX ORDER — ACETAMINOPHEN 325 MG/1
TABLET ORAL
Status: DISPENSED
Start: 2018-08-30

## (undated) RX ORDER — PROPOFOL 10 MG/ML
INJECTION, EMULSION INTRAVENOUS
Status: DISPENSED
Start: 2018-08-30

## (undated) RX ORDER — DEXAMETHASONE SODIUM PHOSPHATE 4 MG/ML
INJECTION, SOLUTION INTRA-ARTICULAR; INTRALESIONAL; INTRAMUSCULAR; INTRAVENOUS; SOFT TISSUE
Status: DISPENSED
Start: 2018-09-27

## (undated) RX ORDER — DEXAMETHASONE SODIUM PHOSPHATE 4 MG/ML
INJECTION, SOLUTION INTRA-ARTICULAR; INTRALESIONAL; INTRAMUSCULAR; INTRAVENOUS; SOFT TISSUE
Status: DISPENSED
Start: 2018-08-30

## (undated) RX ORDER — DEXAMETHASONE SODIUM PHOSPHATE 10 MG/ML
INJECTION, SOLUTION INTRAMUSCULAR; INTRAVENOUS
Status: DISPENSED
Start: 2025-05-26

## (undated) RX ORDER — LIDOCAINE HYDROCHLORIDE 10 MG/ML
INJECTION, SOLUTION EPIDURAL; INFILTRATION; INTRACAUDAL; PERINEURAL
Status: DISPENSED
Start: 2025-05-26

## (undated) RX ORDER — FENTANYL CITRATE 50 UG/ML
INJECTION, SOLUTION INTRAMUSCULAR; INTRAVENOUS
Status: DISPENSED
Start: 2018-09-27

## (undated) RX ORDER — FENTANYL CITRATE 50 UG/ML
INJECTION, SOLUTION INTRAMUSCULAR; INTRAVENOUS
Status: DISPENSED
Start: 2025-05-26

## (undated) RX ORDER — ONDANSETRON 2 MG/ML
INJECTION INTRAMUSCULAR; INTRAVENOUS
Status: DISPENSED
Start: 2018-08-30

## (undated) RX ORDER — BUPIVACAINE HCL/EPINEPHRINE 0.25-.0005
VIAL (ML) INJECTION
Status: DISPENSED
Start: 2025-05-26

## (undated) RX ORDER — ONDANSETRON 2 MG/ML
INJECTION INTRAMUSCULAR; INTRAVENOUS
Status: DISPENSED
Start: 2025-05-26

## (undated) RX ORDER — GABAPENTIN 300 MG/1
CAPSULE ORAL
Status: DISPENSED
Start: 2018-08-30

## (undated) RX ORDER — ONDANSETRON 2 MG/ML
INJECTION INTRAMUSCULAR; INTRAVENOUS
Status: DISPENSED
Start: 2018-09-27

## (undated) RX ORDER — CEFAZOLIN SODIUM 1 G/3ML
INJECTION, POWDER, FOR SOLUTION INTRAMUSCULAR; INTRAVENOUS
Status: DISPENSED
Start: 2025-05-26

## (undated) RX ORDER — CEFAZOLIN SODIUM 1 G/3ML
INJECTION, POWDER, FOR SOLUTION INTRAMUSCULAR; INTRAVENOUS
Status: DISPENSED
Start: 2018-08-30

## (undated) RX ORDER — PROPOFOL 10 MG/ML
INJECTION, EMULSION INTRAVENOUS
Status: DISPENSED
Start: 2025-05-26